# Patient Record
Sex: FEMALE | Race: WHITE | NOT HISPANIC OR LATINO | Employment: UNEMPLOYED | URBAN - METROPOLITAN AREA
[De-identification: names, ages, dates, MRNs, and addresses within clinical notes are randomized per-mention and may not be internally consistent; named-entity substitution may affect disease eponyms.]

---

## 2017-01-06 ENCOUNTER — RECORDS - HEALTHEAST (OUTPATIENT)
Dept: MAMMOGRAPHY | Facility: CLINIC | Age: 67
End: 2017-01-06

## 2017-01-06 DIAGNOSIS — Z12.31 ENCOUNTER FOR SCREENING MAMMOGRAM FOR MALIGNANT NEOPLASM OF BREAST: ICD-10-CM

## 2017-02-27 ENCOUNTER — RECORDS - HEALTHEAST (OUTPATIENT)
Dept: ADMINISTRATIVE | Facility: OTHER | Age: 67
End: 2017-02-27

## 2017-04-18 ENCOUNTER — COMMUNICATION - HEALTHEAST (OUTPATIENT)
Dept: INTERNAL MEDICINE | Facility: CLINIC | Age: 67
End: 2017-04-18

## 2017-04-20 ENCOUNTER — OFFICE VISIT - HEALTHEAST (OUTPATIENT)
Dept: INTERNAL MEDICINE | Facility: CLINIC | Age: 67
End: 2017-04-20

## 2017-04-20 DIAGNOSIS — K26.4 BLEEDING DUODENAL ULCER: ICD-10-CM

## 2017-04-20 DIAGNOSIS — L43.8 ORAL LICHEN PLANUS: ICD-10-CM

## 2017-04-20 DIAGNOSIS — K52.9 CHRONIC DIARRHEA: ICD-10-CM

## 2017-04-20 DIAGNOSIS — M06.9 RHEUMATOID ARTHRITIS (H): ICD-10-CM

## 2017-04-20 DIAGNOSIS — E78.00 HYPERCHOLESTEROLEMIA: ICD-10-CM

## 2017-04-20 ASSESSMENT — MIFFLIN-ST. JEOR: SCORE: 1331.67

## 2017-04-21 ENCOUNTER — AMBULATORY - HEALTHEAST (OUTPATIENT)
Dept: LAB | Facility: CLINIC | Age: 67
End: 2017-04-21

## 2017-04-21 DIAGNOSIS — K52.9 CHRONIC DIARRHEA: ICD-10-CM

## 2017-04-21 LAB
CHOLEST SERPL-MCNC: 256 MG/DL
FASTING STATUS PATIENT QL REPORTED: YES
HDLC SERPL-MCNC: 87 MG/DL
LDLC SERPL CALC-MCNC: 152 MG/DL
TRIGL SERPL-MCNC: 85 MG/DL

## 2017-04-25 ENCOUNTER — COMMUNICATION - HEALTHEAST (OUTPATIENT)
Dept: INTERNAL MEDICINE | Facility: CLINIC | Age: 67
End: 2017-04-25

## 2017-04-26 ENCOUNTER — COMMUNICATION - HEALTHEAST (OUTPATIENT)
Dept: INTERNAL MEDICINE | Facility: CLINIC | Age: 67
End: 2017-04-26

## 2017-04-26 ENCOUNTER — RECORDS - HEALTHEAST (OUTPATIENT)
Dept: ADMINISTRATIVE | Facility: OTHER | Age: 67
End: 2017-04-26

## 2017-04-27 ENCOUNTER — AMBULATORY - HEALTHEAST (OUTPATIENT)
Dept: INTERNAL MEDICINE | Facility: CLINIC | Age: 67
End: 2017-04-27

## 2017-04-27 ENCOUNTER — RECORDS - HEALTHEAST (OUTPATIENT)
Dept: ADMINISTRATIVE | Facility: OTHER | Age: 67
End: 2017-04-27

## 2017-04-27 ENCOUNTER — COMMUNICATION - HEALTHEAST (OUTPATIENT)
Dept: SCHEDULING | Facility: CLINIC | Age: 67
End: 2017-04-27

## 2017-06-06 ENCOUNTER — RECORDS - HEALTHEAST (OUTPATIENT)
Dept: ADMINISTRATIVE | Facility: OTHER | Age: 67
End: 2017-06-06

## 2017-07-13 ENCOUNTER — AMBULATORY - HEALTHEAST (OUTPATIENT)
Dept: INTERNAL MEDICINE | Facility: CLINIC | Age: 67
End: 2017-07-13

## 2017-07-13 ENCOUNTER — COMMUNICATION - HEALTHEAST (OUTPATIENT)
Dept: INTERNAL MEDICINE | Facility: CLINIC | Age: 67
End: 2017-07-13

## 2017-07-21 ENCOUNTER — COMMUNICATION - HEALTHEAST (OUTPATIENT)
Dept: INTERNAL MEDICINE | Facility: CLINIC | Age: 67
End: 2017-07-21

## 2017-07-24 ENCOUNTER — OFFICE VISIT - HEALTHEAST (OUTPATIENT)
Dept: INTERNAL MEDICINE | Facility: CLINIC | Age: 67
End: 2017-07-24

## 2017-07-24 DIAGNOSIS — L43.8 ORAL LICHEN PLANUS: ICD-10-CM

## 2017-07-24 DIAGNOSIS — M06.9 RHEUMATOID ARTHRITIS (H): ICD-10-CM

## 2017-07-24 RX ORDER — NYSTATIN 100000/ML
SUSPENSION, ORAL (FINAL DOSE FORM) ORAL
Status: ON HOLD | COMMUNITY
Start: 2017-07-20 | End: 2023-03-24

## 2017-07-24 RX ORDER — SULFASALAZINE 500 MG/1
1000 TABLET, DELAYED RELEASE ORAL 2 TIMES DAILY
Status: SHIPPED | COMMUNITY
Start: 2017-06-27 | End: 2023-03-30

## 2017-07-24 ASSESSMENT — MIFFLIN-ST. JEOR: SCORE: 1295.39

## 2017-07-25 ENCOUNTER — RECORDS - HEALTHEAST (OUTPATIENT)
Dept: ADMINISTRATIVE | Facility: OTHER | Age: 67
End: 2017-07-25

## 2017-08-06 ENCOUNTER — COMMUNICATION - HEALTHEAST (OUTPATIENT)
Dept: INTERNAL MEDICINE | Facility: CLINIC | Age: 67
End: 2017-08-06

## 2017-08-08 ENCOUNTER — COMMUNICATION - HEALTHEAST (OUTPATIENT)
Dept: INTERNAL MEDICINE | Facility: CLINIC | Age: 67
End: 2017-08-08

## 2017-08-10 ENCOUNTER — COMMUNICATION - HEALTHEAST (OUTPATIENT)
Dept: INTERNAL MEDICINE | Facility: CLINIC | Age: 67
End: 2017-08-10

## 2017-08-10 DIAGNOSIS — N39.0 UTI (URINARY TRACT INFECTION): ICD-10-CM

## 2017-08-11 ENCOUNTER — AMBULATORY - HEALTHEAST (OUTPATIENT)
Dept: INTERNAL MEDICINE | Facility: CLINIC | Age: 67
End: 2017-08-11

## 2017-08-24 ENCOUNTER — RECORDS - HEALTHEAST (OUTPATIENT)
Dept: ADMINISTRATIVE | Facility: OTHER | Age: 67
End: 2017-08-24

## 2017-09-05 ENCOUNTER — RECORDS - HEALTHEAST (OUTPATIENT)
Dept: ADMINISTRATIVE | Facility: OTHER | Age: 67
End: 2017-09-05

## 2017-09-19 ENCOUNTER — OFFICE VISIT - HEALTHEAST (OUTPATIENT)
Dept: INTERNAL MEDICINE | Facility: CLINIC | Age: 67
End: 2017-09-19

## 2017-09-19 DIAGNOSIS — N39.0 UTI (URINARY TRACT INFECTION): ICD-10-CM

## 2017-09-19 DIAGNOSIS — M75.82 ROTATOR CUFF TENDINITIS, LEFT: ICD-10-CM

## 2017-09-19 DIAGNOSIS — N95.2 ATROPHIC VAGINITIS: ICD-10-CM

## 2017-09-19 ASSESSMENT — MIFFLIN-ST. JEOR: SCORE: 1318.07

## 2017-09-26 ENCOUNTER — AMBULATORY - HEALTHEAST (OUTPATIENT)
Dept: INTERNAL MEDICINE | Facility: CLINIC | Age: 67
End: 2017-09-26

## 2017-09-26 ENCOUNTER — COMMUNICATION - HEALTHEAST (OUTPATIENT)
Dept: INTERNAL MEDICINE | Facility: CLINIC | Age: 67
End: 2017-09-26

## 2017-09-26 DIAGNOSIS — G89.29 CHRONIC LEFT SHOULDER PAIN: ICD-10-CM

## 2017-09-26 DIAGNOSIS — M25.512 CHRONIC LEFT SHOULDER PAIN: ICD-10-CM

## 2017-10-02 ENCOUNTER — RECORDS - HEALTHEAST (OUTPATIENT)
Dept: ADMINISTRATIVE | Facility: OTHER | Age: 67
End: 2017-10-02

## 2017-10-17 ENCOUNTER — RECORDS - HEALTHEAST (OUTPATIENT)
Dept: ADMINISTRATIVE | Facility: OTHER | Age: 67
End: 2017-10-17

## 2017-11-02 ENCOUNTER — COMMUNICATION - HEALTHEAST (OUTPATIENT)
Dept: INTERNAL MEDICINE | Facility: CLINIC | Age: 67
End: 2017-11-02

## 2017-11-03 ENCOUNTER — RECORDS - HEALTHEAST (OUTPATIENT)
Dept: ADMINISTRATIVE | Facility: OTHER | Age: 67
End: 2017-11-03

## 2017-11-03 ENCOUNTER — OFFICE VISIT - HEALTHEAST (OUTPATIENT)
Dept: INTERNAL MEDICINE | Facility: CLINIC | Age: 67
End: 2017-11-03

## 2017-11-03 ENCOUNTER — RECORDS - HEALTHEAST (OUTPATIENT)
Dept: GENERAL RADIOLOGY | Facility: CLINIC | Age: 67
End: 2017-11-03

## 2017-11-03 DIAGNOSIS — G89.29 CHRONIC LOW BACK PAIN WITHOUT SCIATICA: ICD-10-CM

## 2017-11-03 DIAGNOSIS — M54.50 CHRONIC LOW BACK PAIN WITHOUT SCIATICA: ICD-10-CM

## 2017-11-03 DIAGNOSIS — Z00.00 MEDICARE ANNUAL WELLNESS VISIT, INITIAL: ICD-10-CM

## 2017-11-03 DIAGNOSIS — M54.50 LOW BACK PAIN: ICD-10-CM

## 2017-11-03 DIAGNOSIS — L43.8 ORAL LICHEN PLANUS: ICD-10-CM

## 2017-11-03 DIAGNOSIS — Z23 NEED FOR IMMUNIZATION AGAINST INFLUENZA: ICD-10-CM

## 2017-11-03 DIAGNOSIS — R82.90 URINE ABNORMALITY: ICD-10-CM

## 2017-11-03 DIAGNOSIS — Z00.00 HEALTHCARE MAINTENANCE: ICD-10-CM

## 2017-11-03 DIAGNOSIS — M06.9 RHEUMATOID ARTHRITIS (H): ICD-10-CM

## 2017-11-03 DIAGNOSIS — E78.00 HYPERCHOLESTEROLEMIA: ICD-10-CM

## 2017-11-03 DIAGNOSIS — G89.29 OTHER CHRONIC PAIN: ICD-10-CM

## 2017-11-03 LAB
CHOLEST SERPL-MCNC: 240 MG/DL
FASTING STATUS PATIENT QL REPORTED: YES
HCV AB SERPL QL IA: NEGATIVE
HDLC SERPL-MCNC: 107 MG/DL
LDLC SERPL CALC-MCNC: 121 MG/DL
TRIGL SERPL-MCNC: 58 MG/DL

## 2017-11-03 ASSESSMENT — MIFFLIN-ST. JEOR: SCORE: 1318.07

## 2017-11-06 LAB
ANA SER QL: 0.5 U
HLA-B27 RESULT - HISTORICAL: NEGATIVE
INTERPRETATION: NORMAL

## 2017-11-20 ENCOUNTER — RECORDS - HEALTHEAST (OUTPATIENT)
Dept: ADMINISTRATIVE | Facility: OTHER | Age: 67
End: 2017-11-20

## 2017-12-04 ENCOUNTER — RECORDS - HEALTHEAST (OUTPATIENT)
Dept: ADMINISTRATIVE | Facility: OTHER | Age: 67
End: 2017-12-04

## 2018-01-29 ENCOUNTER — RECORDS - HEALTHEAST (OUTPATIENT)
Dept: ADMINISTRATIVE | Facility: OTHER | Age: 68
End: 2018-01-29

## 2018-01-30 ENCOUNTER — RECORDS - HEALTHEAST (OUTPATIENT)
Dept: MAMMOGRAPHY | Facility: CLINIC | Age: 68
End: 2018-01-30

## 2018-01-30 DIAGNOSIS — Z12.31 ENCOUNTER FOR SCREENING MAMMOGRAM FOR MALIGNANT NEOPLASM OF BREAST: ICD-10-CM

## 2018-01-31 ENCOUNTER — RECORDS - HEALTHEAST (OUTPATIENT)
Dept: ADMINISTRATIVE | Facility: OTHER | Age: 68
End: 2018-01-31

## 2018-02-02 ENCOUNTER — RECORDS - HEALTHEAST (OUTPATIENT)
Dept: ADMINISTRATIVE | Facility: OTHER | Age: 68
End: 2018-02-02

## 2018-02-22 ENCOUNTER — RECORDS - HEALTHEAST (OUTPATIENT)
Dept: ADMINISTRATIVE | Facility: OTHER | Age: 68
End: 2018-02-22

## 2018-03-13 ENCOUNTER — COMMUNICATION - HEALTHEAST (OUTPATIENT)
Dept: INTERNAL MEDICINE | Facility: CLINIC | Age: 68
End: 2018-03-13

## 2018-03-13 DIAGNOSIS — Z00.00 HEALTHCARE MAINTENANCE: ICD-10-CM

## 2018-03-13 DIAGNOSIS — M54.50 CHRONIC LOW BACK PAIN WITHOUT SCIATICA: ICD-10-CM

## 2018-03-13 DIAGNOSIS — G89.29 CHRONIC LOW BACK PAIN WITHOUT SCIATICA: ICD-10-CM

## 2018-03-13 DIAGNOSIS — E78.00 HYPERCHOLESTEROLEMIA: ICD-10-CM

## 2018-03-13 DIAGNOSIS — Z00.00 MEDICARE ANNUAL WELLNESS VISIT, INITIAL: ICD-10-CM

## 2018-03-13 DIAGNOSIS — M06.9 RHEUMATOID ARTHRITIS (H): ICD-10-CM

## 2018-03-13 DIAGNOSIS — L43.8 ORAL LICHEN PLANUS: ICD-10-CM

## 2018-03-13 DIAGNOSIS — R82.90 URINE ABNORMALITY: ICD-10-CM

## 2018-03-13 DIAGNOSIS — Z23 NEED FOR IMMUNIZATION AGAINST INFLUENZA: ICD-10-CM

## 2018-03-14 ENCOUNTER — COMMUNICATION - HEALTHEAST (OUTPATIENT)
Dept: INTERNAL MEDICINE | Facility: CLINIC | Age: 68
End: 2018-03-14

## 2018-03-14 DIAGNOSIS — Z00.00 MEDICARE ANNUAL WELLNESS VISIT, INITIAL: ICD-10-CM

## 2018-03-14 DIAGNOSIS — M06.9 RHEUMATOID ARTHRITIS (H): ICD-10-CM

## 2018-03-14 DIAGNOSIS — E78.00 HYPERCHOLESTEROLEMIA: ICD-10-CM

## 2018-03-14 DIAGNOSIS — Z23 NEED FOR IMMUNIZATION AGAINST INFLUENZA: ICD-10-CM

## 2018-03-14 DIAGNOSIS — M54.50 CHRONIC LOW BACK PAIN WITHOUT SCIATICA: ICD-10-CM

## 2018-03-14 DIAGNOSIS — G89.29 CHRONIC LOW BACK PAIN WITHOUT SCIATICA: ICD-10-CM

## 2018-03-14 DIAGNOSIS — R82.90 URINE ABNORMALITY: ICD-10-CM

## 2018-03-14 DIAGNOSIS — L43.8 ORAL LICHEN PLANUS: ICD-10-CM

## 2018-03-14 DIAGNOSIS — Z00.00 HEALTHCARE MAINTENANCE: ICD-10-CM

## 2018-04-02 ENCOUNTER — RECORDS - HEALTHEAST (OUTPATIENT)
Dept: ADMINISTRATIVE | Facility: OTHER | Age: 68
End: 2018-04-02

## 2018-04-03 ENCOUNTER — OFFICE VISIT - HEALTHEAST (OUTPATIENT)
Dept: INTERNAL MEDICINE | Facility: CLINIC | Age: 68
End: 2018-04-03

## 2018-04-03 DIAGNOSIS — Z01.818 ENCOUNTER FOR PREOPERATIVE EXAMINATION FOR GENERAL SURGICAL PROCEDURE: ICD-10-CM

## 2018-04-03 DIAGNOSIS — M06.9 RHEUMATOID ARTHRITIS (H): ICD-10-CM

## 2018-04-03 DIAGNOSIS — M75.122 COMPLETE TEAR OF LEFT ROTATOR CUFF: ICD-10-CM

## 2018-04-03 LAB
ERYTHROCYTE [DISTWIDTH] IN BLOOD BY AUTOMATED COUNT: 12.1 % (ref 11–14.5)
HCT VFR BLD AUTO: 41.8 % (ref 35–47)
HGB BLD-MCNC: 14 G/DL (ref 12–16)
MCH RBC QN AUTO: 31.5 PG (ref 27–34)
MCHC RBC AUTO-ENTMCNC: 33.5 G/DL (ref 32–36)
MCV RBC AUTO: 94 FL (ref 80–100)
PLATELET # BLD AUTO: 209 THOU/UL (ref 140–440)
PMV BLD AUTO: 7.7 FL (ref 7–10)
RBC # BLD AUTO: 4.45 MILL/UL (ref 3.8–5.4)
WBC: 4.6 THOU/UL (ref 4–11)

## 2018-04-03 ASSESSMENT — MIFFLIN-ST. JEOR: SCORE: 1345.28

## 2018-04-09 ENCOUNTER — RECORDS - HEALTHEAST (OUTPATIENT)
Dept: ADMINISTRATIVE | Facility: OTHER | Age: 68
End: 2018-04-09

## 2018-05-10 ENCOUNTER — RECORDS - HEALTHEAST (OUTPATIENT)
Dept: ADMINISTRATIVE | Facility: OTHER | Age: 68
End: 2018-05-10

## 2018-05-22 ENCOUNTER — RECORDS - HEALTHEAST (OUTPATIENT)
Dept: ADMINISTRATIVE | Facility: OTHER | Age: 68
End: 2018-05-22

## 2018-05-24 ENCOUNTER — RECORDS - HEALTHEAST (OUTPATIENT)
Dept: ADMINISTRATIVE | Facility: OTHER | Age: 68
End: 2018-05-24

## 2018-05-30 ENCOUNTER — RECORDS - HEALTHEAST (OUTPATIENT)
Dept: ADMINISTRATIVE | Facility: OTHER | Age: 68
End: 2018-05-30

## 2018-06-12 ENCOUNTER — COMMUNICATION - HEALTHEAST (OUTPATIENT)
Dept: INTERNAL MEDICINE | Facility: CLINIC | Age: 68
End: 2018-06-12

## 2018-08-01 ENCOUNTER — OFFICE VISIT - HEALTHEAST (OUTPATIENT)
Dept: INTERNAL MEDICINE | Facility: CLINIC | Age: 68
End: 2018-08-01

## 2018-08-01 DIAGNOSIS — N39.0 URINARY TRACT INFECTION: ICD-10-CM

## 2018-08-01 LAB
ALBUMIN UR-MCNC: NEGATIVE MG/DL
APPEARANCE UR: CLEAR
BACTERIA #/AREA URNS HPF: ABNORMAL HPF
BILIRUB UR QL STRIP: NEGATIVE
COLOR UR AUTO: YELLOW
GLUCOSE UR STRIP-MCNC: NEGATIVE MG/DL
HGB UR QL STRIP: NEGATIVE
KETONES UR STRIP-MCNC: NEGATIVE MG/DL
LEUKOCYTE ESTERASE UR QL STRIP: ABNORMAL
NITRATE UR QL: NEGATIVE
PH UR STRIP: 5.5 [PH] (ref 5–8)
RBC #/AREA URNS AUTO: ABNORMAL HPF
SP GR UR STRIP: 1.02 (ref 1–1.03)
SQUAMOUS #/AREA URNS AUTO: ABNORMAL LPF
UROBILINOGEN UR STRIP-ACNC: ABNORMAL
WBC #/AREA URNS AUTO: ABNORMAL HPF

## 2018-08-01 ASSESSMENT — MIFFLIN-ST. JEOR: SCORE: 1335.99

## 2018-08-03 LAB — BACTERIA SPEC CULT: ABNORMAL

## 2018-08-06 ENCOUNTER — RECORDS - HEALTHEAST (OUTPATIENT)
Dept: ADMINISTRATIVE | Facility: OTHER | Age: 68
End: 2018-08-06

## 2018-09-06 ENCOUNTER — RECORDS - HEALTHEAST (OUTPATIENT)
Dept: ADMINISTRATIVE | Facility: OTHER | Age: 68
End: 2018-09-06

## 2018-09-12 ENCOUNTER — COMMUNICATION - HEALTHEAST (OUTPATIENT)
Dept: INTERNAL MEDICINE | Facility: CLINIC | Age: 68
End: 2018-09-12

## 2018-09-12 DIAGNOSIS — E78.00 HYPERCHOLESTEROLEMIA: ICD-10-CM

## 2018-09-13 ENCOUNTER — RECORDS - HEALTHEAST (OUTPATIENT)
Dept: ADMINISTRATIVE | Facility: OTHER | Age: 68
End: 2018-09-13

## 2018-10-15 ENCOUNTER — OFFICE VISIT - HEALTHEAST (OUTPATIENT)
Dept: INTERNAL MEDICINE | Facility: CLINIC | Age: 68
End: 2018-10-15

## 2018-10-15 DIAGNOSIS — J06.9 UPPER RESPIRATORY INFECTION: ICD-10-CM

## 2018-10-17 ENCOUNTER — COMMUNICATION - HEALTHEAST (OUTPATIENT)
Dept: INTERNAL MEDICINE | Facility: CLINIC | Age: 68
End: 2018-10-17

## 2018-10-30 ENCOUNTER — RECORDS - HEALTHEAST (OUTPATIENT)
Dept: ADMINISTRATIVE | Facility: OTHER | Age: 68
End: 2018-10-30

## 2018-11-20 ENCOUNTER — OFFICE VISIT - HEALTHEAST (OUTPATIENT)
Dept: INTERNAL MEDICINE | Facility: CLINIC | Age: 68
End: 2018-11-20

## 2018-11-20 DIAGNOSIS — Z13.1 ENCOUNTER FOR SCREENING FOR DIABETES MELLITUS: ICD-10-CM

## 2018-11-20 DIAGNOSIS — M06.9 RHEUMATOID ARTHRITIS INVOLVING MULTIPLE SITES, UNSPECIFIED RHEUMATOID FACTOR PRESENCE: ICD-10-CM

## 2018-11-20 DIAGNOSIS — M54.50 CHRONIC BILATERAL LOW BACK PAIN WITHOUT SCIATICA: ICD-10-CM

## 2018-11-20 DIAGNOSIS — I73.00 RAYNAUD'S DISEASE WITHOUT GANGRENE: ICD-10-CM

## 2018-11-20 DIAGNOSIS — Z00.00 MEDICARE ANNUAL WELLNESS VISIT, SUBSEQUENT: ICD-10-CM

## 2018-11-20 DIAGNOSIS — L43.8 ORAL LICHEN PLANUS: ICD-10-CM

## 2018-11-20 DIAGNOSIS — M75.122 COMPLETE TEAR OF LEFT ROTATOR CUFF: ICD-10-CM

## 2018-11-20 DIAGNOSIS — E78.00 HYPERCHOLESTEROLEMIA: ICD-10-CM

## 2018-11-20 DIAGNOSIS — G89.29 CHRONIC BILATERAL LOW BACK PAIN WITHOUT SCIATICA: ICD-10-CM

## 2018-11-20 LAB
CHOLEST SERPL-MCNC: 255 MG/DL
FASTING STATUS PATIENT QL REPORTED: ABNORMAL
FASTING STATUS PATIENT QL REPORTED: NORMAL
GLUCOSE BLD-MCNC: 90 MG/DL (ref 70–125)
HDLC SERPL-MCNC: 101 MG/DL
LDLC SERPL CALC-MCNC: 138 MG/DL
TRIGL SERPL-MCNC: 78 MG/DL

## 2018-11-20 RX ORDER — CELECOXIB 200 MG/1
200 CAPSULE ORAL 2 TIMES DAILY
Qty: 180 CAPSULE | Refills: 3 | Status: SHIPPED | OUTPATIENT
Start: 2018-11-20 | End: 2022-04-27

## 2018-11-20 ASSESSMENT — MIFFLIN-ST. JEOR: SCORE: 1328.28

## 2018-12-10 ENCOUNTER — RECORDS - HEALTHEAST (OUTPATIENT)
Dept: ADMINISTRATIVE | Facility: OTHER | Age: 68
End: 2018-12-10

## 2018-12-16 ENCOUNTER — SURGERY - HEALTHEAST (OUTPATIENT)
Dept: GASTROENTEROLOGY | Facility: CLINIC | Age: 68
End: 2018-12-16

## 2019-01-07 ENCOUNTER — COMMUNICATION - HEALTHEAST (OUTPATIENT)
Dept: INTERNAL MEDICINE | Facility: CLINIC | Age: 69
End: 2019-01-07

## 2019-01-07 DIAGNOSIS — M06.9 RHEUMATOID ARTHRITIS (H): ICD-10-CM

## 2019-01-07 DIAGNOSIS — Z00.00 HEALTHCARE MAINTENANCE: ICD-10-CM

## 2019-01-07 DIAGNOSIS — Z00.00 MEDICARE ANNUAL WELLNESS VISIT, INITIAL: ICD-10-CM

## 2019-01-07 DIAGNOSIS — L43.8 ORAL LICHEN PLANUS: ICD-10-CM

## 2019-01-07 DIAGNOSIS — R82.90 URINE ABNORMALITY: ICD-10-CM

## 2019-01-07 DIAGNOSIS — M54.50 CHRONIC LOW BACK PAIN WITHOUT SCIATICA: ICD-10-CM

## 2019-01-07 DIAGNOSIS — E78.00 HYPERCHOLESTEROLEMIA: ICD-10-CM

## 2019-01-07 DIAGNOSIS — Z23 NEED FOR IMMUNIZATION AGAINST INFLUENZA: ICD-10-CM

## 2019-01-07 DIAGNOSIS — G89.29 CHRONIC LOW BACK PAIN WITHOUT SCIATICA: ICD-10-CM

## 2019-03-07 ENCOUNTER — RECORDS - HEALTHEAST (OUTPATIENT)
Dept: MAMMOGRAPHY | Facility: CLINIC | Age: 69
End: 2019-03-07

## 2019-03-07 DIAGNOSIS — Z12.31 ENCOUNTER FOR SCREENING MAMMOGRAM FOR MALIGNANT NEOPLASM OF BREAST: ICD-10-CM

## 2019-03-12 ENCOUNTER — RECORDS - HEALTHEAST (OUTPATIENT)
Dept: ADMINISTRATIVE | Facility: OTHER | Age: 69
End: 2019-03-12

## 2019-03-13 ENCOUNTER — RECORDS - HEALTHEAST (OUTPATIENT)
Dept: ADMINISTRATIVE | Facility: OTHER | Age: 69
End: 2019-03-13

## 2019-05-06 ENCOUNTER — RECORDS - HEALTHEAST (OUTPATIENT)
Dept: ADMINISTRATIVE | Facility: OTHER | Age: 69
End: 2019-05-06

## 2019-05-20 ENCOUNTER — RECORDS - HEALTHEAST (OUTPATIENT)
Dept: ADMINISTRATIVE | Facility: OTHER | Age: 69
End: 2019-05-20

## 2019-07-09 ENCOUNTER — RECORDS - HEALTHEAST (OUTPATIENT)
Dept: ADMINISTRATIVE | Facility: OTHER | Age: 69
End: 2019-07-09

## 2019-10-10 ENCOUNTER — AMBULATORY - HEALTHEAST (OUTPATIENT)
Dept: NURSING | Facility: CLINIC | Age: 69
End: 2019-10-10

## 2019-10-10 ENCOUNTER — COMMUNICATION - HEALTHEAST (OUTPATIENT)
Dept: INTERNAL MEDICINE | Facility: CLINIC | Age: 69
End: 2019-10-10

## 2019-10-10 DIAGNOSIS — Z23 FLU VACCINE NEED: ICD-10-CM

## 2019-10-10 DIAGNOSIS — E78.00 HYPERCHOLESTEROLEMIA: ICD-10-CM

## 2019-10-23 ENCOUNTER — RECORDS - HEALTHEAST (OUTPATIENT)
Dept: ADMINISTRATIVE | Facility: OTHER | Age: 69
End: 2019-10-23

## 2019-11-28 ENCOUNTER — COMMUNICATION - HEALTHEAST (OUTPATIENT)
Dept: SCHEDULING | Facility: CLINIC | Age: 69
End: 2019-11-28

## 2019-12-16 ENCOUNTER — RECORDS - HEALTHEAST (OUTPATIENT)
Dept: ADMINISTRATIVE | Facility: OTHER | Age: 69
End: 2019-12-16

## 2020-01-29 ENCOUNTER — COMMUNICATION - HEALTHEAST (OUTPATIENT)
Dept: INTERNAL MEDICINE | Facility: CLINIC | Age: 70
End: 2020-01-29

## 2020-01-30 ENCOUNTER — COMMUNICATION - HEALTHEAST (OUTPATIENT)
Dept: INTERNAL MEDICINE | Facility: CLINIC | Age: 70
End: 2020-01-30

## 2020-01-30 DIAGNOSIS — E78.00 HYPERCHOLESTEROLEMIA: ICD-10-CM

## 2020-01-30 DIAGNOSIS — N95.2 ATROPHIC VAGINITIS: ICD-10-CM

## 2020-02-18 ENCOUNTER — RECORDS - HEALTHEAST (OUTPATIENT)
Dept: ADMINISTRATIVE | Facility: OTHER | Age: 70
End: 2020-02-18

## 2020-02-19 ENCOUNTER — RECORDS - HEALTHEAST (OUTPATIENT)
Dept: ADMINISTRATIVE | Facility: OTHER | Age: 70
End: 2020-02-19

## 2020-06-16 ENCOUNTER — OFFICE VISIT - HEALTHEAST (OUTPATIENT)
Dept: INTERNAL MEDICINE | Facility: CLINIC | Age: 70
End: 2020-06-16

## 2020-06-16 DIAGNOSIS — K21.9 GASTROESOPHAGEAL REFLUX DISEASE WITHOUT ESOPHAGITIS: ICD-10-CM

## 2020-06-16 DIAGNOSIS — N90.4 LICHEN SCLEROSUS OF FEMALE GENITALIA: ICD-10-CM

## 2020-06-16 DIAGNOSIS — E78.00 HYPERCHOLESTEROLEMIA: ICD-10-CM

## 2020-06-16 DIAGNOSIS — M06.9 RHEUMATOID ARTHRITIS INVOLVING MULTIPLE SITES, UNSPECIFIED RHEUMATOID FACTOR PRESENCE: ICD-10-CM

## 2020-06-16 DIAGNOSIS — G89.29 CHRONIC BILATERAL LOW BACK PAIN WITHOUT SCIATICA: ICD-10-CM

## 2020-06-16 DIAGNOSIS — M54.50 CHRONIC BILATERAL LOW BACK PAIN WITHOUT SCIATICA: ICD-10-CM

## 2020-06-16 DIAGNOSIS — Z00.00 MEDICARE ANNUAL WELLNESS VISIT, SUBSEQUENT: ICD-10-CM

## 2020-06-20 ENCOUNTER — COMMUNICATION - HEALTHEAST (OUTPATIENT)
Dept: INTERNAL MEDICINE | Facility: CLINIC | Age: 70
End: 2020-06-20

## 2020-06-20 DIAGNOSIS — E78.00 HYPERCHOLESTEROLEMIA: ICD-10-CM

## 2020-06-20 RX ORDER — ATORVASTATIN CALCIUM 20 MG/1
20 TABLET, FILM COATED ORAL AT BEDTIME
Qty: 90 TABLET | Refills: 3 | Status: SHIPPED | OUTPATIENT
Start: 2020-06-20 | End: 2021-08-08

## 2020-06-22 ENCOUNTER — COMMUNICATION - HEALTHEAST (OUTPATIENT)
Dept: INTERNAL MEDICINE | Facility: CLINIC | Age: 70
End: 2020-06-22

## 2020-06-24 ENCOUNTER — RECORDS - HEALTHEAST (OUTPATIENT)
Dept: ADMINISTRATIVE | Facility: OTHER | Age: 70
End: 2020-06-24

## 2020-06-24 LAB
ALT SERPL W/O P-5'-P-CCNC: 22 IU/L (ref 5–35)
AST SERPL-CCNC: 37 U/L (ref 5–34)

## 2020-07-09 ENCOUNTER — RECORDS - HEALTHEAST (OUTPATIENT)
Dept: HEALTH INFORMATION MANAGEMENT | Facility: CLINIC | Age: 70
End: 2020-07-09

## 2020-08-10 ENCOUNTER — RECORDS - HEALTHEAST (OUTPATIENT)
Dept: ADMINISTRATIVE | Facility: OTHER | Age: 70
End: 2020-08-10

## 2020-08-18 ENCOUNTER — HOSPITAL ENCOUNTER (OUTPATIENT)
Dept: MAMMOGRAPHY | Facility: CLINIC | Age: 70
Discharge: HOME | End: 2020-08-18
Attending: INTERNAL MEDICINE

## 2020-08-18 DIAGNOSIS — Z12.31 VISIT FOR SCREENING MAMMOGRAM: ICD-10-CM

## 2020-10-26 ENCOUNTER — RECORDS - HEALTHEAST (OUTPATIENT)
Dept: ADMINISTRATIVE | Facility: OTHER | Age: 70
End: 2020-10-26

## 2020-11-05 ENCOUNTER — RECORDS - HEALTHEAST (OUTPATIENT)
Dept: ADMINISTRATIVE | Facility: OTHER | Age: 70
End: 2020-11-05

## 2021-02-08 ENCOUNTER — RECORDS - HEALTHEAST (OUTPATIENT)
Dept: ADMINISTRATIVE | Facility: OTHER | Age: 71
End: 2021-02-08

## 2021-05-28 ENCOUNTER — RECORDS - HEALTHEAST (OUTPATIENT)
Dept: ADMINISTRATIVE | Facility: CLINIC | Age: 71
End: 2021-05-28

## 2021-05-29 ENCOUNTER — RECORDS - HEALTHEAST (OUTPATIENT)
Dept: ADMINISTRATIVE | Facility: CLINIC | Age: 71
End: 2021-05-29

## 2021-05-30 ENCOUNTER — RECORDS - HEALTHEAST (OUTPATIENT)
Dept: ADMINISTRATIVE | Facility: CLINIC | Age: 71
End: 2021-05-30

## 2021-05-30 VITALS — WEIGHT: 170 LBS | BODY MASS INDEX: 25.76 KG/M2 | HEIGHT: 68 IN

## 2021-05-31 ENCOUNTER — RECORDS - HEALTHEAST (OUTPATIENT)
Dept: ADMINISTRATIVE | Facility: CLINIC | Age: 71
End: 2021-05-31

## 2021-05-31 VITALS — WEIGHT: 162 LBS | BODY MASS INDEX: 24.55 KG/M2 | HEIGHT: 68 IN

## 2021-05-31 VITALS — WEIGHT: 167 LBS | BODY MASS INDEX: 25.31 KG/M2 | HEIGHT: 68 IN

## 2021-05-31 VITALS — HEIGHT: 68 IN | WEIGHT: 167 LBS | BODY MASS INDEX: 25.31 KG/M2

## 2021-06-01 ENCOUNTER — RECORDS - HEALTHEAST (OUTPATIENT)
Dept: ADMINISTRATIVE | Facility: CLINIC | Age: 71
End: 2021-06-01

## 2021-06-01 VITALS — HEIGHT: 67 IN | WEIGHT: 172 LBS | BODY MASS INDEX: 27 KG/M2

## 2021-06-01 VITALS — HEIGHT: 68 IN | BODY MASS INDEX: 26.22 KG/M2 | WEIGHT: 173 LBS

## 2021-06-02 ENCOUNTER — RECORDS - HEALTHEAST (OUTPATIENT)
Dept: ADMINISTRATIVE | Facility: CLINIC | Age: 71
End: 2021-06-02

## 2021-06-02 VITALS — WEIGHT: 170.5 LBS | BODY MASS INDEX: 26.55 KG/M2

## 2021-06-02 VITALS — HEIGHT: 67 IN | BODY MASS INDEX: 26.84 KG/M2 | WEIGHT: 171 LBS

## 2021-06-02 NOTE — TELEPHONE ENCOUNTER
Refill Approved    Rx renewed per Medication Renewal Policy. Medication was last renewed on 9/12/18.    Yesi Oh, [facility] Triage/Med Refill 10/11/2019     Requested Prescriptions   Pending Prescriptions Disp Refills     atorvastatin (LIPITOR) 20 MG tablet 90 tablet 3     Sig: Take 1 tablet (20 mg total) by mouth daily.       Statins Refill Protocol (Hmg CoA Reductase Inhibitors) Passed - 10/10/2019  3:32 PM        Passed - PCP or prescribing provider visit in past 12 months      Last office visit with prescriber/PCP: 7/24/2017 Rishi Chew MD OR same dept: 10/15/2018 Jason Peraza MD OR same specialty: 10/15/2018 Jason Peraza MD  Last physical: 11/20/2018 Last MTM visit: Visit date not found   Next visit within 3 mo: Visit date not found  Next physical within 3 mo: Visit date not found  Prescriber OR PCP: Rishi Chew MD  Last diagnosis associated with med order: 1. Hypercholesterolemia  - atorvastatin (LIPITOR) 20 MG tablet; Take 1 tablet (20 mg total) by mouth daily.  Dispense: 90 tablet; Refill: 3    If protocol passes may refill for 12 months if within 3 months of last provider visit (or a total of 15 months).

## 2021-06-02 NOTE — TELEPHONE ENCOUNTER
Refill Request  Did you contact pharmacy: Yes  Medication name:   Requested Prescriptions     Pending Prescriptions Disp Refills     atorvastatin (LIPITOR) 20 MG tablet 90 tablet 3     Sig: Take 1 tablet (20 mg total) by mouth daily.     Who prescribed the medication:   EDINSON RIOS  Pharmacy Name and Location:   [facility]  [address] [place] [address]  Phone:  [phone]  Fax:  [phone]  Is patient out of medication: No.  7 days left  Patient notified refills processed in 72 hours:  yes  Okay to leave a detailed message: yes

## 2021-06-03 NOTE — TELEPHONE ENCOUNTER
Triage call:   Patient calling from [place]- advised that since patient is outside of [place] and [place] that RN could not triage symptoms.     Patient states that she has a bladder infection and is requesting an antibiotic over the phone. Advised that patient needs to present to a WIC or UC near her in [place] to be seen and provide a sample as UTIs are not treated over the phone with out a urine sample.     Patient verbalizes understanding and will go to be seen.     Maritza Tripp RN BSBA [facility] Triage/Med Refill 11/28/2019 11:13 AM     Reason for Disposition    General information question, no triage required and triager able to answer question     Patient is out of state- needs to be seen in [place]    Protocols used: INFORMATION ONLY CALL-A-

## 2021-06-04 VITALS — SYSTOLIC BLOOD PRESSURE: 128 MMHG | DIASTOLIC BLOOD PRESSURE: 78 MMHG | WEIGHT: 168 LBS | BODY MASS INDEX: 26.31 KG/M2

## 2021-06-05 NOTE — TELEPHONE ENCOUNTER
Medication Question or Clarification  Who is calling: fax  What medication are you calling about (include dose and sig)?: premarin Vag CRM W/AP 30 gm.   Who prescribed the medication?: not an active medicaotn  What is your question/concern?: Patient has new pharmacy and they are asking for this order above.  Please advise.   Requested Pharmacy: [facility]  Okay to leave a detailed message?: Yes

## 2021-06-05 NOTE — TELEPHONE ENCOUNTER
RN cannot approve Refill Request    RN can NOT refill this medication PCP messaged that patient is overdue for Office Visit. Last office visit: 7/24/2017 Rishi Chew MD Last Physical: 11/20/2018 Last MTM visit: Visit date not found Last visit same specialty: 10/15/2018 Jason Peraza MD.  Next visit within 3 mo: Visit date not found  Next physical within 3 mo: Visit date not found      Dahlia French, [facility] Connection Triage/Med Refill 1/31/2020    Requested Prescriptions   Pending Prescriptions Disp Refills     atorvastatin (LIPITOR) 20 MG tablet 90 tablet 0     Sig: Take 1 tablet (20 mg total) by mouth daily.       Statins Refill Protocol (Hmg CoA Reductase Inhibitors) Failed - 1/30/2020  2:28 PM        Failed - PCP or prescribing provider visit in past 12 months      Last office visit with prescriber/PCP: 7/24/2017 Rishi Chew MD OR same dept: Visit date not found OR same specialty: 10/15/2018 Jason Peraza MD  Last physical: 11/20/2018 Last MTM visit: Visit date not found   Next visit within 3 mo: Visit date not found  Next physical within 3 mo: Visit date not found  Prescriber OR PCP: Rishi Chew MD  Last diagnosis associated with med order: 1. Hypercholesterolemia  - atorvastatin (LIPITOR) 20 MG tablet; Take 1 tablet (20 mg total) by mouth daily.  Dispense: 90 tablet; Refill: 0    If protocol passes may refill for 12 months if within 3 months of last provider visit (or a total of 15 months).

## 2021-06-05 NOTE — TELEPHONE ENCOUNTER
Please call Erika and help her to schedule an annual physical for this spring.  I will need to see her for further refills beyond the 1 just sent.

## 2021-06-05 NOTE — TELEPHONE ENCOUNTER
Pharmacy has been updated per message below from the patient.  Sandee SEVILLA CMA/ELIZABETH....................12:45 PM

## 2021-06-05 NOTE — TELEPHONE ENCOUNTER
FYI - Status Update  Who is Calling: Patient  Update: Patient stated that she changed mail order pharmacy and she will now be using [facility] mail order.   Okay to leave a detailed message?:  No return call needed

## 2021-06-08 NOTE — PROGRESS NOTES
Assessment and Plan:       1. Medicare annual wellness visit, subsequent  Immunizations are reviewed and she is due to get her tetanus updated.  She can obtain this at her pharmacy.  She has a living will.  Non-smoker.  Uses alcohol in moderation.  Exercising on a regular basis.  Up to date with colonoscopies and this should be repeated in 2027.  Continue annual mammograms.  This should be scheduled for the summer.  Continue to follow-up with OB/GYN annually with lichen sclerosis.  Last DEXA was 2016 and was normal and this should be repeated in 2021. Dementia and depression screening completed.  She sees an ophthalmologist every 6 months while on Plaquenil and gets glaucoma screening.  She will follow-up with dermatology every year and recommending regular use of sunblock.  Hepatitis C antibody for screening was normal.  Will screen for diabetes with fasting glucose.       2. Rheumatoid arthritis involving multiple sites, unspecified rheumatoid factor presence (H)  Followed by Dr. Felipe, rheumatology and stable with combination of Simponi which is infused every 2 months and hydroxychloroquine and sulfasalazine.  Reviewed recent labs with normal renal and liver function and normal CBC.  She sees an eye doctor every 6 months while on hydroxychloroquine.    3. Hypercholesterolemia  Continues on atorvastatin.  Will get fasting lipid profile checked.  No longer on aspirin with history of duodenal ulcer.    4. Chronic bilateral low back pain without sciatica  Continues to use Celebrex twice daily to manage chronic pain involving her back as well as her arthritis symptoms    5. Gastroesophageal reflux disease without esophagitis  No longer taking pantoprazole and reflux remains controlled    6.  Lichen [id number] with OB/GYN    The patient's current medical problems were reviewed.    I have had an Advance Directives discussion with the patient.  The following health maintenance schedule was reviewed with the  patient and provided in printed form in the after visit summary:   Health Maintenance   Topic Date Due     DXA SCAN  07/27/2018     TD 18+ HE  03/22/2020     MAMMOGRAM  03/07/2021     MEDICARE ANNUAL WELLNESS VISIT  06/16/2021     FALL RISK ASSESSMENT  06/16/2021     LIPID  11/20/2023     ADVANCE CARE PLANNING  06/16/2025     COLORECTAL CANCER SCREENING  04/27/2027     HEPATITIS C SCREENING  Completed     PNEUMOCOCCAL IMMUNIZATION 65+ LOW/MEDIUM RISK  Completed     INFLUENZA VACCINE RULE BASED  Completed     ZOSTER VACCINES  Completed        Subjective:   Chief Complaint: Antonia Everett is an 70 y.o. female here for an Annual Wellness visit.   HPI: In addition to annual wellness visit, chronic medical problems discussed during today's video visit.    She has rheumatoid arthritis and is followed by rheumatology.  She gets infusions every 2 months with Simponi and continues on hydroxychloroquine and sulfasalazine.  Arthritis symptoms are generally well controlled although she is having more pain in her hands.  Her labs are checked every 3 months and liver and renal function along with CBC have been stable.    Ongoing chronic low back pain secondary to degenerative disc disease.  No radicular pain.  She uses Celebrex 200 mg twice daily to control symptoms.  Continues with home exercises as well.    Using atorvastatin to manage cholesterol.  Tolerating without side effects.  No longer taking aspirin with history of duodenal ulcer.    Lichen sclerosus is now involving genitalia.  She is seeing her OB/GYN every year.    No longer taking proton pump inhibitor.  Reflux is controlled.    Review of Systems:    Please see above.  The rest of the review of systems are negative for all systems.    Patient Care Team:  Rishi Chew MD as PCP - General (Internal Medicine)  Mireya Howard MD (Dermatology)  Gera Lopez MD as Physician (Ophthalmology)  Mireya Felipe MD as Physician (Rheumatology)  Jeevan  Rishi BARNES MD as Assigned PCP     Patient Active Problem List   Diagnosis     Lupus Anticoagulant     Esophageal Reflux     Lumbar Disc Degeneration     Rheumatoid arthritis (H)     Hypercholesterolemia     Family history of breast cancer     Lumbar radiculopathy     Chronic pain     Bleeding duodenal ulcer     Oral lichen planus     Chronic low back pain without sciatica     Complete tear of left rotator cuff     Raynaud's disease without gangrene     Past Medical History:   Diagnosis Date     Bleeding duodenal ulcer 2010     Chronic diarrhea 04/20/2017    Colonoscopy normal except inflammatory polyp or     Chronic left shoulder pain 9/26/2017     Chronic low back pain without sciatica 11/3/2017     Chronic pain      Chronic radicular low back pain     Pain clinic.  Nerve stimulator placement     Complete tear of left rotator cuff 4/3/2018     Duodenal ulcer     bleeding     Family history of breast cancer      GERD (gastroesophageal reflux disease)      H/O ulcer disease     secondary to medications     Herpes zoster 2010     History of transfusion      Hypercholesterolemia      LA (lupus anticoagulant) disorder (H)      Left hip pain 2015    bursitis, evaluated by orthopedics     MRSA (methicillin resistant staph aureus) culture positive     betweeen 1996 and 2000     Oral lichen planus 4/20/2017     Plantar fasciitis      Raynaud's disease without gangrene 11/20/2018    Involving both feet, describing burning sensation, redness and coolness to touch     Rheumatoid arthritis (H)     Followed by rheumatology, Dr. Felipe,      Screening     DEXA normal T score -0.9 2016, minimal change compared to previous DEXA     VRE (vancomycin resistant enterococcus) culture positive     between 1996 and 2000      Past Surgical History:   Procedure Laterality Date     ABDOMINAL HERNIA REPAIR       APPENDECTOMY       BACK SURGERY       BREAST BIOPSY Left      COLONOSCOPY      Normal colonoscopy May 2017 except inflammatory  polyp     ELBOW FRACTURE SURGERY  2014     ESOPHAGOGASTRODUODENOSCOPY N/A 2018    Procedure: ESOPHAGOGASTRODUODENOSCOPY (EGD) with biopsy and foreign body removal;  Surgeon: Raymond Triana MD;  Location: [facility];  Service: Gastroenterology     HYSTERECTOMY      LUIS ANGEL with BSO     KNEE SURGERY Right 2007    Arthroscopic knee surgery     IL ARTHRODESIS ANT INTERBODY MIN DISCECTOMY,LUMBAR      Description: Lumbar Vertebral Fusion;  Recorded: 2011;  Comments: L5-S1 fusion     IL REMOVAL OF TONSILS,<11 Y/O      Description: Tonsillectomy;  Recorded: 2011;     IL STEREOTACT STIM SPINAL CORD      Description: Spinal Stereotaxis Stimulation Of Cord;  Recorded: 2011;  Comments: implanted 2007 with lead revision      ROTATOR CUFF REPAIR Right      ROTATOR CUFF REPAIR Left 2018     SKIN GRAFT       TOTAL ABDOMINAL HYSTERECTOMY W/ BILATERAL SALPINGOOPHORECTOMY        Family History   Problem Relation Age of Onset     Breast cancer Mother 74     Stroke Mother      Lung cancer Father 70     Breast cancer Maternal Aunt 70     Liver cancer Brother 48      Social History     Socioeconomic History     Marital status:      Spouse name: Not on file     Number of children: Not on file     Years of education: Not on file     Highest education level: Not on file   Occupational History     Not on file   Social Needs     Financial resource strain: Not on file     Food insecurity     Worry: Not on file     Inability: Not on file     Transportation needs     Medical: Not on file     Non-medical: Not on file   Tobacco Use     Smoking status: Former Smoker     Last attempt to quit: 1970     Years since quittin.1     Smokeless tobacco: Never Used   Substance and Sexual Activity     Alcohol use: No     Drug use: No     Sexual activity: Not on file   Lifestyle     Physical activity     Days per week: Not on file     Minutes per session: Not on file     Stress: Not on file    Relationships     Social connections     Talks on phone: Not on file     Gets together: Not on file     Attends [employer] service: Not on file     Active member of club or organization: Not on file     Attends meetings of clubs or organizations: Not on file     Relationship status: Not on file     Intimate partner violence     Fear of current or ex partner: Not on file     Emotionally abused: Not on file     Physically abused: Not on file     Forced sexual activity: Not on file   Other Topics Concern     Not on file   Social History Narrative     Not on file      Current Outpatient Medications   Medication Sig Dispense Refill     atorvastatin (LIPITOR) 20 MG tablet Take 1 tablet (20 mg total) by mouth daily. Appointment needed for further refills 90 tablet 0     celecoxib (CELEBREX) 200 MG capsule Take 1 capsule (200 mg total) by mouth 2 (two) times a day. 180 capsule 3     conjugated estrogens (PREMARIN) vaginal cream Insert 0.5 g into the vagina daily. 42.5 g 1     diclofenac sodium (VOLTAREN) 1 % Gel Apply 4 g topically. To affected joint 3 - 4 ties daily as needed.       GOLIMUMAB (SIMPONI ARIA IV) Infuse into a venous catheter every 2 (two) months.        hydroxychloroquine (PLAQUENIL) 200 mg tablet Take 400 mg by mouth daily.        Lactobacillus rhamnosus GG (CULTURELLE) 10-15 Billion cell capsule Take 1 capsule by mouth daily.       multivitamin therapeutic (THERAGRAN) tablet Take 1 tablet by mouth daily.       nystatin (MYCOSTATIN) 100,000 unit/mL suspension        sulfaSALAzine (AZULFIDINE ENTAB) 500 MG EC tablet 1,500 mg 2 (two) times a day.        No current facility-administered medications for this visit.       Objective:   Vital Signs:   Visit Vitals  /78   Wt 168 lb (76.2 kg)   BMI 26.31 kg/m         Weight: Provided by patient  Height: Provided by patient  BMI: Provided by patient  Blood Pressure: Provided by patient      VisionScreening:  No exam data present     PHYSICAL  EXAM      Assessment Results 6/16/2020   Activities of Daily Living No help needed   Instrumental Activities of Daily Living No help needed   Get Up and Go Score (No Data)   Mini Cog Total Score 5   Some recent data might be hidden     A Mini-Cog score of 0-2 suggests the possibility of dementia, score of 3-5 suggests no dementia    Identified Health Risks:     Patient's advanced directive was discussed and I am comfortable with the patient's wishes.

## 2021-06-08 NOTE — PATIENT INSTRUCTIONS - HE
Advance Directive  Patient s advance directive was discussed and I am comfortable with the patient s wishes.  Patient Education   Personalized Prevention Plan  You are due for the preventive services outlined below.  Your care team is available to assist you in scheduling these services.  If you have already completed any of these items, please share that information with your care team to update in your medical record.  Health Maintenance   Topic Date Due     DXA SCAN  07/27/2018     TD 18+ HE  03/22/2020     MAMMOGRAM  03/07/2021     MEDICARE ANNUAL WELLNESS VISIT  06/16/2021     FALL RISK ASSESSMENT  06/16/2021     LIPID  11/20/2023     ADVANCE CARE PLANNING  06/16/2025     COLORECTAL CANCER SCREENING  04/27/2027     HEPATITIS C SCREENING  Completed     PNEUMOCOCCAL IMMUNIZATION 65+ LOW/MEDIUM RISK  Completed     INFLUENZA VACCINE RULE BASED  Completed     ZOSTER VACCINES  Completed         Continue annual flu shots every fall    You are due to get your tetanus updated.  You can obtain a Td at your pharmacy (Tdap which includes whooping cough is not needed as you had this in 2010)    Continue annual mammograms and schedule for the summer    Repeat DEXA in summer 2021 for osteoporosis screening    Continue to see her eye doctor every 6 months    Continue to see her dermatologist every year    Continue to see her OB/GYN every year    Next colonoscopy will be due 2027    Have your screening labs including fasting lipid profile and fasting glucose for diabetes screening at your next appointment with your rheumatologist.  I will mail you the order.

## 2021-06-08 NOTE — PROGRESS NOTES
"Antonia Everett is a 70 y.o. female who is being evaluated via a billable video visit.      The patient has been notified of following:     \"This video visit will be conducted via a call between you and your physician/provider. We have found that certain health care needs can be provided without the need for an in-person physical exam.  This service lets us provide the care you need with a video conversation.  If a prescription is necessary we can send it directly to your pharmacy.  If lab work is needed we can place an order for that and you can then stop by our lab to have the test done at a later time.    Video visits are billed at different rates depending on your insurance coverage. Please reach out to your insurance provider with any questions.    If during the course of the call the physician/provider feels a video visit is not appropriate, you will not be charged for this service.\"    Patient has given verbal consent to a Video visit? Yes [phone]    Will anyone else be joining your video visit? No        Video Start Time: 8:25 AM    Additional provider notes: See separate dictation with annual wellness visit and follow-up of medical problems      Video-Visit Details    Type of service:  Video Visit    Video End Time (time video stopped): 8:50 AM  Originating Location (pt. Location): Home    Distant Location (provider location):  [facility] INTERNAL MEDICINE     Platform used for Video Visit: Dominique Chew MD   "

## 2021-06-09 NOTE — TELEPHONE ENCOUNTER
RN cannot approve Refill Request    RN can NOT refill this medication PCP messaged that patient is overdue for Office Visit. Last office visit: 7/24/2017 Rishi Chew MD Last Physical: 11/20/2018 Last MTM visit: Visit date not found Last visit same specialty: 10/15/2018 Jason Peraza MD.  Next visit within 3 mo: Visit date not found  Next physical within 3 mo: Visit date not found      Carol Boss, [facility] Triage/Med Refill 6/20/2020    Requested Prescriptions   Pending Prescriptions Disp Refills     atorvastatin (LIPITOR) 20 MG tablet 90 tablet 0     Sig: Take 1 tablet (20 mg total) by mouth daily. Appointment needed for further refills       Statins Refill Protocol (Hmg CoA Reductase Inhibitors) Failed - 6/20/2020 11:19 AM        Failed - PCP or prescribing provider visit in past 12 months      Last office visit with prescriber/PCP: 7/24/2017 Rishi Chew MD OR same dept: Visit date not found OR same specialty: 10/15/2018 Jason Peraza MD  Last physical: 11/20/2018 Last MTM visit: Visit date not found   Next visit within 3 mo: Visit date not found  Next physical within 3 mo: Visit date not found  Prescriber OR PCP: Rishi Chew MD  Last diagnosis associated with med order: 1. Hypercholesterolemia  - atorvastatin (LIPITOR) 20 MG tablet; Take 1 tablet (20 mg total) by mouth daily. Appointment needed for further refills  Dispense: 90 tablet; Refill: 0    If protocol passes may refill for 12 months if within 3 months of last provider visit (or a total of 15 months).

## 2021-06-09 NOTE — TELEPHONE ENCOUNTER
RN cannot approve Refill Request    RN can NOT refill this medication PCP messaged that patient is overdue for Office Visit. Last office visit: 7/24/2017 Rishi Chew MD Last Physical: 11/20/2018 Last MTM visit: Visit date not found Last visit same specialty: 10/15/2018 Jason Peraza MD.  Next visit within 3 mo: Visit date not found  Next physical within 3 mo: Visit date not found      Caorl Boss, [facility] Triage/Med Refill 6/20/2020    Requested Prescriptions   Pending Prescriptions Disp Refills     atorvastatin (LIPITOR) 20 MG tablet [Pharmacy Med Name: ATORVASTATIN TABS 20MG] 90 tablet 3     Sig: TAKE 1 TABLET DAILY (APPOINTMENT NEEDED FOR FURTHER REFILLS)       Statins Refill Protocol (Hmg CoA Reductase Inhibitors) Failed - 6/20/2020  1:26 PM        Failed - PCP or prescribing provider visit in past 12 months      Last office visit with prescriber/PCP: 7/24/2017 Rishi Chew MD OR same dept: Visit date not found OR same specialty: 10/15/2018 Jason Peraza MD  Last physical: 11/20/2018 Last MTM visit: Visit date not found   Next visit within 3 mo: Visit date not found  Next physical within 3 mo: Visit date not found  Prescriber OR PCP: Rishi Chew MD  Last diagnosis associated with med order: 1. Hypercholesterolemia  - atorvastatin (LIPITOR) 20 MG tablet [Pharmacy Med Name: ATORVASTATIN TABS 20MG]; TAKE 1 TABLET DAILY (APPOINTMENT NEEDED FOR FURTHER REFILLS)  Dispense: 90 tablet; Refill: 3    If protocol passes may refill for 12 months if within 3 months of last provider visit (or a total of 15 months).

## 2021-06-09 NOTE — TELEPHONE ENCOUNTER
Dr. Chew,     Please confirm directions on Premarin cream, dosage and sig were different than last script.     Thanks.    Ariana Tiwari LPN

## 2021-06-09 NOTE — TELEPHONE ENCOUNTER
Medication Question or Clarification  Who is calling: Patient   What medication are you calling about (include dose and sig)?: conjugated estrogens (PREMARIN) vaginal cream  42.5 g  1  1/30/2020     Sig - Route: Insert 0.5 g into the vagina daily. - Vaginal    Sent to pharmacy as: conjugated estrogens 0.625 mg/gram vaginal cream (PREMARIN)    Who prescribed the medication?: Rishi Chew MD  What is your question/concern?: Patient is calling in for conjugated estrogens (PREMARIN) prescription directions per patient last prescription directions are Insert 2 g into the vagina  Four times weekly . - Vagina. Current directions on prescription are Route: Insert 0.5 g into the vagina daily. - Vaginal    Sent to pharmacy as: conjugated estrogens 0.625 mg/gram vaginal cream (PREMARIN)  . Please advise .  Requested Pharmacy: [facility]  Okay to leave a detailed message?: No

## 2021-06-09 NOTE — TELEPHONE ENCOUNTER
I am not sure why I authorized this prescription in the first place.  She should be getting this from her OB/GYN

## 2021-06-10 NOTE — PROGRESS NOTES
Office Visit - Follow Up   Antonia Everett   67 y.o. female    Date of Visit: 4/20/2017    Chief Complaint   Patient presents with     Abdominal Pain     Diarrhea        Assessment and Plan   1. Chronic diarrhea  I suspect microscopic colitis related to nabumetone.  Will discontinue and resume brand name Celebrex.  Unable to tolerate NSAIDs because of history of bleeding ulcer.  I will also make arrangements for diagnostic colonoscopy.  Checking C. difficile as symptoms began after a course of Cipro.  She does take probiotics on a daily basis.  - C. difficile Toxigenic by PCR; Future  - Ambulatory referral for Colonoscopy    2. Hypercholesterolemia  Rechecking lipid profile on atorvastatin  - Lipid Cascade    3. Oral lichen planus  Diagnosed 2016    4. Rheumatoid arthritis  Now on Remicade.  Needing anti-inflammatories for pain.  We will retry brand-name Celebrex.  Generic Celebrex was ineffective and I suspect she is having side effects from nabumetone    5. Bleeding duodenal ulcer  As above, continue to avoid NSAIDs    Return in about 6 months (around 10/20/2017) for Annual physical.     History of Present Illness   This 67 y.o. old woman with rheumatoid arthritis now on Remicade who is here to discuss chronic diarrhea since December 2016.  Symptoms started before beginning Remicade.  Describes some associated abdominal pain and cramping.  Last colonoscopy was nearly 10 years ago.  Denies any blood in her stools.  No unexpected weight loss.  Normal appetite and no nausea or vomiting.  She was on a 5 day course of Cipro last October for UTI about 2 months before symptoms started.  She takes probiotics on a daily basis.  She also was switched from Celebrex to nabumetone last fall.  Insurance will no longer cover Celebrex.  She has a history of bleeding duodenal ulcer and cannot tolerate NSAIDs.  Also diagnosed with oral lichen planus last year.  She is developed a rash in the last several weeks and had skin biopsy  yesterday.  Arthritis is partially better since starting Remicade.  Followed closely by rheumatology.    Review of Systems:  Otherwise, a comprehensive review of systems was negative except as noted.     Medications, Allergies and Problem List   Patient Active Problem List   Diagnosis     Lupus Anticoagulant     Esophageal Reflux     Opioid Dependence With Continuous Use     Lumbar Disc Degeneration     Lumbar radicular syndrome     Lumbar radiculitis     Rheumatoid arthritis     Hypercholesterolemia     Family history of breast cancer     Lumbar radiculopathy     Chronic pain     Bleeding duodenal ulcer     Chronic diarrhea     Oral lichen planus       She has a past surgical history that includes arthrodesis ant interbody min discectomy,lumbar; stereotact stim spinal cord; removal of tonsils,<11 y/o; Total abdominal hysterectomy w/ bilateral salpingoophorectomy; Knee surgery (Right, 2007); Rotator cuff repair (Right); Appendectomy; Skin graft (1996); Colonoscopy (2007); Elbow fracture surgery (2014); Hysterectomy; Abdominal hernia repair; Back surgery; and Breast biopsy (Left).    Allergies   Allergen Reactions     Codeine      nausea     Gabapentin      Fatigue       Pregabalin      Fatigue         Current Outpatient Prescriptions   Medication Sig Dispense Refill     aspirin 81 mg chewable tablet Chew 162 mg daily.       atorvastatin (LIPITOR) 20 MG tablet Take 1 tablet (20 mg total) by mouth daily. 90 tablet 3     chlorhexidine (HIBICLENS) 4 % external liquid Apply topically daily as needed (please start this 5/6/16 one week before your surgery).       diclofenac sodium (VOLTAREN) 1 % Gel Apply 4 g topically. To affected joint 3 - 4 ties daily as needed.       hydroxychloroquine (PLAQUENIL) 200 mg tablet Take 400 mg by mouth daily.        INFLIXIMAB (REMICADE IV) Infuse into a venous catheter.       leflunomide (ARAVA) 10 MG tablet Take 5 mg by mouth daily.        multivitamin therapeutic (THERAGRAN) tablet Take  "1 tablet by mouth daily.       mupirocin (BACTROBAN) 2 % ointment Apply 1 application topically daily.        sulfaSALAzine (AZULFIDINE) 500 mg tablet Take 500 mg by mouth 4 (four) times a day.       triamcinolone (KENALOG) 0.1 % paste Apply 1 application to teeth daily as needed.        CELEBREX 200 mg capsule Take 1 capsule (200 mg total) by mouth 2 (two) times a day. 60 capsule 11     No current facility-administered medications for this visit.         Physical Exam   General Appearance:   Well-appearing middle-aged woman    /72 (Patient Site: Right Arm, Patient Position: Sitting, Cuff Size: Adult Large)  Pulse 80  Ht 5' 7.5\" (1.715 m)  Wt 170 lb (77.1 kg)  SpO2 97%  BMI 26.23 kg/m2    ENT: Lichen planus lesions on tongue  Abdomen soft without hepatosplenomegaly masses or tenderness  Nonspecific erythematous rash present on torso         Additional Information   Social History   Substance Use Topics     Smoking status: Former Smoker     Quit date: 5/11/1970     Smokeless tobacco: None     Alcohol use No            Rishi Chew MD  "

## 2021-06-12 NOTE — PROGRESS NOTES
Office Visit - Follow Up   Antonia Everett   67 y.o. female    Date of Visit: 7/24/2017    Chief Complaint   Patient presents with     hard time sleeping     fungal infecion in mouth     hard time eating     rash        Assessment and Plan   1. Oral lichen planus  We will try oral corticosteroids beginning with 40 mg of prednisone daily for 3 days followed by a taper for a total of 12 days.  I will also get her an appointment with dermatology clinic at [facility]  - Ambulatory referral to Dermatology    2. Rheumatoid arthritis  Followed by Dr. Felipe and now on Cimzia after Remicade caused side effects including rash    No Follow-up on file.     History of Present Illness   This 67 y.o. old woman with rheumatoid arthritis who is experiencing worsening mouth pain secondary to lichen planus.  Diagnosed after mouth biopsy September 2016.  She has tried topical corticosteroids and now using nystatin swish and swallow.  Nothing seems to be helping.  Pain can be severe and is keeping her awake.  She has seen dermatology, oral surgeon, dentist, and periodontist.  No significant improvement.  She is interested in a referral to [facility].  Oral corticosteroids have not been tried.  No longer on Remicade as it caused a rash.  Now on Cimzia.  Reviewed other medications.    Review of Systems: No fevers or chills.  No nausea.  No dysphasia      Medications, Allergies and Problem List   Patient Active Problem List   Diagnosis     Lupus Anticoagulant     Esophageal Reflux     Opioid Dependence With Continuous Use     Lumbar Disc Degeneration     Lumbar radicular syndrome     Lumbar radiculitis     Rheumatoid arthritis     Hypercholesterolemia     Family history of breast cancer     Lumbar radiculopathy     Chronic pain     Bleeding duodenal ulcer     Oral lichen planus     Chronic diarrhea       She has a past surgical history that includes arthrodesis ant interbody min discectomy,lumbar; stereotact stim spinal cord; removal  of tonsils,<13 y/o; Total abdominal hysterectomy w/ bilateral salpingoophorectomy; Knee surgery (Right, 2007); Rotator cuff repair (Right); Appendectomy; Skin graft (1996); Colonoscopy; Elbow fracture surgery (2014); Hysterectomy; Abdominal hernia repair; Back surgery; and Breast biopsy (Left).    Allergies   Allergen Reactions     Codeine      nausea     Gabapentin      Fatigue       Pregabalin      Fatigue         Current Outpatient Prescriptions   Medication Sig Dispense Refill     aspirin 81 mg chewable tablet Chew 162 mg daily.       celecoxib (CELEBREX) 200 MG capsule Take 1 capsule (200 mg total) by mouth 2 (two) times a day. 60 capsule 11     CERTOLIZUMAB PEGOL (CIMZIA SUBQ) Inject under the skin.       chlorhexidine (HIBICLENS) 4 % external liquid Apply topically daily as needed (please start this 5/6/16 one week before your surgery).       dexamethasone 0.5 mg/5 mL elixir        diclofenac sodium (VOLTAREN) 1 % Gel Apply 4 g topically. To affected joint 3 - 4 ties daily as needed.       hydroxychloroquine (PLAQUENIL) 200 mg tablet Take 400 mg by mouth daily.        leflunomide (ARAVA) 10 MG tablet Take 5 mg by mouth daily.        multivitamin therapeutic (THERAGRAN) tablet Take 1 tablet by mouth daily.       mupirocin (BACTROBAN) 2 % ointment Apply 1 application topically daily.        nystatin (MYCOSTATIN) 100,000 unit/mL suspension        sulfaSALAzine (AZULFIDINE ENTAB) 500 MG EC tablet        sulfaSALAzine (AZULFIDINE) 500 mg tablet Take 500 mg by mouth 4 (four) times a day.       triamcinolone (KENALOG) 0.1 % paste Apply 1 application to teeth daily as needed.        predniSONE (DELTASONE) 10 mg tablet 4 tabs daily for 3 days then 3 tabs daily for 3 days then 2 tabs daily for 3 days then 1 tab daily for 3 days 30 tablet 0     No current facility-administered medications for this visit.         Physical Exam   General Appearance:   Well-appearing middle-aged woman    /70 (Patient Site: Right Arm,  "Patient Position: Sitting, Cuff Size: Adult Large)  Pulse 76  Ht 5' 7.5\" (1.715 m)  Wt 162 lb (73.5 kg)  SpO2 100%  BMI 25 kg/m2        Multiple shallow ulcerations involving tongue and buccal mucosa      Additional Information   Social History   Substance Use Topics     Smoking status: Former Smoker     Quit date: 5/11/1970     Smokeless tobacco: Never Used     Alcohol use No              Rishi Chew MD  "

## 2021-06-13 NOTE — PROGRESS NOTES
Assessment and Plan:       1. Medicare annual wellness visit, initial  Immunizations are reviewed and will provide flu shot and Pneumovax.  She has a living will.  Non-smoker.  Uses alcohol in moderation.  Regular exercise discussed.  Up to date with colonoscopies and this should be repeated in 10 years.  Breast exam completed and she will continue to get a mammogram annually.  She has had complete hysterectomy.  Last DEXA was 2016 and this should be repeated 3-5 years as it was completely normal. Dementia and depression screening completed.  She sees an ophthalmologist regularly and gets glaucoma screening.  Skin exam performed and recommending regular use of sunblock.  Hepatitis C antibody for screening.  Will screen for diabetes with fasting glucose.    - Hepatitis C Antibody (Anti-HCV)    2. Need for immunization against influenza    - Influenza High Dose, Seasonal 65+ yrs    3. Rheumatoid arthritis  Stable with current medications.  Followed by rheumatology.  - Comprehensive Metabolic Panel  - 2(CBC w/o Differential)    4. Chronic low back pain without sciatica  Evaluated at [facility] who is recommending workup for ankylosing spondylitis with x-ray of SI joints and HLA-B27 along with lupus screening which will be completed today  - XR Sacroliliac Joints 1 Or 2 VWS; Future  - Antinuclear Antibodies Screen (MARISEL)  - HLA-B27 Antigen    5. Hypercholesterolemia  Recheck lipid profile on atorvastatin and monitor LFTs.  Continue aspirin 81 mg daily  - Lipid Cascade    6. Oral lichen planus  She is using clobetasol and Protopic orally to control symptoms    7. Healthcare maintenance  We will check urinalysis for screening  - Urinalysis      The patient's current medical problems were reviewed.    Over 25 minutes was spent addressing these chronic and new medical problems beyond time spent performing annual wellness visit with over 50% of the time spent counseling and coordination of care    I have had an Advance  Directives discussion with the patient.  The following health maintenance schedule was reviewed with the patient and provided in printed form in the after visit summary:   Health Maintenance   Topic Date Due     DXA SCAN  07/27/2018     FALL RISK ASSESSMENT  11/03/2018     MAMMOGRAM  01/06/2019     TD 18+ HE  03/22/2020     ADVANCE DIRECTIVES DISCUSSED WITH PATIENT  11/03/2022     COLONOSCOPY  04/27/2027     PNEUMOCOCCAL POLYSACCHARIDE VACCINE AGE 65 AND OVER  Completed     INFLUENZA VACCINE RULE BASED  Completed     PNEUMOCOCCAL CONJUGATE VACCINE FOR ADULTS (PCV13 OR PREVNAR)  Completed     ZOSTER VACCINE  Completed        Subjective:   Chief Complaint: Antonia Everett is an 67 y.o. female here for an Annual Wellness visit.   HPI: In addition to wellness visit, multiple chronic medical problems addressed.  She has rheumatoid arthritis and this is relatively stable with current medications.  She continues on Simponi along with usual medications.  She is having persistent oral lichen planus not responding to treatment and she was evaluated at [facility].  I reviewed these records.  Symptoms are currently manageable using clobetasol or Protopic or Aleve.  Recently completed Diflucan.  Using nystatin swish and swallow.  She is having ongoing pain across her low back and ankylosing spondylitis is being question.  Rheumatoid factor was normal as was her anti-CCP antibody.  [facility] is also recommending screening for lupus with AN A.  Recommending SI joint x-rays and HLA-B27.  Recently saw dermatology and had lesion biopsied.  She needs stitches removed.  She is seeing her eye doctor every 6 months.  Continues on atorvastatin to manage lipids.    Review of Systems:    Please see above.  The rest of the review of systems are negative for all systems.    Patient Care Team:  Rishi Chew MD as PCP - General (Internal Medicine)  Mireya Howard MD (Dermatology)  Gera Lopez MD as Physician  (Ophthalmology)  Mireya Felipe MD as Physician (Rheumatology)     Patient Active Problem List   Diagnosis     Lupus Anticoagulant     Esophageal Reflux     Opioid Dependence With Continuous Use     Lumbar Disc Degeneration     Lumbar radicular syndrome     Lumbar radiculitis     Rheumatoid arthritis     Hypercholesterolemia     Family history of breast cancer     Lumbar radiculopathy     Chronic pain     Bleeding duodenal ulcer     Oral lichen planus     Chronic diarrhea     Chronic left shoulder pain     Chronic low back pain without sciatica     Past Medical History:   Diagnosis Date     Bleeding duodenal ulcer 2010     Chronic diarrhea 04/20/2017    Colonoscopy normal except inflammatory polyp or     Chronic left shoulder pain 9/26/2017     Chronic low back pain without sciatica 11/3/2017     Chronic pain      Chronic radicular low back pain     Pain clinic.  Nerve stimulator placement     Chronic radicular low back pain      Duodenal ulcer     bleeding     Family history of breast cancer      GERD (gastroesophageal reflux disease)      H/O ulcer disease     secondary to medications     Herpes zoster 2010     History of transfusion      Hypercholesterolemia      Hypercholesterolemia      LA (lupus anticoagulant) disorder      Left hip pain 2015    bursitis, evaluated by orthopedics     MRSA (methicillin resistant staph aureus) culture positive     betweeen 1996 and 2000     Opioid dependence      Oral lichen planus 4/20/2017     Plantar fasciitis      Rheumatoid arthritis     Followed by rheumatology, Dr. Felipe,      Rheumatoid arthritis      Screening     DEXA normal T score -0.9 2016, minimal change compared to previous DEXA     VRE (vancomycin resistant enterococcus) culture positive     between 1996 and 2000      Past Surgical History:   Procedure Laterality Date     ABDOMINAL HERNIA REPAIR       APPENDECTOMY       BACK SURGERY       BREAST BIOPSY Left      COLONOSCOPY      Normal colonoscopy May 2017  except inflammatory polyp     ELBOW FRACTURE SURGERY  2014     HYSTERECTOMY      LUIS ANGEL with BSO     KNEE SURGERY Right 2007    Arthroscopic knee surgery     MA ARTHRODESIS ANT INTERBODY MIN DISCECTOMY,LUMBAR      Description: Lumbar Vertebral Fusion;  Recorded: 09/30/2011;  Comments: L5-S1 fusion     MA REMOVAL OF TONSILS,<13 Y/O      Description: Tonsillectomy;  Recorded: 09/30/2011;     MA STEREOTACT STIM SPINAL CORD      Description: Spinal Stereotaxis Stimulation Of Cord;  Recorded: 09/30/2011;  Comments: implanted 8/6/2007 with lead revision 2011     ROTATOR CUFF REPAIR Right      SKIN GRAFT  1996     TOTAL ABDOMINAL HYSTERECTOMY W/ BILATERAL SALPINGOOPHORECTOMY        Family History   Problem Relation Age of Onset     Breast cancer Mother 74     Stroke Mother      Lung cancer Father 70     Breast cancer Maternal Aunt 70     Liver cancer Brother 48      Social History     Social History     Marital status:      Spouse name: N/A     Number of children: N/A     Years of education: N/A     Occupational History     Not on file.     Social History Main Topics     Smoking status: Former Smoker     Quit date: 5/11/1970     Smokeless tobacco: Never Used     Alcohol use No     Drug use: No     Sexual activity: Not on file     Other Topics Concern     Not on file     Social History Narrative      Current Outpatient Prescriptions   Medication Sig Dispense Refill     aspirin 81 mg chewable tablet Chew 162 mg daily.       atorvastatin (LIPITOR) 20 MG tablet TAKE 1 TABLET (20 MG TOTAL) BY MOUTH DAILY. 90 tablet 3     celecoxib (CELEBREX) 200 MG capsule Take 1 capsule (200 mg total) by mouth 2 (two) times a day. 60 capsule 11     clobetasol (TEMOVATE) 0.05 % Gel APPLY TOPICALLY TO THE MOUTH TWICE DAILY  6     conjugated estrogens (PREMARIN) vaginal cream Inject half gram into the vagina 3 times per week 42.5 g 11     diclofenac sodium (VOLTAREN) 1 % Gel Apply 4 g topically. To affected joint 3 - 4 ties daily as needed.    "    fluocinolone (SYNALAR) 0.01 % external solution APPLY TO AFFECTED AREA(S) ON SCALP TWICE DAILY  6     fluocinonide (LIDEX) 0.05 % ointment APPLY TO AFFECTED RASH AREA(S) ON BODY TWICE DAILY ON SATURDAY AND SUNDAY  6     GOLIMUMAB (SIMPONI ARIA IV) Infuse into a venous catheter.       hydrocortisone 2.5 % cream APPLY TO AFFECTED RASH AREA(S) ON FACE TWICE DAILY  5     hydroxychloroquine (PLAQUENIL) 200 mg tablet Take 400 mg by mouth daily.        leflunomide (ARAVA) 10 MG tablet Take 5 mg by mouth daily.        multivitamin therapeutic (THERAGRAN) tablet Take 1 tablet by mouth daily.       nystatin (MYCOSTATIN) 100,000 unit/mL suspension        sulfaSALAzine (AZULFIDINE ENTAB) 500 MG EC tablet 1,500 mg 2 (two) times a day.        tacrolimus (PROTOPIC) 0.1 % ointment apply in mouth twice daily  6     triamcinolone (KENALOG) 0.1 % paste Apply 1 application to teeth daily as needed.        No current facility-administered medications for this visit.       Objective:   Vital Signs:   Visit Vitals     /64 (Patient Site: Left Arm, Patient Position: Sitting, Cuff Size: Adult Regular)     Pulse 87     Ht 5' 7.5\" (1.715 m)     Wt 167 lb (75.8 kg)     SpO2 98%     BMI 25.77 kg/m2            PHYSICAL EXAM  EYES: Eyelids, conjunctiva, and sclera were normal. Pupils were normal. Cornea, iris, and lens were normal bilaterally.  HEAD, EARS, NOSE, MOUTH, AND THROAT: Head and face were normal. Nose appearance was normal and there was no discharge. Oropharynx was normal.  NECK: Neck appearance was normal. There were no neck masses and the thyroid was not enlarged and no nodules are felt.  No lymphadenopathy.  RESPIRATORY: Breathing pattern was normal and the chest moved symmetrically.  Percussion/auscultatory percussion was normal.  Lung sounds were normal and there were no rales or wheezes.  CARDIOVASCULAR: Heart rate and rhythm were normal.  S1 and S2 were normal and there were no extra sounds or murmurs. Peripheral pulses " in arms and legs were normal.  Jugular venous pressure was normal.  There was no peripheral edema.  No carotid bruits.  BREAST: No palpable masses or tenderness.  No axillary nodes.  GASTROINTESTINAL: The abdomen was normal in contour.  Bowel sounds were present.   Palpation detected no tenderness, mass, or enlarged organs.   MUSCULOSKELETAL: Skeletal configuration was normal and muscle mass was normal for age. Joint appearance was overall normal.  LYMPHATIC: There were no enlarged nodes.  SKIN/HAIR/NAILS: Skin color was normal.  Hair and nails were normal.There were no skin lesions.  Sutures removed from right arm.  Lesion healing well.  NEUROLOGIC: The patient was alert and oriented to person, place, time, and circumstance. Speech was normal. Cranial nerves were normal. Motor strength was normal for age. The patient was normally coordinated.  Sensation intact.  PSYCHIATRIC:  Mood and affect were normal and the patient had normal recent and remote memory. The patient's judgment and insight were normal.    Assessment Results 11/3/2017   Activities of Daily Living No help needed   Instrumental Activities of Daily Living No help needed   Get Up and Go Score Less than 12 seconds   Mini Cog Total Score 5   Some recent data might be hidden     A Mini-Cog score of 0-2 suggests the possibility of dementia, score of 3-5 suggests no dementia    Identified Health Risks:     Patient's advanced directive was discussed and I am comfortable with the patient's wishes.

## 2021-06-13 NOTE — PROGRESS NOTES
OFFICE VISIT NOTE    Subjective:   Chief Complaint:  Urinary Tract Infection (having frquency/urgency, dysuria, no burning since July. has been on Cipro in July and Bactrim in August. gets infusions for RA and thinks this causes her Sx's) and Shoulder Pain (left shoulder, rotator cuff has had for years but has become constant and now having trouble sleeping)    67-year-old woman with rheumatoid arthritis and is on several medicines for her arthritis.  She has a day with complaint of urinary frequency.  She will get some suprapubic discomfort spasms and then some dysuria.  No hematuria.  No flank pain or fevers or chills.  She has been treated in the past twice with antibiotics but really never improved while taking the antibiotic.  Second problem is pain and she points to the left shoulder.  She says is similar to the rotator cuff problem she had in the past on the right side.  It hurts her to lift the arm at the shoulder.  No recent injury.    Current Outpatient Prescriptions   Medication Sig     aspirin 81 mg chewable tablet Chew 162 mg daily.     atorvastatin (LIPITOR) 20 MG tablet TAKE 1 TABLET (20 MG TOTAL) BY MOUTH DAILY.     celecoxib (CELEBREX) 200 MG capsule Take 1 capsule (200 mg total) by mouth 2 (two) times a day.     clobetasol (TEMOVATE) 0.05 % Gel APPLY TOPICALLY TO THE MOUTH TWICE DAILY     diclofenac sodium (VOLTAREN) 1 % Gel Apply 4 g topically. To affected joint 3 - 4 ties daily as needed.     fluocinolone (SYNALAR) 0.01 % external solution APPLY TO AFFECTED AREA(S) ON SCALP TWICE DAILY     fluocinonide (LIDEX) 0.05 % ointment APPLY TO AFFECTED RASH AREA(S) ON BODY TWICE DAILY ON SATURDAY AND SUNDAY     hydrocortisone 2.5 % cream APPLY TO AFFECTED RASH AREA(S) ON FACE TWICE DAILY     hydroxychloroquine (PLAQUENIL) 200 mg tablet Take 400 mg by mouth daily.      leflunomide (ARAVA) 10 MG tablet Take 5 mg by mouth daily.      multivitamin therapeutic (THERAGRAN) tablet Take 1 tablet by mouth daily.  "    nystatin (MYCOSTATIN) 100,000 unit/mL suspension      sulfaSALAzine (AZULFIDINE ENTAB) 500 MG EC tablet      tacrolimus (PROTOPIC) 0.1 % ointment APPLY TO AFFECTED RASH AREA(S) ON BODY TWICE DAILY ON MONDAY THROUGH FRIDAY     triamcinolone (KENALOG) 0.1 % paste Apply 1 application to teeth daily as needed.      conjugated estrogens (PREMARIN) vaginal cream Inject half gram into the vagina 3 times per week       Review of Systems:  A comprehensive review of systems is negative except for the comments above    Objective:    /86  Pulse 74  Temp 97.6  F (36.4  C)  Ht 5' 7.5\" (1.715 m)  Wt 167 lb (75.8 kg)  SpO2 99%  BMI 25.77 kg/m2  GENERAL: No acute distress.  Afebrile.  Today's urinalysis is completely normal.  On examination of the pelvic region she seems to have significant urinary atrophy and mucosal atrophy.  No ulcerations.  Pain with abducting the left arm at the shoulder.  Triceps and biceps range of motion are excellent.  Circulation sensation intact.    Assessment & Plan   Antonia Everett is a 67 y.o. female.    Frequency and suprapubic discomfort.  This could be on the basis of genitourinary atrophy as no evidence today of bladder infection.  I would like her to try Premarin cream 0.5 g 3 times a week and see if she starts to improve  She asked for cortisone shot.  I did cleanse the skin with Betadine and alcohol.  I then injected 3 cc of Xylocaine and 40 mg of Kenalog 40 into the left shoulder using a posterior approach.  A 27-gauge needle was used without any complication.  She has been instructed to ice the injection site for 20 minutes when she gets home.  She can resume range of motion activities and 48 hours.    Diagnoses and all orders for this visit:    UTI (urinary tract infection)  -     Urinalysis-UC if Indicated    Atrophic vaginitis    Rotator cuff tendinitis, left    Other orders  -     triamcinolone acetonide 40 mg/mL injection 40 mg (KENALOG-40); Inject 1 mL (40 mg total) into " the joint once.  -     conjugated estrogens (PREMARIN) vaginal cream; Inject half gram into the vagina 3 times per week  Dispense: 42.5 g; Refill: 1        Cruz Mckeon MD  Transcription using voice recognition software, may contain typographical errors.

## 2021-06-15 PROBLEM — L43.8 ORAL LICHEN PLANUS: Status: ACTIVE | Noted: 2017-04-20

## 2021-06-16 PROBLEM — N90.4 LICHEN SCLEROSUS OF FEMALE GENITALIA: Status: ACTIVE | Noted: 2020-06-16

## 2021-06-16 PROBLEM — I73.00 RAYNAUD'S DISEASE WITHOUT GANGRENE: Status: ACTIVE | Noted: 2018-11-20

## 2021-06-16 PROBLEM — G89.29 CHRONIC LOW BACK PAIN WITHOUT SCIATICA: Status: ACTIVE | Noted: 2017-11-03

## 2021-06-16 PROBLEM — M54.50 CHRONIC LOW BACK PAIN WITHOUT SCIATICA: Status: ACTIVE | Noted: 2017-11-03

## 2021-06-16 PROBLEM — M75.122 COMPLETE TEAR OF LEFT ROTATOR CUFF: Status: ACTIVE | Noted: 2018-04-03

## 2021-06-17 NOTE — PROGRESS NOTES
Preoperative Exam    Scheduled Procedure: Left Rotator Cuff Repair  Surgery Date:  04/09/2018  Surgery Location: [facility], [place]    Surgeon:  Dr Akhtar    Assessment/Plan:     1. Encounter for preoperative examination for general surgical procedure  No contraindications to proceeding with planned surgery and anesthesia.  Overall risk is low.  She will avoid aspirin and NSAIDs for 1 week prior to surgery.  DVT prophylaxis per usual protocol including early ambulation.  - HM2(CBC w/o Differential)    2. Complete tear of left rotator cuff  Surgery planned as above    3. Rheumatoid arthritis  Chronic immunosuppression with hydroxychloroquine, sulfasalazine and Simponi.  We discussed that she is at increased risk for postoperative infection.        Surgical Procedure Risk: Intermediate (reported cardiac risk generally 1-5%)  Have you had prior anesthesia?: Yes  Have you or any family members had a previous anesthesia reaction:  No  Do you or any family members have a history of a clotting or bleeding disorder?: No  Cardiac Risk Assessment: no increased risk for major cardiac complications    Patient approved for surgery with general or local anesthesia.        Functional Status: Independent  Patient plans to recover at home with family.     Subjective:      Antonia Everett is a 67 y.o. female who presents for a preoperative consultation.  Chronic pain involving left shoulder with the past year.  Diagnosed with left rotator cuff tear with plans for arthroscopic surgery next week.  Had right rotator cuff repaired over 10 years ago.  Has had no problems with previous surgery or anesthesia.  No history of coronary artery disease, congestive heart failure, or chronic respiratory problems.  Denies exertional chest pain.  She does have rheumatoid arthritis and is on chronic immunosuppression with Simponi administered every 2 months.  Last dose was 1 week ago.  Also takes sulfasalazine and hydroxychloroquine.    All other systems  reviewed and are negative, other than those listed in the HPI.    Pertinent History  Do you have difficulty breathing or chest pain after walking up a flight of stairs: No  History of obstructive sleep apnea: No  Steroid use in the last 6 months: No  Frequent Aspirin/NSAID use: No  Prior Blood Transfusion: No  Prior Blood Transfusion Reaction: No      Current Outpatient Prescriptions   Medication Sig Dispense Refill     aspirin 81 mg chewable tablet Chew 162 mg daily.       atorvastatin (LIPITOR) 20 MG tablet TAKE 1 TABLET (20 MG TOTAL) BY MOUTH DAILY. 90 tablet 3     celecoxib (CELEBREX) 200 MG capsule Take 1 capsule (200 mg total) by mouth 2 (two) times a day. 60 capsule 11     conjugated estrogens (PREMARIN) vaginal cream Inject two grams into the vagina 4 times per week 30 g 11     diclofenac sodium (VOLTAREN) 1 % Gel Apply 4 g topically. To affected joint 3 - 4 ties daily as needed.       GOLIMUMAB (SIMPONI ARIA IV) Infuse into a venous catheter.       hydroxychloroquine (PLAQUENIL) 200 mg tablet Take 400 mg by mouth daily.        multivitamin therapeutic (THERAGRAN) tablet Take 1 tablet by mouth daily.       nystatin (MYCOSTATIN) 100,000 unit/mL suspension        sulfaSALAzine (AZULFIDINE ENTAB) 500 MG EC tablet 1,500 mg 2 (two) times a day.        No current facility-administered medications for this visit.         Allergies   Allergen Reactions     Codeine      nausea     Gabapentin      Fatigue       Pregabalin      Fatigue         Patient Active Problem List   Diagnosis     Lupus Anticoagulant     Esophageal Reflux     Opioid Dependence With Continuous Use     Lumbar Disc Degeneration     Lumbar radicular syndrome     Lumbar radiculitis     Rheumatoid arthritis     Hypercholesterolemia     Family history of breast cancer     Lumbar radiculopathy     Chronic pain     Bleeding duodenal ulcer     Oral lichen planus     Chronic diarrhea     Chronic low back pain without sciatica     Complete tear of left  rotator cuff       Past Medical History:   Diagnosis Date     Bleeding duodenal ulcer 2010     Chronic diarrhea 04/20/2017    Colonoscopy normal except inflammatory polyp or     Chronic left shoulder pain 9/26/2017     Chronic low back pain without sciatica 11/3/2017     Chronic pain      Chronic radicular low back pain     Pain clinic.  Nerve stimulator placement     Chronic radicular low back pain      Complete tear of left rotator cuff 4/3/2018     Duodenal ulcer     bleeding     Family history of breast cancer      GERD (gastroesophageal reflux disease)      H/O ulcer disease     secondary to medications     Herpes zoster 2010     History of transfusion      Hypercholesterolemia      Hypercholesterolemia      LA (lupus anticoagulant) disorder      Left hip pain 2015    bursitis, evaluated by orthopedics     MRSA (methicillin resistant staph aureus) culture positive     betweeen 1996 and 2000     Opioid dependence      Oral lichen planus 4/20/2017     Plantar fasciitis      Rheumatoid arthritis     Followed by rheumatology, Dr. Felipe,      Rheumatoid arthritis      Screening     DEXA normal T score -0.9 2016, minimal change compared to previous DEXA     VRE (vancomycin resistant enterococcus) culture positive     between 1996 and 2000       Past Surgical History:   Procedure Laterality Date     ABDOMINAL HERNIA REPAIR       APPENDECTOMY       BACK SURGERY       BREAST BIOPSY Left      COLONOSCOPY      Normal colonoscopy May 2017 except inflammatory polyp     ELBOW FRACTURE SURGERY  2014     HYSTERECTOMY      LUIS ANGEL with BSO     KNEE SURGERY Right 2007    Arthroscopic knee surgery     LA ARTHRODESIS ANT INTERBODY MIN DISCECTOMY,LUMBAR      Description: Lumbar Vertebral Fusion;  Recorded: 09/30/2011;  Comments: L5-S1 fusion     LA REMOVAL OF TONSILS,<11 Y/O      Description: Tonsillectomy;  Recorded: 09/30/2011;     LA STEREOTACT STIM SPINAL CORD      Description: Spinal Stereotaxis Stimulation Of Cord;  Recorded:  "09/30/2011;  Comments: implanted 8/6/2007 with lead revision 2011     ROTATOR CUFF REPAIR Right      SKIN GRAFT  1996     TOTAL ABDOMINAL HYSTERECTOMY W/ BILATERAL SALPINGOOPHORECTOMY         Social History     Social History     Marital status:      Spouse name: N/A     Number of children: N/A     Years of education: N/A     Occupational History     Not on file.     Social History Main Topics     Smoking status: Former Smoker     Quit date: 5/11/1970     Smokeless tobacco: Never Used     Alcohol use No     Drug use: No     Sexual activity: Not on file     Other Topics Concern     Not on file     Social History Narrative           Objective:     Vitals:    04/03/18 1352   BP: 112/70   Pulse: 91   SpO2: 97%   Weight: 173 lb (78.5 kg)   Height: 5' 7.5\" (1.715 m)         Physical Exam:  HEENT normal  RESPIRATORY: Breathing pattern was normal and the chest moved symmetrically.   Lung sounds were normal and there were no rales or wheezes.  CARDIOVASCULAR: Heart rate and rhythm were normal.  S1 and S2 were normal and there were no extra sounds or murmurs. Peripheral pulses in arms and legs were normal.  Jugular venous pressure was normal.  There was no peripheral edema.  No carotid bruits.  GASTROINTESTINAL: The abdomen was normal in contour.  Bowel sounds were present.   Palpation detected no tenderness, mass, or enlarged organs.   SKIN/HAIR/NAILS: No cyanosis or pallor  NEUROLOGIC: Grossly nonfocal  PSYCHIATRIC:  Mood and affect were normal     Patient Instructions     Hold all supplements, aspirin and NSAIDs for 7 days prior to surgery.  Follow your surgeon's direction on when to stop eating and drinking prior to surgery.  Your surgeon will be managing your pain after your surgery.    Hold all usual medications on morning of surgery          Labs:  Hemoglobin 14.0 platelet 209    Immunization History   Administered Date(s) Administered     Influenza high dose, seasonal 11/03/2017     Pneumo Conj 13-V " (2010&after) 07/19/2016     Pneumo Polysac 23-V 11/03/2017     Tdap 03/22/2010           Electronically signed by Rishi Chew MD 04/03/18 1:54 PM

## 2021-06-19 NOTE — PROGRESS NOTES
"  Office Visit - Follow Up   Antonia Everett   68 y.o. female    Date of Visit: 8/1/2018    Chief Complaint   Patient presents with     Urinary Tract Infection     lower abdominal pain, urgency, frequency, and burning with urination x1 week         Assessment and Plan   1. Urinary tract infection  We will treat with Keflex 500 mg 3 times daily for 7 days.  Urine culture  - Urinalysis-UC if Indicated  - Culture, Urine            No Follow-up on file.     History of Present Illness   This 68 y.o. old was in complaining of a one-week history of frequency and burning with urination.  She occasionally gets some bladder pains that sound like a bit of spasm.  Reports no fevers.  No flank pain.  She has had bladder infections in the past.  No chronic urinary tract infections.    Review of Systems: A comprehensive review of systems was negative except as noted.     Medications, Allergies and Problem List   Reviewed and updated     Physical Exam   General Appearance:       /80  Pulse 75  Temp 98.1  F (36.7  C) (Oral)   Ht 5' 7.2\" (1.707 m)  Wt 172 lb (78 kg)  SpO2 96%  BMI 26.78 kg/m2    No CVA tenderness bilaterally.  Bladder does not feel distended.    Recent Results (from the past 168 hour(s))   Urinalysis-UC if Indicated   Result Value Ref Range    Color, UA Yellow Colorless, Yellow, Straw, Light Yellow    Clarity, UA Clear Clear    Glucose, UA Negative Negative    Bilirubin, UA Negative Negative    Ketones, UA Negative Negative    Specific Gravity, UA 1.020 1.005 - 1.030    Blood, UA Negative Negative    pH, UA 5.5 5.0 - 8.0    Protein, UA Negative Negative mg/dL    Urobilinogen, UA 0.2 E.U./dL 0.2 E.U./dL, 1.0 E.U./dL    Nitrite, UA Negative Negative    Leukocytes, UA Small (!) Negative    Bacteria, UA Moderate (!) None Seen hpf    RBC, UA None Seen None Seen, 0-2 hpf    WBC, UA 5-10 (!) None Seen, 0-5 hpf    Squam Epithel, UA 0-5 None Seen, 0-5 lpf          Additional Information   Current Outpatient " Prescriptions   Medication Sig Dispense Refill     aspirin 81 mg chewable tablet Chew 162 mg daily.       atorvastatin (LIPITOR) 20 MG tablet TAKE 1 TABLET (20 MG TOTAL) BY MOUTH DAILY. 90 tablet 3     celecoxib (CELEBREX) 200 MG capsule TAKE 1 CAPSULE BY MOUTH TWICE DAILY 60 capsule 3     conjugated estrogens (PREMARIN) vaginal cream Inject two grams into the vagina 4 times per week 30 g 11     diclofenac sodium (VOLTAREN) 1 % Gel Apply 4 g topically. To affected joint 3 - 4 ties daily as needed.       GOLIMUMAB (SIMPONI ARIA IV) Infuse into a venous catheter every 2 (two) months.        hydroxychloroquine (PLAQUENIL) 200 mg tablet Take 400 mg by mouth daily.        multivitamin therapeutic (THERAGRAN) tablet Take 1 tablet by mouth daily.       nystatin (MYCOSTATIN) 100,000 unit/mL suspension        sulfaSALAzine (AZULFIDINE ENTAB) 500 MG EC tablet 1,500 mg 2 (two) times a day.        cephalexin (KEFLEX) 500 MG capsule Take 1 capsule (500 mg total) by mouth 3 (three) times a day for 7 days. 40 capsule 0     No current facility-administered medications for this visit.      Allergies   Allergen Reactions     Codeine      nausea     Gabapentin      Fatigue       Pregabalin      Fatigue       Social History   Substance Use Topics     Smoking status: Former Smoker     Quit date: 5/11/1970     Smokeless tobacco: Never Used     Alcohol use No       Review and/or order of clinical lab tests:  Review and/or order of radiology tests:  Review and/or order of medicine tests:  Discussion of test results with performing physician:  Decision to obtain old records and/or obtain history from someone other than the patient:  Review and summarization of old records and/or obtaining history from someone other than the patient and.or discussion of case with another health care provider:  Independent visualization of image, tracing or specimen itself:    Time: total time spent with the patient was 15 minutes of which >50% was spent in  counseling and coordination of care     Juancho Ann MD

## 2021-06-21 NOTE — PROGRESS NOTES
[facility] Follow Up Note    Antonia Everett   68 y.o. female    Date of Visit: 10/15/2018    Chief Complaint   Patient presents with     URI     Cough/cold syptoms X 3 weeks- worse over the past weekend     Subjective  This is a 68-year-old lady who is a patient of Dr. Rishi Colón.  She comes in with symptoms of a respiratory infection.  The symptoms started about 3 weeks ago and felt like it was a simple viral infection.  Over the past 3-4 days however her symptoms have worsened.  She has noticed a low-grade fever, sore throat, dry cough, night sweats and headaches.  The symptoms seem to get worse as the weekend progressed so she thought she should be seen today.  She is otherwise been in her usual state of health.    ROS A comprehensive review of systems was performed and was otherwise negative    Medications, allergies, and problem list were reviewed and updated    Exam  General Appearance:   Examination her blood pressure is 128/70.  Weight is 170 pounds and BMI is 26.55.  Temperature is 98.3 and O2 saturation is 96%.    Heart is in a sinus rhythm with a rate of 115 and no ectopy.    Lungs are clear.    No enlarged cervical lymph nodes.    Throat appears normal.    There is some facial tenderness over the frontal sinuses.      Assessment/Plan  1. Upper respiratory infection       Persistent respiratory infection.  Given the worsening symptoms and the length of time I will put her on some Zithromax.  We will also give her some Tessalon for the cough.  I advised her to follow-up at the end of the week if she is not making progress.  Body Mass Index was not assessed due to Patient was in with an acute medical issue..    Jason Peraza MD      Current Outpatient Prescriptions on File Prior to Visit   Medication Sig     aspirin 81 mg chewable tablet Chew 162 mg daily.     atorvastatin (LIPITOR) 20 MG tablet Take 1 tablet (20 mg total) by mouth daily.     celecoxib (CELEBREX) 200 MG  capsule TAKE 1 CAPSULE BY MOUTH TWICE DAILY     conjugated estrogens (PREMARIN) vaginal cream Inject two grams into the vagina 4 times per week     diclofenac sodium (VOLTAREN) 1 % Gel Apply 4 g topically. To affected joint 3 - 4 ties daily as needed.     GOLIMUMAB (SIMPONI ARIA IV) Infuse into a venous catheter every 2 (two) months.      hydroxychloroquine (PLAQUENIL) 200 mg tablet Take 400 mg by mouth daily.      multivitamin therapeutic (THERAGRAN) tablet Take 1 tablet by mouth daily.     nystatin (MYCOSTATIN) 100,000 unit/mL suspension      sulfaSALAzine (AZULFIDINE ENTAB) 500 MG EC tablet 1,500 mg 2 (two) times a day.      No current facility-administered medications on file prior to visit.      Allergies   Allergen Reactions     Codeine      nausea     Gabapentin      Fatigue       Pregabalin      Fatigue       Social History   Substance Use Topics     Smoking status: Former Smoker     Quit date: 5/11/1970     Smokeless tobacco: Never Used     Alcohol use No

## 2021-06-21 NOTE — PROGRESS NOTES
Assessment and Plan:       1. Medicare annual wellness visit, subsequent  Immunizations are reviewed and will provide flu shot.  Recommending Shingrix.  She has a living will.  Non-smoker.  Uses alcohol in moderation.  Exercising on a regular basis.  Up to date with colonoscopies and this should be repeated in 2027.  Breast exam completed and she will continue to get a mammogram annually.  She has had complete hysterectomy.  Last DEXA was 2016 and was normal and this should be repeated in 1-2 years. Dementia and depression screening completed.  She sees an ophthalmologist regularly and gets glaucoma screening.  Skin exam performed and recommending regular use of sunblock.  Hepatitis C antibody was normal.  Will screen for diabetes with fasting glucose.      2. Rheumatoid arthritis involving multiple sites, unspecified rheumatoid factor presence (H)  Followed by rheumatology and remains on Simponi, sulfasalazine, and hydroxychloroquine.  Symptoms well controlled.  Reviewed recent labs including normal renal function and LFTs along with CBC.    3. Hypercholesterolemia  Recheck lipid profile on atorvastatin.  We discussed the pros and cons of taking aspirin.  Given increased risk of GI bleeding especially when combined with Celebrex and with history of bleeding ulcer, it would be reasonable for her to discontinue.  She will consider.  - Lipid [place]    4. Chronic bilateral low back pain without sciatica  Chronic low back pain.  Using Celebrex 200 mg twice daily.  Has participated in physical therapy several times.  Tries to keep up with regular home exercises.    5. Complete tear of left rotator cuff  She had surgery earlier this year but is having ongoing symptoms.  Physical therapy since April.  Injection planned.    6. Oral lichen planus  Using nystatin swish and swallow.  Ongoing symptoms but currently reasonably well controlled    7. Encounter for screening for diabetes mellitus    - Glucose    8.  Raynaud's  phenomenon both feet describing burning and cold sensation.  Suspect related to inflammatory arthritis.  We discussed nifedipine as an option if symptoms worsen.  She can also discussed with the rheumatologist.        The patient's current medical problems were reviewed.    Over 25 minutes was spent addressing these chronic and new medical problems beyond time spent performing annual wellness visit with over 50% of the time spent counseling and coordination of care    I have had an Advance Directives discussion with the patient.  The following health maintenance schedule was reviewed with the patient and provided in printed form in the after visit summary:   Health Maintenance   Topic Date Due     ZOSTER VACCINES (2 of 2) 09/13/2016     DXA SCAN  07/27/2018     FALL RISK ASSESSMENT  11/20/2019     MAMMOGRAM  01/30/2020     TD 18+ HE  03/22/2020     ADVANCE DIRECTIVES DISCUSSED WITH PATIENT  11/03/2022     COLONOSCOPY  04/27/2027     PNEUMOCOCCAL POLYSACCHARIDE VACCINE AGE 65 AND OVER  Completed     INFLUENZA VACCINE RULE BASED  Completed     PNEUMOCOCCAL CONJUGATE VACCINE FOR ADULTS (PCV13 OR PREVNAR)  Completed        Subjective:   Chief Complaint: Antonia Everett is an 68 y.o. female here for an Annual Wellness visit.   HPI: In addition to wellness visit, multiple chronic medical problems discussed at today's visit.    Rheumatoid arthritis, followed by rheumatology and remains on Simponi, sulfasalazine, and Plaquenil.  Using Celebrex 200 mg twice daily.  Symptoms generally under control.  Pain is tolerable.  Labs regularly monitored.  Seeing her eye doctor annually.    Chronic low back pain.  No longer using opioids.  Getting by with Celebrex 200 mg twice daily.  Tries to keep up with regular back exercises learned from physical therapy.    Hypercholesterolemia on atorvastatin.  Tolerating medication without side effects.  Taking aspirin daily.  Denies exertional chest pain.  No TIA or CVA symptoms.  No  claudication.    She does notice some cold feet and burning sensation.  They turn red.    Review of Systems:    Please see above.  The rest of the review of systems are negative for all systems.    Patient Care Team:  Rishi Chew MD as PCP - General (Internal Medicine)  Mireya Howard MD (Dermatology)  Gera Lopez MD as Physician (Ophthalmology)  Mireya Felipe MD as Physician (Rheumatology)     Patient Active Problem List   Diagnosis     Lupus Anticoagulant     Esophageal Reflux     Lumbar Disc Degeneration     Rheumatoid arthritis (H)     Hypercholesterolemia     Family history of breast cancer     Lumbar radiculopathy     Chronic pain     Bleeding duodenal ulcer     Oral lichen planus     Chronic low back pain without sciatica     Complete tear of left rotator cuff     Past Medical History:   Diagnosis Date     Bleeding duodenal ulcer 2010     Chronic diarrhea 04/20/2017    Colonoscopy normal except inflammatory polyp or     Chronic left shoulder pain 9/26/2017     Chronic low back pain without sciatica 11/3/2017     Chronic pain      Chronic radicular low back pain     Pain clinic.  Nerve stimulator placement     Complete tear of left rotator cuff 4/3/2018     Duodenal ulcer     bleeding     Family history of breast cancer      GERD (gastroesophageal reflux disease)      H/O ulcer disease     secondary to medications     Herpes zoster 2010     History of transfusion      Hypercholesterolemia      LA (lupus anticoagulant) disorder (H)      Left hip pain 2015    bursitis, evaluated by orthopedics     MRSA (methicillin resistant staph aureus) culture positive     betweeen 1996 and 2000     Oral lichen planus 4/20/2017     Plantar fasciitis      Rheumatoid arthritis (H)     Followed by rheumatology, Dr. Felipe,      Screening     DEXA normal T score -0.9 2016, minimal change compared to previous DEXA     VRE (vancomycin resistant enterococcus) culture positive     between 1996 and 2000      Past  Surgical History:   Procedure Laterality Date     ABDOMINAL HERNIA REPAIR       APPENDECTOMY       BACK SURGERY       BREAST BIOPSY Left      COLONOSCOPY      Normal colonoscopy May 2017 except inflammatory polyp     ELBOW FRACTURE SURGERY  2014     HYSTERECTOMY      LUIS ANGEL with BSO     KNEE SURGERY Right 2007    Arthroscopic knee surgery     AK ARTHRODESIS ANT INTERBODY MIN DISCECTOMY,LUMBAR      Description: Lumbar Vertebral Fusion;  Recorded: 2011;  Comments: L5-S1 fusion     AK REMOVAL OF TONSILS,<11 Y/O      Description: Tonsillectomy;  Recorded: 2011;     AK STEREOTACT STIM SPINAL CORD      Description: Spinal Stereotaxis Stimulation Of Cord;  Recorded: 2011;  Comments: implanted 2007 with lead revision      ROTATOR CUFF REPAIR Right      ROTATOR CUFF REPAIR Left 2018     SKIN GRAFT  1996     TOTAL ABDOMINAL HYSTERECTOMY W/ BILATERAL SALPINGOOPHORECTOMY        Family History   Problem Relation Age of Onset     Breast cancer Mother 74     Stroke Mother      Lung cancer Father 70     Breast cancer Maternal Aunt 70     Liver cancer Brother 48      Social History     Socioeconomic History     Marital status:      Spouse name: Not on file     Number of children: Not on file     Years of education: Not on file     Highest education level: Not on file   Social Needs     Financial resource strain: Not on file     Food insecurity - worry: Not on file     Food insecurity - inability: Not on file     Transportation needs - medical: Not on file     Transportation needs - non-medical: Not on file   Occupational History     Not on file   Tobacco Use     Smoking status: Former Smoker     Last attempt to quit: 1970     Years since quittin.5     Smokeless tobacco: Never Used   Substance and Sexual Activity     Alcohol use: No     Drug use: No     Sexual activity: Not on file   Other Topics Concern     Not on file   Social History Narrative     Not on file      Current Outpatient  "Medications   Medication Sig Dispense Refill     aspirin 81 mg chewable tablet Chew 162 mg daily.       atorvastatin (LIPITOR) 20 MG tablet Take 1 tablet (20 mg total) by mouth daily. 90 tablet 3     celecoxib (CELEBREX) 200 MG capsule Take 1 capsule (200 mg total) by mouth 2 (two) times a day. 180 capsule 3     conjugated estrogens (PREMARIN) vaginal cream Inject two grams into the vagina 4 times per week 30 g 11     diclofenac sodium (VOLTAREN) 1 % Gel Apply 4 g topically. To affected joint 3 - 4 ties daily as needed.       GOLIMUMAB (SIMPONI ARIA IV) Infuse into a venous catheter every 2 (two) months.        hydroxychloroquine (PLAQUENIL) 200 mg tablet Take 400 mg by mouth daily.        multivitamin therapeutic (THERAGRAN) tablet Take 1 tablet by mouth daily.       nystatin (MYCOSTATIN) 100,000 unit/mL suspension        sulfaSALAzine (AZULFIDINE ENTAB) 500 MG EC tablet 1,500 mg 2 (two) times a day.        No current facility-administered medications for this visit.       Objective:   Vital Signs:   Visit Vitals  /70 (Patient Site: Left Arm, Patient Position: Sitting, Cuff Size: Adult Regular)   Pulse 87   Ht 5' 7\" (1.702 m)   Wt 171 lb (77.6 kg)   SpO2 95%   BMI 26.78 kg/m             PHYSICAL EXAM  EYES: Eyelids, conjunctiva, and sclera were normal. Pupils were normal. Cornea, iris, and lens were normal bilaterally.  HEAD, EARS, NOSE, MOUTH, AND THROAT: Head and face were normal. Nose appearance was normal and there was no discharge. Oropharynx was normal.  NECK: Neck appearance was normal. There were no neck masses and the thyroid was not enlarged and no nodules are felt.  No lymphadenopathy.  RESPIRATORY: Breathing pattern was normal and the chest moved symmetrically.  Percussion/auscultatory percussion was normal.  Lung sounds were normal and there were no rales or wheezes.  CARDIOVASCULAR: Heart rate and rhythm were normal.  S1 and S2 were normal and there were no extra sounds or murmurs. Peripheral " pulses in arms and legs were normal.  Jugular venous pressure was normal.  There was no peripheral edema.  No carotid bruits.  BREAST: No palpable masses or tenderness.  No axillary nodes.  GASTROINTESTINAL: The abdomen was normal in contour.  Bowel sounds were present.   Palpation detected no tenderness, mass, or enlarged organs.   PELVIC: Complete hysterectomy, deferred  MUSCULOSKELETAL: Skeletal configuration was normal and muscle mass was normal for age. Joint appearance was overall normal.  LYMPHATIC: There were no enlarged nodes.  SKIN/HAIR/NAILS: Skin color was normal.  Hair and nails were normal.There were no skin lesions.  NEUROLOGIC: The patient was alert and oriented to person, place, time, and circumstance. Speech was normal. Cranial nerves were normal. Motor strength was normal for age. The patient was normally coordinated.  Sensation intact.  PSYCHIATRIC:  Mood and affect were normal and the patient had normal recent and remote memory. The patient's judgment and insight were normal.    Assessment Results 11/20/2018   Activities of Daily Living No help needed   Instrumental Activities of Daily Living No help needed   Get Up and Go Score Less than 12 seconds   Mini Cog Total Score 5   Some recent data might be hidden     A Mini-Cog score of 0-2 suggests the possibility of dementia, score of 3-5 suggests no dementia    Identified Health Risks:     Patient's advanced directive was discussed and I am comfortable with the patient's wishes.

## 2021-06-22 NOTE — TELEPHONE ENCOUNTER
Refill Request  Did you contact pharmacy: No  Medication name:   Requested Prescriptions     Pending Prescriptions Disp Refills     conjugated estrogens (PREMARIN) vaginal cream 30 g 11     Sig: Inject two grams into the vagina 4 times per week     Who prescribed the medication: Dr Chew  Pharmacy Name and Location[facility] patient. Fax number [phone].  Is patient out of medication: soon  Patient notified refills processed in 72 hours:  yes  Okay to leave a detailed message: yes, please call patient when medication is sent.

## 2021-06-24 ENCOUNTER — OFFICE VISIT - HEALTHEAST (OUTPATIENT)
Dept: INTERNAL MEDICINE | Facility: CLINIC | Age: 71
End: 2021-06-24

## 2021-06-24 DIAGNOSIS — Z00.00 HEALTHCARE MAINTENANCE: ICD-10-CM

## 2021-06-24 DIAGNOSIS — R73.03 PREDIABETES: ICD-10-CM

## 2021-06-24 DIAGNOSIS — Z12.31 ENCOUNTER FOR SCREENING MAMMOGRAM FOR BREAST CANCER: ICD-10-CM

## 2021-06-24 DIAGNOSIS — Z20.822 ENCOUNTER FOR LABORATORY TESTING FOR COVID-19 VIRUS: ICD-10-CM

## 2021-06-24 DIAGNOSIS — M81.0 AGE-RELATED OSTEOPOROSIS WITHOUT CURRENT PATHOLOGICAL FRACTURE: ICD-10-CM

## 2021-06-24 DIAGNOSIS — N95.2 VAGINAL ATROPHY: ICD-10-CM

## 2021-06-24 DIAGNOSIS — M79.645 PAIN OF FINGER OF LEFT HAND: ICD-10-CM

## 2021-06-24 DIAGNOSIS — E78.2 MIXED HYPERLIPIDEMIA: ICD-10-CM

## 2021-06-24 DIAGNOSIS — M06.9 RHEUMATOID ARTHRITIS INVOLVING MULTIPLE SITES, UNSPECIFIED WHETHER RHEUMATOID FACTOR PRESENT (H): ICD-10-CM

## 2021-06-24 LAB
ALBUMIN SERPL-MCNC: 4.4 G/DL (ref 3.5–5)
ALP SERPL-CCNC: 49 U/L (ref 45–120)
ALT SERPL W P-5'-P-CCNC: 24 U/L (ref 0–45)
ANION GAP SERPL CALCULATED.3IONS-SCNC: 10 MMOL/L (ref 5–18)
AST SERPL W P-5'-P-CCNC: 33 U/L (ref 0–40)
BILIRUB SERPL-MCNC: 0.4 MG/DL (ref 0–1)
BUN SERPL-MCNC: 30 MG/DL (ref 8–28)
CALCIUM SERPL-MCNC: 9.9 MG/DL (ref 8.5–10.5)
CHLORIDE BLD-SCNC: 105 MMOL/L (ref 98–107)
CHOLEST SERPL-MCNC: 252 MG/DL
CO2 SERPL-SCNC: 25 MMOL/L (ref 22–31)
CREAT SERPL-MCNC: 0.92 MG/DL (ref 0.6–1.1)
ERYTHROCYTE [DISTWIDTH] IN BLOOD BY AUTOMATED COUNT: 12.6 % (ref 11–14.5)
FASTING STATUS PATIENT QL REPORTED: YES
GFR SERPL CREATININE-BSD FRML MDRD: 60 ML/MIN/1.73M2
GLUCOSE BLD-MCNC: 102 MG/DL (ref 70–125)
HBA1C MFR BLD: 5.7 %
HCT VFR BLD AUTO: 42.9 % (ref 35–47)
HDLC SERPL-MCNC: 89 MG/DL
HGB BLD-MCNC: 14 G/DL (ref 12–16)
LDLC SERPL CALC-MCNC: 147 MG/DL
MCH RBC QN AUTO: 32.5 PG (ref 27–34)
MCHC RBC AUTO-ENTMCNC: 32.6 G/DL (ref 32–36)
MCV RBC AUTO: 100 FL (ref 80–100)
PLATELET # BLD AUTO: 196 THOU/UL (ref 140–440)
PMV BLD AUTO: 10 FL (ref 7–10)
POTASSIUM BLD-SCNC: 4.6 MMOL/L (ref 3.5–5)
PROT SERPL-MCNC: 7.1 G/DL (ref 6–8)
RBC # BLD AUTO: 4.31 MILL/UL (ref 3.8–5.4)
SODIUM SERPL-SCNC: 140 MMOL/L (ref 136–145)
TRIGL SERPL-MCNC: 81 MG/DL
TSH SERPL DL<=0.005 MIU/L-ACNC: 1.69 UIU/ML (ref 0.3–5)
WBC: 3.6 THOU/UL (ref 4–11)

## 2021-06-24 RX ORDER — ESTRADIOL 0.1 MG/G
0.5 CREAM VAGINAL DAILY
Qty: 42.5 G | Refills: 1 | Status: SHIPPED | OUTPATIENT
Start: 2021-06-24 | End: 2022-04-26

## 2021-06-24 ASSESSMENT — MIFFLIN-ST. JEOR: SCORE: 1358.22

## 2021-06-26 NOTE — PROGRESS NOTES
Office Visit - [facility] annual wellness visit    Antonia Everett   71 y.o.  female    Date of visit: 6/24/2021  Physician: Sarah Beth Carvalho MD     Assessment and Plan   1. Encounter for laboratory testing for COVID-19 virus  She would like to know her Covid antibody level her test result given that she is on Biologics  - COVID-19 Roberto Carlos RBD Chantel and Reflex Titer; Future  - COVID-19 Roberto Carlos RBD Chantel and Reflex Titer    2. Mixed hyperlipidemia  She is tolerating statin very well  - Lipid [place] FASTING  - Comprehensive Metabolic Panel  - HM2(CBC w/o Differential)  - Thyroid Stimulating Hormone (TSH)    3. Encounter for screening mammogram for breast cancer  Recommend annual mammogram  - Mammo Screening Bilateral; Future    4. Age-related osteoporosis without current pathological fracture  Last bone density in 2016 was normal but it has been 5 years so should she should repeat this  - DXA Bone Density Scan; Future    5. Vaginal atrophy  She uses the estrogen cream primarily for the prevention of urinary tract infections.  She notices that most of the cream comes out in the morning when she wakes up.  And she also finds it very expensive.  I did tell her that it very at the cost very sometimes [id number] will try estradiol to see if it is cheaper and she could try just using it externally over her urethra and that means she can spread out the use of one tube over several months  - estradioL (ESTRACE) 0.01 % (0.1 mg/gram) vaginal cream; Insert 0.5 g into the vagina daily.  Dispense: 42.5 g; Refill: 1    6. Prediabetes    - Glycosylated Hemoglobin A1c    7. Pain of finger of left hand  Her knees do bother her with the arthritis and lower back.  She does use diclofenac over-the-counter, she finds CBD oil is actually more useful.  She can try diclofenac gel prescription strength and see if that helps.  Or if it is covered by her insurance.  She is using Celebrex I do recommend using it once a day.  She has a  history of a bleeding duodenal ulcer and I did tell her that it is a nonsteroidal so even though that ulcer was attributed to ibuprofen all nonsteroidals can cause duodenal bleeding.  - diclofenac sodium 3 % Gel; 4 g knees three times aday  and 2 g on hands or lebows three times a day  Dispense: 3 Tube; Refill: 3    8. Rheumatoid arthritis involving multiple sites, unspecified whether rheumatoid factor present (H)  In remission she has no extra-articular manifestations.  She is on a biologic.  She has no deformities she is on Plaquenil as well and going for her and by mouth annual eye exam  9. Healthcare maintenance  See problem list her cancer screening is up-to-date immunization is up-to-date she will get her bone density this year.                   Patient Profile   Social History     Social History Narrative    Lives at home with her  , gardening , bikes , swims 4-5 miles walk a day         Past Medical History   Past Medical History:   Diagnosis Date     Bleeding duodenal ulcer 2010     Chronic diarrhea 04/20/2017    Colonoscopy normal except inflammatory polyp or     Chronic left shoulder pain 9/26/2017     Chronic low back pain without sciatica 11/3/2017     Chronic pain      Chronic radicular low back pain     Pain clinic.  Nerve stimulator placement     Complete tear of left rotator cuff 4/3/2018     Duodenal ulcer     bleeding     Family history of breast cancer      GERD (gastroesophageal reflux disease)      H/O ulcer disease     secondary to medications     Herpes zoster 2010     History of transfusion      Hypercholesterolemia      LA (lupus anticoagulant) disorder (H)      Left hip pain 2015    bursitis, evaluated by orthopedics     Lichen sclerosus of female genitalia 6/16/2020     MRSA (methicillin resistant staph aureus) culture positive     betweeen 1996 and 2000     Oral lichen planus 4/20/2017     Plantar fasciitis      Raynaud's disease without gangrene 11/20/2018    Involving both  feet, describing burning sensation, redness and coolness to touch     Rheumatoid arthritis (H)     Followed by rheumatology, Dr. Felipe,      Screening     DEXA normal T score -0.9 2016, minimal change compared to previous DEXA     VRE (vancomycin resistant enterococcus) culture positive     between 1996 and 2000       Patient Active Problem List    Diagnosis Date Noted     Healthcare maintenance 06/24/2021     Overview Note:     Clon 2017 next ten years  Annual mammo   dexa ordered ( last normal 2016)        Lichen sclerosus of female genitalia 06/16/2020     Raynaud's disease without gangrene 11/20/2018     Overview Note:     Involving both feet, describing burning sensation, redness and coolness to touch       Complete tear of left rotator cuff 04/03/2018     Chronic low back pain without sciatica 11/03/2017     Oral lichen planus 04/20/2017     Rheumatoid arthritis (H)      Overview Note:     Followed by rheumatology, Dr. Felipe, discontinued Enbrel and increased dose of Arava February 2016       Hypercholesterolemia      Family history of breast cancer      Lumbar radiculopathy      Overview Note:     Pain clinic.  Nerve stimulator placement       Chronic pain      Lupus Anticoagulant      Overview Note:     Created by Conversion         Esophageal Reflux      Overview Note:     Created by Conversion         Lumbar Disc Degeneration      Overview Note:     Created by Conversion         Bleeding duodenal ulcer 01/01/2010           Past Surgical History  She has a past surgical history that includes pr arthrodesis ant interbody min discectomy,lumbar; pr stereotact stim spinal cord; pr removal of tonsils,<14 y/o; Total abdominal hysterectomy w/ bilateral salpingoophorectomy; Knee surgery (Right, 2007); Rotator cuff repair (Right); Appendectomy; Skin graft (1996); Colonoscopy; Elbow fracture surgery (2014); Hysterectomy; Abdominal hernia repair; Back surgery; Breast biopsy (Left); Rotator cuff repair (Left,  04/2018); and Esophagogastroduodenoscopy (N/A, 12/16/2018).     History of Present Illness   This 71 y.o. old very pleasant patient with history of rheumatoid arthritis, hyperlipidemia here for establish visit and annual wellness visit  She is in her usual state of health and is doing very well  Review of Systems: A comprehensive review of systems was negative except as noted.  no shortness of breath ,no  chest pain ,no  Falls, no urinary incontinence , no weight changes , sleep and mood have been good . Independent in ADLS and IADLS        Medications and Allergies   Current Outpatient Medications   Medication Sig Dispense Refill     atorvastatin (LIPITOR) 20 MG tablet Take 1 tablet (20 mg total) by mouth at bedtime. 90 tablet 3     celecoxib (CELEBREX) 200 MG capsule Take 1 capsule (200 mg total) by mouth 2 (two) times a day. 180 capsule 3     conjugated estrogens (PREMARIN) vaginal cream Insert 0.5 g into the vagina daily. 42.5 g 1     diclofenac sodium (VOLTAREN) 1 % Gel Apply 4 g topically. To affected joint 3 - 4 ties daily as needed.       GOLIMUMAB (SIMPONI ARIA IV) Infuse into a venous catheter every 2 (two) months.        hydroxychloroquine (PLAQUENIL) 200 mg tablet Take 400 mg by mouth daily.        Lactobacillus rhamnosus GG (CULTURELLE) 10-15 Billion cell capsule Take 1 capsule by mouth daily.       multivitamin therapeutic (THERAGRAN) tablet Take 1 tablet by mouth daily.       nystatin (MYCOSTATIN) 100,000 unit/mL suspension        sulfaSALAzine (AZULFIDINE ENTAB) 500 MG EC tablet 1,500 mg 2 (two) times a day.        diclofenac sodium 3 % Gel 4 g knees three times aday  and 2 g on hands or lebows three times a day 3 Tube 3     estradioL (ESTRACE) 0.01 % (0.1 mg/gram) vaginal cream Insert 0.5 g into the vagina daily. 42.5 g 1     No current facility-administered medications for this visit.      Allergies   Allergen Reactions     Codeine      nausea     Gabapentin      Fatigue       Pregabalin       "Fatigue          Family and Social History   Family History   Problem Relation Age of Onset     Breast cancer Mother 74     Stroke Mother      Lung cancer Father 70     Breast cancer Maternal Aunt 70     Liver cancer Brother 48        Social History     Tobacco Use     Smoking status: Former Smoker     Quit date: 1970     Years since quittin.1     Smokeless tobacco: Never Used   Substance Use Topics     Alcohol use: No     Drug use: No          Physical Exam   General Appearance:       BP 96/68 (Patient Site: Left Arm, Patient Position: Sitting, Cuff Size: Adult Regular)   Pulse 80   Ht 5' 7\" (1.702 m)   Wt 177 lb 9.6 oz (80.6 kg)   SpO2 98%   BMI 27.82 kg/m    .  NECK: Neck appearance was normal. There were no neck masses and the thyroid was not enlarged.  RESPIRATORY: Breathing pattern was normal and the chest moved symmetrically.  Percussion/auscultatory percussion was normal.  Lung sounds were normal and there were no abnormal sounds.  CARDIOVASCULAR: Heart rate and rhythm were normal.  S1 and S2 were normal and there were no extra sounds or murmurs. Peripheral pulses in arms and legs were normal.  Jugular venous pressure was normal.  There was no peripheral edema.  GASTROINTESTINAL: The abdomen was normal in contour.  Bowel sounds were present.  Percussion detected no organ enlargement or tenderness.  Palpation detected no tenderness, mass, or enlarged organs.   MUSCULOSKELETAL: Skeletal configuration was normal and muscle mass was normal for age. Joint appearance was overall normal.  LYMPHATIC: There were no enlarged nodes.  SKIN/HAIR/NAILS: Skin color was normal.  There were no skin lesions.  Hair and nails were normal.  NEUROLOGIC: The patient was alert and oriented to person, place, time, and circumstance. Speech was normal. Cranial nerves were normal. Motor strength was normal for age. The patient was normally coordinated.  PSYCHIATRIC:  Mood and affect were normal and the patient had normal " recent and remote memory. The patient's judgment and insight were normal.    BREASTS: NAD          Additional Information        Sarah Beth Carvalho MD  Internal Medicine  Contact me at [phone]    The following health maintenance schedule was reviewed with the patient and provided in printed form in the after visit summary:   Health Maintenance   Topic Date Due     MEDICARE ANNUAL WELLNESS VISIT  06/24/2022     FALL RISK ASSESSMENT  06/24/2022     MAMMOGRAM  08/18/2022     ADVANCE CARE PLANNING  06/16/2025     LIPID  06/24/2026     COLORECTAL CANCER SCREENING  04/27/2027     TD 18+ HE  09/30/2030     DEXA SCAN  07/27/2031     HEPATITIS C SCREENING  Completed     Pneumococcal Vaccine: 65+ Years  Completed     INFLUENZA VACCINE RULE BASED  Completed     ZOSTER VACCINES  Completed     COVID-19 Vaccine  Completed     Pneumococcal Vaccine: Pediatrics (0 to 5 Years) and At-Risk Patients (6 to 64 Years)  Aged Out     HEPATITIS B VACCINES  Aged Out         Patient Care Team:  Sarah Beth Carvalho MD as PCP - General (Internal Medicine)  Mireya Howard MD (Dermatology)  Gera Lopez MD as Physician (Ophthalmology)  Mireya Felipe MD as Physician (Rheumatology)  Rishi Chew MD as Assigned PCP             Family History   Problem Relation Age of Onset     Breast cancer Mother 74     Stroke Mother      Lung cancer Father 70     Breast cancer Maternal Aunt 70     Liver cancer Brother 48            Objective:         Assessment Results 6/24/2021   Activities of Daily Living No help needed   Instrumental Activities of Daily Living No help needed   Get Up and Go Score -   Mini Cog Total Score 5   Some recent data might be hidden     A Mini-Cog score of 0-2 suggests the possibility of dementia, score of 3-5 suggests no dementia    Identified Health Risks:     The patient reports that she drinks more than one alcoholic drink per day but denies binge or excessive drinking. She was counseled and given  information about possible harmful effects of excessive alcohol intake.  Patient's advanced directive was discussed and I am comfortable with the patient's wishes.

## 2021-06-26 NOTE — PATIENT INSTRUCTIONS - HE
Patient Education   Alcohol Use   Many people can enjoy a glass of wine or beer without any negative consequences to their health. According to the Centers for Disease Control and Prevention (CDC), having one or fewer drinks per day for women and two or fewer per day for men is considered moderate drinking.     When people drink more than moderately, it can become concerning. Excessive drinking is defined as consuming 15 drinks or more per week for men and 8 drinks or more per week for women. There are various health problems associated with excessive drinking, which include:    Damage to vital organs like the heart, brain, liver and pancreas    Harm to the digestive tract    Weaken the immune system    Higher risk for heart disease and cancer         Advance Directive  Patient s advance directive was discussed and I am comfortable with the patient s wishes.  Patient Education   Personalized Prevention Plan  You are due for the preventive services outlined below.  Your care team is available to assist you in scheduling these services.  If you have already completed any of these items, please share that information with your care team to update in your medical record.  There are no preventive care reminders to display for this patient.

## 2021-06-27 ENCOUNTER — COMMUNICATION - HEALTHEAST (OUTPATIENT)
Dept: SCHEDULING | Facility: CLINIC | Age: 71
End: 2021-06-27

## 2021-07-01 ENCOUNTER — COMMUNICATION - HEALTHEAST (OUTPATIENT)
Dept: INTERNAL MEDICINE | Facility: CLINIC | Age: 71
End: 2021-07-01

## 2021-07-03 NOTE — ADDENDUM NOTE
Addendum Note by Kat Gilbert PBT at 11/3/2017  2:23 PM     Author: Kat Gilbert PBT Service: -- Author Type:     Filed: 11/3/2017  2:23 PM Encounter Date: 11/3/2017 Status: Signed    : Kat Gilbert PBT ()    Addended by: KAT GILBERT on: 11/3/2017 02:23 PM        Modules accepted: Orders

## 2021-07-04 ENCOUNTER — HEALTH MAINTENANCE LETTER (OUTPATIENT)
Age: 71
End: 2021-07-04

## 2021-07-04 NOTE — TELEPHONE ENCOUNTER
Telephone Encounter by Megan Saldana at 7/1/2021  8:06 AM     Author: Megan Saldana Service: -- Author Type: --    Filed: 7/1/2021  8:08 AM Encounter Date: 6/29/2021 Status: Signed    : Megan Saldana       PRIOR AUTHORIZATION DENIED    Denial Rational: Medication is not covered when being prescribed for an off label use. Patient would also need to try and fail imiquimod.               Appeal Information: If provider would like to appeal please provide a letter of medical necessity and route back to the PA team.

## 2021-07-04 NOTE — TELEPHONE ENCOUNTER
Telephone Encounter by Sarah Beth Carvalho MD at 7/1/2021  8:46 AM     Author: Sarah Beth Carvalho MD Service: -- Author Type: Physician    Filed: 7/1/2021  8:47 AM Encounter Date: 6/29/2021 Status: Signed    : Sarah Beth Carvalho MD (Physician)       She is not using estrogen for atinc keratosis but atriphic vaginitis . I ave written that in the letter. It was ordered with the diagnosis code of atrophic vaginitis

## 2021-07-04 NOTE — TELEPHONE ENCOUNTER
Telephone Encounter by Megan Saldana at 7/1/2021  8:48 AM     Author: Megan Saldana Service: -- Author Type: --    Filed: 7/1/2021  8:52 AM Encounter Date: 6/29/2021 Status: Addendum    : Megan Saldana    Related Notes: Original Note by Megan Saldana filed at 7/1/2021  8:50 AM       This request was for diclofenac 3% gel which is only FDA approved for atinc keratosis.

## 2021-07-04 NOTE — TELEPHONE ENCOUNTER
Telephone Encounter by Sarah Beth Carvalho MD at 7/1/2021 11:17 AM     Author: Sarah Beth Carvalho MD Service: -- Author Type: Physician    Filed: 7/1/2021 11:18 AM Encounter Date: 6/29/2021 Status: Signed    : Sarah Beth Carvalho MD (Physician)       She should run this by dermatology otherwise she can try imiquimod asmentioned and I can prescribe that

## 2021-07-04 NOTE — TELEPHONE ENCOUNTER
Telephone Encounter by Megan Saldana at 7/1/2021  8:05 AM     Author: Megan Saldana Service: -- Author Type: --    Filed: 7/1/2021  8:06 AM Encounter Date: 6/29/2021 Status: Signed    : Megan Saldana       PRIOR AUTHORIZATION DENIED    Denial Rational: Patient needs to try and fail estradiol vaginal tablets AND premarin cream          Appeal Information: If provider would like to appeal please provide a letter of medical necessity stating why formulary alternatives would not be clinically appropriate for the patient and route back to the PA team.

## 2021-07-05 PROBLEM — Z00.00 HEALTHCARE MAINTENANCE: Status: ACTIVE | Noted: 2021-06-24

## 2021-07-06 VITALS
SYSTOLIC BLOOD PRESSURE: 96 MMHG | DIASTOLIC BLOOD PRESSURE: 68 MMHG | HEIGHT: 67 IN | HEART RATE: 80 BPM | OXYGEN SATURATION: 98 % | WEIGHT: 177.6 LBS | BODY MASS INDEX: 27.88 KG/M2

## 2021-07-07 NOTE — TELEPHONE ENCOUNTER
Central PA team  [phone]  Pool: [facility] PA MED ([id number])          PA has been initiated.       PA form completed and faxed insurance via Cover My Meds     Key:  [id number] - [id number]     Medication:  Diclofenac Sodium 3% gel        Insurance:  [employer]        Response will be received via fax and may take up to 5-10 business days depending on plan

## 2021-07-07 NOTE — TELEPHONE ENCOUNTER
Central PA team  [phone]  Pool: HE PA MED ([id number])          PA has been initiated.       PA form completed and faxed insurance via Cover My Meds     Key:  [id number]     Medication:  Estradiol 0.1MG/GM cream    Insurance:  [employer]        Response will be received via fax and may take up to 5-10 business days depending on plan

## 2021-07-07 NOTE — TELEPHONE ENCOUNTER
Prior authorization request received for both Estradiol 0.1 mg/gm cream and diclofenac sodium 3% gel.  Routing to PA team.

## 2021-07-12 NOTE — TELEPHONE ENCOUNTER
Prescription has been set up for Dr. Chew to review per message below.  Sandee SEVILLA, CARLA/CMT....................4:37 PM     Will dilate 10 mm to 12 mm next time and hopefully be able to traverse stricture and complete colonoscopy.

## 2021-07-22 NOTE — LETTER
Letter by Sarah Beth Carvalho MD at      Author: Sarah Beth Carvalho MD Service: -- Author Type: --    Filed:  Encounter Date: 6/29/2021 Status: (Other)         July 1, 2021     Patient: Antonia Everett   YOB: 1950   Date of Visit: 6/29/2021       To Whom It May Concern:    It is my medical opinion that it is medically necessary for  Antonia Everett to be treated with estradiol vaginal cream . She has atrophic vaginitis which is an approved use for estrogen . Imiquimod is not used for that indication . She is not using estrogen for actinic keratosis .  If you have any questions or concerns, please don't hesitate to call.    Sincerely,        Electronically signed by Sarah Beth Carvalho MD

## 2021-08-05 DIAGNOSIS — E78.00 HYPERCHOLESTEROLEMIA: ICD-10-CM

## 2021-08-08 RX ORDER — ATORVASTATIN CALCIUM 20 MG/1
TABLET, FILM COATED ORAL
Qty: 90 TABLET | Refills: 3 | Status: SHIPPED | OUTPATIENT
Start: 2021-08-08 | End: 2023-02-16

## 2021-08-09 NOTE — TELEPHONE ENCOUNTER
" Disp Refills Start End CLARA   atorvastatin (LIPITOR) 20 MG tablet 90 tablet 3 6/20/2020  No   Sig - Route: Take 1 tablet (20 mg total) by mouth at bedtime. - Oral   Sent to pharmacy as: atorvastatin 20 mg tablet (LIPITOR)   E-Prescribing Status: Receipt confirmed by pharmacy (6/20/2020  6:41 PM CDT)     Last Written Prescription Date:  06/20/2020  Last Fill Quantity: 90,  # refills: 3   Last office visit provider:  06/24/2021 with Dr Carvalho.     Requested Prescriptions   Pending Prescriptions Disp Refills     atorvastatin (LIPITOR) 20 MG tablet [Pharmacy Med Name: ATORVASTATIN TABS 20MG] 90 tablet 3     Sig: TAKE 1 TABLET AT BEDTIME       Statins Protocol Failed - 8/5/2021  7:55 AM        Failed - LDL on file in past 12 months     Recent Labs   Lab Test 06/24/21  0943   *             Failed - Recent (12 mo) or future (30 days) visit within the authorizing provider's specialty     Patient has had an office visit with the authorizing provider or a provider within the authorizing providers department within the previous 12 mos or has a future within next 30 days. See \"Patient Info\" tab in inbasket, or \"Choose Columns\" in Meds & Orders section of the refill encounter.              Passed - No abnormal creatine kinase in past 12 months     No lab results found.             Passed - Medication is active on med list        Passed - Patient is age 18 or older        Passed - No active pregnancy on record        Passed - No positive pregnancy test in past 12 months             Analisa Hart 08/08/21 10:14 PM  " Yes

## 2021-08-16 ENCOUNTER — TRANSFERRED RECORDS (OUTPATIENT)
Dept: HEALTH INFORMATION MANAGEMENT | Facility: CLINIC | Age: 71
End: 2021-08-16

## 2021-08-23 ENCOUNTER — ANCILLARY PROCEDURE (OUTPATIENT)
Dept: MAMMOGRAPHY | Facility: CLINIC | Age: 71
End: 2021-08-23
Attending: INTERNAL MEDICINE
Payer: MEDICARE

## 2021-08-23 ENCOUNTER — ANCILLARY PROCEDURE (OUTPATIENT)
Dept: BONE DENSITY | Facility: CLINIC | Age: 71
End: 2021-08-23
Attending: INTERNAL MEDICINE
Payer: MEDICARE

## 2021-08-23 DIAGNOSIS — M81.0 AGE-RELATED OSTEOPOROSIS WITHOUT CURRENT PATHOLOGICAL FRACTURE: ICD-10-CM

## 2021-08-23 DIAGNOSIS — Z12.31 ENCOUNTER FOR SCREENING MAMMOGRAM FOR BREAST CANCER: ICD-10-CM

## 2021-08-23 PROCEDURE — 77080 DXA BONE DENSITY AXIAL: CPT | Performed by: INTERNAL MEDICINE

## 2021-08-23 PROCEDURE — 77067 SCR MAMMO BI INCL CAD: CPT | Mod: TC | Performed by: INTERNAL MEDICINE

## 2021-08-23 PROCEDURE — 77063 BREAST TOMOSYNTHESIS BI: CPT | Mod: TC | Performed by: INTERNAL MEDICINE

## 2021-08-23 PROCEDURE — 77081 DXA BONE DENSITY APPENDICULR: CPT | Mod: 59 | Performed by: INTERNAL MEDICINE

## 2021-09-22 ENCOUNTER — TRANSFERRED RECORDS (OUTPATIENT)
Dept: HEALTH INFORMATION MANAGEMENT | Facility: CLINIC | Age: 71
End: 2021-09-22

## 2021-10-24 ENCOUNTER — HEALTH MAINTENANCE LETTER (OUTPATIENT)
Age: 71
End: 2021-10-24

## 2021-12-20 ENCOUNTER — TRANSFERRED RECORDS (OUTPATIENT)
Dept: HEALTH INFORMATION MANAGEMENT | Facility: CLINIC | Age: 71
End: 2021-12-20
Payer: MEDICARE

## 2022-04-25 ENCOUNTER — TELEPHONE (OUTPATIENT)
Dept: INTERNAL MEDICINE | Facility: CLINIC | Age: 72
End: 2022-04-25
Payer: MEDICARE

## 2022-04-25 DIAGNOSIS — N95.2 VAGINAL ATROPHY: ICD-10-CM

## 2022-04-25 NOTE — TELEPHONE ENCOUNTER
Reason for Call:  Other call back    Detailed comments: Pt asking for a Rx for Estradiol 0.01% -     [facility]:  [facility]    Phone Number Patient can be reached at: Cell number on file:    Telephone Information:   Mobile [phone]       Best Time: anytime    Can we leave a detailed message on this number? YES    Call taken on 4/25/2022 at 10:16 AM by Tila Tsang

## 2022-04-26 RX ORDER — ESTRADIOL 0.1 MG/G
0.5 CREAM VAGINAL DAILY
Qty: 42.5 G | Refills: 11 | Status: SHIPPED | OUTPATIENT
Start: 2022-04-26 | End: 2023-02-16

## 2022-04-27 DIAGNOSIS — G89.29 CHRONIC MIDLINE LOW BACK PAIN WITHOUT SCIATICA: Primary | ICD-10-CM

## 2022-04-27 DIAGNOSIS — M54.50 CHRONIC MIDLINE LOW BACK PAIN WITHOUT SCIATICA: Primary | ICD-10-CM

## 2022-04-27 RX ORDER — CELECOXIB 200 MG/1
200 CAPSULE ORAL 2 TIMES DAILY
Qty: 180 CAPSULE | Refills: 3 | Status: SHIPPED | OUTPATIENT
Start: 2022-04-27 | End: 2023-03-30

## 2022-04-27 NOTE — TELEPHONE ENCOUNTER
Refill request received from [facility] for Celebrex 200 mg    Pending Prescriptions:                       Disp   Refills    celecoxib (CELEBREX) 200 MG capsule       180 ca*3            Sig: Take 1 capsule (200 mg) by mouth 2 times daily       [id number] - [place], [place] - [address]

## 2022-07-31 ENCOUNTER — HEALTH MAINTENANCE LETTER (OUTPATIENT)
Age: 72
End: 2022-07-31

## 2022-08-22 ENCOUNTER — TRANSFERRED RECORDS (OUTPATIENT)
Dept: HEALTH INFORMATION MANAGEMENT | Facility: CLINIC | Age: 72
End: 2022-08-22

## 2022-08-25 ENCOUNTER — ANCILLARY PROCEDURE (OUTPATIENT)
Dept: MAMMOGRAPHY | Facility: CLINIC | Age: 72
End: 2022-08-25
Attending: INTERNAL MEDICINE
Payer: MEDICARE

## 2022-08-25 DIAGNOSIS — Z12.31 VISIT FOR SCREENING MAMMOGRAM: ICD-10-CM

## 2022-08-25 PROCEDURE — 77067 SCR MAMMO BI INCL CAD: CPT | Mod: TC | Performed by: RADIOLOGY

## 2022-08-25 PROCEDURE — 77063 BREAST TOMOSYNTHESIS BI: CPT | Mod: TC | Performed by: RADIOLOGY

## 2022-09-13 ENCOUNTER — OFFICE VISIT (OUTPATIENT)
Dept: INTERNAL MEDICINE | Facility: CLINIC | Age: 72
End: 2022-09-13
Payer: MEDICARE

## 2022-09-13 VITALS
HEIGHT: 68 IN | HEART RATE: 80 BPM | WEIGHT: 173 LBS | OXYGEN SATURATION: 98 % | SYSTOLIC BLOOD PRESSURE: 98 MMHG | DIASTOLIC BLOOD PRESSURE: 70 MMHG | BODY MASS INDEX: 26.22 KG/M2

## 2022-09-13 DIAGNOSIS — R73.03 PREDIABETES: ICD-10-CM

## 2022-09-13 DIAGNOSIS — I83.93 VARICOSE VEINS OF BOTH LOWER EXTREMITIES WITHOUT ULCER OR INFLAMMATION: ICD-10-CM

## 2022-09-13 DIAGNOSIS — E78.2 MIXED HYPERLIPIDEMIA: ICD-10-CM

## 2022-09-13 DIAGNOSIS — M54.50 CHRONIC MIDLINE LOW BACK PAIN WITHOUT SCIATICA: ICD-10-CM

## 2022-09-13 DIAGNOSIS — M06.00 RHEUMATOID ARTHRITIS WITH NEGATIVE RHEUMATOID FACTOR, INVOLVING UNSPECIFIED SITE (H): ICD-10-CM

## 2022-09-13 DIAGNOSIS — G89.29 CHRONIC MIDLINE LOW BACK PAIN WITHOUT SCIATICA: ICD-10-CM

## 2022-09-13 DIAGNOSIS — E55.9 VITAMIN D DEFICIENCY: ICD-10-CM

## 2022-09-13 DIAGNOSIS — Z00.00 HEALTHCARE MAINTENANCE: Primary | ICD-10-CM

## 2022-09-13 LAB
CHOLEST SERPL-MCNC: 276 MG/DL
DEPRECATED CALCIDIOL+CALCIFEROL SERPL-MC: 34 UG/L (ref 20–75)
HBA1C MFR BLD: 5.8 % (ref 0–5.6)
HDLC SERPL-MCNC: 104 MG/DL
LDLC SERPL CALC-MCNC: 158 MG/DL
NONHDLC SERPL-MCNC: 172 MG/DL
TRIGL SERPL-MCNC: 70 MG/DL
TSH SERPL DL<=0.005 MIU/L-ACNC: 2.23 UIU/ML (ref 0.3–4.2)

## 2022-09-13 PROCEDURE — 84443 ASSAY THYROID STIM HORMONE: CPT | Performed by: INTERNAL MEDICINE

## 2022-09-13 PROCEDURE — 80061 LIPID PANEL: CPT | Performed by: INTERNAL MEDICINE

## 2022-09-13 PROCEDURE — 90662 IIV NO PRSV INCREASED AG IM: CPT | Performed by: INTERNAL MEDICINE

## 2022-09-13 PROCEDURE — 82306 VITAMIN D 25 HYDROXY: CPT | Performed by: INTERNAL MEDICINE

## 2022-09-13 PROCEDURE — 99214 OFFICE O/P EST MOD 30 MIN: CPT | Mod: 25 | Performed by: INTERNAL MEDICINE

## 2022-09-13 PROCEDURE — 83036 HEMOGLOBIN GLYCOSYLATED A1C: CPT | Performed by: INTERNAL MEDICINE

## 2022-09-13 PROCEDURE — G0008 ADMIN INFLUENZA VIRUS VAC: HCPCS | Performed by: INTERNAL MEDICINE

## 2022-09-13 PROCEDURE — G0439 PPPS, SUBSEQ VISIT: HCPCS | Performed by: INTERNAL MEDICINE

## 2022-09-13 PROCEDURE — 36415 COLL VENOUS BLD VENIPUNCTURE: CPT | Performed by: INTERNAL MEDICINE

## 2022-09-13 ASSESSMENT — ACTIVITIES OF DAILY LIVING (ADL): CURRENT_FUNCTION: NO ASSISTANCE NEEDED

## 2022-09-13 ASSESSMENT — ENCOUNTER SYMPTOMS
FEVER: 0
SHORTNESS OF BREATH: 0
NAUSEA: 0
ARTHRALGIAS: 1
PARESTHESIAS: 0
DYSURIA: 0
CONSTIPATION: 0
MYALGIAS: 0
JOINT SWELLING: 1
DIARRHEA: 0
DIZZINESS: 0
BREAST MASS: 0
SORE THROAT: 0
PALPITATIONS: 0
CHILLS: 0
NERVOUS/ANXIOUS: 0
HEMATOCHEZIA: 0
HEMATURIA: 0
ABDOMINAL PAIN: 0
FREQUENCY: 0
HEADACHES: 0
WEAKNESS: 0
HEARTBURN: 0
COUGH: 0
EYE PAIN: 0

## 2022-09-13 NOTE — PROGRESS NOTES
"SUBJECTIVE:   Antonia Everett is a 72 year old very pleasant female who presents for Preventive Visit.    She has no new complaints.  Except for some pain and phlebitic changes she had in her legs a few months ago which have resolved resolved.  Patient has been advised of split billing requirements and indicates understanding: Yes  Are you in the first 12 months of your Medicare coverage?  No    Healthy Habits:     In general, how would you rate your overall health?  Good    Frequency of exercise:  6-7 days/week    Duration of exercise:  45-60 minutes    Do you usually eat at least 4 servings of fruit and vegetables a day, include whole grains    & fiber and avoid regularly eating high fat or \"junk\" foods?  Yes    Taking medications regularly:  Yes    Ability to successfully perform activities of daily living:  No assistance needed    Home Safety:  No safety concerns identified    Hearing Impairment:  No hearing concerns    In the past 6 months, have you been bothered by leaking of urine?  No    In general, how would you rate your overall mental or emotional health?  Good      PHQ-2 Total Score: 0    Additional concerns today:  Yes    Do you feel safe in your environment? Yes    Have you ever done Advance Care Planning? (For example, a Health Directive, POLST, or a discussion with a medical provider or your loved ones about your wishes): Yes, advance care planning is on file.       Fall risk  Fallen 2 or more times in the past year?: No  Any fall with injury in the past year?: No    Cognitive Screening   1) Repeat 3 items (Leader, Season, Table)    2) Clock draw: NORMAL  3) 3 item recall: Recalls 3 objects  Results: 3 items recalled: COGNITIVE IMPAIRMENT LESS LIKELY    Mini-CogTM Copyright MINERVA Sheriff. Licensed by the author for use in [facility]; reprinted with permission ([email or web address]). All rights reserved.          Reviewed and updated as needed this visit by clinical staff    Allergies  Meds       "          Reviewed and updated as needed this visit by Provider                   Social History     Tobacco Use     Smoking status: Former Smoker     Quit date: 1970     Years since quittin.3     Smokeless tobacco: Never Used   Substance Use Topics     Alcohol use: No         Alcohol Use 2022   Prescreen: >3 drinks/day or >7 drinks/week? No               Current providers sharing in care for this patient include:   Patient Care Team:  Sarah Beth Carvalho MD as PCP - General (Internal Medicine)  Sarah Beth Carvalho MD as Assigned PCP    The following health maintenance items are reviewed in Epic and correct as of today:  Health Maintenance Due   Topic Date Due     ANNUAL REVIEW OF  ORDERS  Never done     LUNG CANCER SCREENING  Never done     MEDICARE ANNUAL WELLNESS VISIT  2022     INFLUENZA VACCINE (1) 2022     COVID-19 Vaccine (5 - Booster for Moderna series) 09/10/2022     .pob  Current Outpatient Medications   Medication     atorvastatin (LIPITOR) 20 MG tablet     celecoxib (CELEBREX) 200 MG capsule     diclofenac sodium 3 % Gel     estradiol (ESTRACE) 0.1 MG/GM vaginal cream     GOLIMUMAB (SIMPONI ARIA IV)     hydroxychloroquine (PLAQUENIL) 200 mg tablet     Lactobacillus rhamnosus GG (CULTURELLE) 10-15 Billion cell capsule     multivitamin therapeutic (THERAGRAN) tablet     nystatin (MYCOSTATIN) 100,000 unit/mL suspension     sulfaSALAzine (AZULFIDINE ENTAB) 500 MG EC tablet     No current facility-administered medications for this visit.               Review of Systems   Constitutional: Negative for chills and fever.   HENT: Negative for congestion, ear pain, hearing loss and sore throat.    Eyes: Negative for pain and visual disturbance.   Respiratory: Negative for cough and shortness of breath.    Cardiovascular: Negative for chest pain, palpitations and peripheral edema.   Gastrointestinal: Negative for abdominal pain, constipation, diarrhea, heartburn, hematochezia and nausea.  "  Breasts:  Negative for tenderness, breast mass and discharge.   Genitourinary: Negative for dysuria, frequency, genital sores, hematuria, pelvic pain, urgency, vaginal bleeding and vaginal discharge.   Musculoskeletal: Positive for arthralgias and joint swelling. Negative for myalgias.   Skin: Negative for rash.   Neurological: Negative for dizziness, weakness, headaches and paresthesias.   Psychiatric/Behavioral: Negative for mood changes. The patient is not nervous/anxious.          OBJECTIVE:   BP 98/70 (BP Location: Left arm, Patient Position: Sitting, Cuff Size: Adult Regular)   Pulse 80   Ht 1.715 m (5' 7.5\")   Wt 78.5 kg (173 lb)   SpO2 98%   BMI 26.70 kg/m   Estimated body mass index is 26.7 kg/m  as calculated from the following:    Height as of this encounter: 1.715 m (5' 7.5\").    Weight as of this encounter: 78.5 kg (173 lb).  No orthostatic changes were noted  Physical Exam         GENERAL: healthy, alert and no distress  EYES: Eyes grossly normal to inspection, PERRL and conjunctivae and sclerae normal  HENT: ear canals and TM's normal, nose and mouth without ulcers or lesions  NECK: no adenopathy, no asymmetry, masses, or scars and thyroid normal to palpation  RESP: lungs clear to auscultation - no rales, rhonchi or wheezes  BREAST: normal without masses, tenderness or nipple discharge and no palpable axillary masses or adenopathy  CV: regular rate and rhythm, normal S1 S2, no S3 or S4, no murmur, click or rub, no peripheral edema and peripheral pulses strong  ABDOMEN: soft, nontender, no hepatosplenomegaly, no masses and bowel sounds normal  MS: no gross musculoskeletal defects noted, no edema  SKIN: no suspicious lesions or rashes  NEURO: Normal strength and tone, mentation intact and speech normal  PSYCH: mentation appears normal, affect normal/bright  Romberg's negative.  Heel lift normal      ASSESSMENT / PLAN:   (Z00.00) Healthcare maintenance  (primary encounter diagnosis)  Comment: " "  Plan: Colon   2017 next ten years  Annual mammo   dexa 2021  Immunization complete she will get the flu shot today and the COVID variant booster when available.    (M54.50,  G89.29) Chronic midline low back pain without sciatica  Comment:   Plan:     (M06.00) Rheumatoid arthritis with negative rheumatoid factor, involving unspecified site (H)  Comment: He is on triple therapy Simponi, Plaquenil, sulfasalazine.  [facility]  We are doing her blood counts and liver tests.  Kidney tests.  Plan:     (I83.93) Varicose veins of both lower extremities without ulcer or inflammation  Comment: Very minimal changes with no edema her calfs are supple.  At this stage no intervention needs to be done leg elevation and compression stockings can be continued  Plan:     (R73.03) Prediabetes  Comment:   Plan: TSH, Hemoglobin A1c            (E78.2) Mixed hyperlipidemia  Comment:   Plan: Lipid panel reflex to direct LDL Fasting            (E55.9) Vitamin D deficiency  Comment:   Plan: Vitamin D Deficiency              Pressure is a bit low but she has no symptoms she can check it at home she does have a blood pressure kit.  On recheck actually systolic was 120.    COUNSELING:  Reviewed preventive health counseling, as reflected in patient instructions    Estimated body mass index is 27.82 kg/m  as calculated from the following:    Height as of 6/24/21: 1.702 m (5' 7\").    Weight as of 6/24/21: 80.6 kg (177 lb 9.6 oz).        She reports that she quit smoking about 52 years ago. She has never used smokeless tobacco.      Appropriate preventive services were discussed with this patient, including applicable screening as appropriate for cardiovascular disease, diabetes, osteopenia/osteoporosis, and glaucoma.  As appropriate for age/gender, discussed screening for colorectal cancer, prostate cancer, breast cancer, and cervical cancer. Checklist reviewing preventive services available has been given to the " patient.    Reviewed patients plan of care and provided an AVS. The Basic Care Plan (routine screening as documented in Health Maintenance) for Antonia meets the Care Plan requirement. This Care Plan has been established and reviewed with the Patient.    Counseling Resources:  ATP IV Guidelines  Pooled Cohorts Equation Calculator  Breast Cancer Risk Calculator  Breast Cancer: Medication to Reduce Risk  FRAX Risk Assessment  ICSI Preventive Guidelines  Dietary Guidelines for Americans, 2010  [employer]'s MyPlate  ASA Prophylaxis  Lung CA Screening    Sarah Beth Carvalho MD  [facility]    Identified Health Risks:

## 2022-09-23 ENCOUNTER — OFFICE VISIT (OUTPATIENT)
Dept: URGENT CARE | Facility: URGENT CARE | Age: 72
End: 2022-09-23
Payer: MEDICARE

## 2022-09-23 VITALS
OXYGEN SATURATION: 97 % | HEART RATE: 65 BPM | RESPIRATION RATE: 16 BRPM | SYSTOLIC BLOOD PRESSURE: 120 MMHG | TEMPERATURE: 96.9 F | DIASTOLIC BLOOD PRESSURE: 68 MMHG

## 2022-09-23 DIAGNOSIS — R30.0 DYSURIA: Primary | ICD-10-CM

## 2022-09-23 LAB
ALBUMIN UR-MCNC: NEGATIVE MG/DL
APPEARANCE UR: CLEAR
BILIRUB UR QL STRIP: NEGATIVE
CLUE CELLS: ABNORMAL
COLOR UR AUTO: YELLOW
GLUCOSE UR STRIP-MCNC: NEGATIVE MG/DL
HGB UR QL STRIP: NEGATIVE
KETONES UR STRIP-MCNC: NEGATIVE MG/DL
LEUKOCYTE ESTERASE UR QL STRIP: NEGATIVE
NITRATE UR QL: NEGATIVE
PH UR STRIP: 5.5 [PH] (ref 5–7)
SP GR UR STRIP: <=1.005 (ref 1–1.03)
TRICHOMONAS, WET PREP: ABNORMAL
UROBILINOGEN UR STRIP-ACNC: 0.2 E.U./DL
WBC'S/HIGH POWER FIELD, WET PREP: ABNORMAL
YEAST, WET PREP: ABNORMAL

## 2022-09-23 PROCEDURE — 99213 OFFICE O/P EST LOW 20 MIN: CPT | Performed by: PHYSICIAN ASSISTANT

## 2022-09-23 PROCEDURE — 87210 SMEAR WET MOUNT SALINE/INK: CPT | Performed by: PHYSICIAN ASSISTANT

## 2022-09-23 PROCEDURE — 87086 URINE CULTURE/COLONY COUNT: CPT | Performed by: PHYSICIAN ASSISTANT

## 2022-09-23 PROCEDURE — 81003 URINALYSIS AUTO W/O SCOPE: CPT

## 2022-09-23 NOTE — PROGRESS NOTES
Assessment & Plan     1. Dysuria  UA is completely clear today, urine culture pending  Wet prep negative  Discussed with patient pushing fluids while UC is processing  If culture is +, will call patient as she will need treatment with ABx  If culture negative, and sxs continue follow-up with PCP  Back to clinic / ER if having fever, chills, flank pain, hematuria, severe abd pain, vomiting etc  - UA Macro with Reflex to Micro and Culture - lab collect; Future  - UA Macro with Reflex to Micro and Culture - lab collect  - Wet prep - Clinic Collect        Return in about 3 days (around 9/26/2022), or if symptoms worsen or fail to improve.    Diagnosis and treatment plan was reviewed with patient and/or family.   We went over any labs or imaging. Discussed worsening symptoms or little to no relief despite treatment plan to follow-up with PCP or return to clinic.  Patient verbalizes understanding. All questions were addressed and answered.     Ethel Bennett PA-C  [facility] [place]    CHIEF COMPLAINT:   Chief Complaint   Patient presents with     Dysuria     5 days, abdominal pressure/bloating, urgency, frequency     Subjective     Erika is a 72 year old female who presents to clinic today for evaluation of dysuria, suprapubic pressure, urgency and frequency. Symptoms started 5 days ago. Denies having fever, chills, flank pain, nausea, vomiting, hematuria or weakness.  Vaginal discharge, itching or pain are not present.     Past Medical History:   Diagnosis Date     Bleeding duodenal ulcer 2010     Chronic diarrhea 04/20/2017    Colonoscopy normal except inflammatory polyp or     Chronic left shoulder pain 9/26/2017     Chronic low back pain without sciatica 11/3/2017     Chronic pain      Chronic radicular low back pain     Pain clinic.  Nerve stimulator placement     Complete tear of left rotator cuff 4/3/2018     Duodenal ulcer     bleeding     Family history of breast cancer      GERD  (gastroesophageal reflux disease)      H/O ulcer disease     secondary to medications     Herpes zoster 2010     History of transfusion      Hypercholesterolemia      LA (lupus anticoagulant) disorder (H)      Left hip pain 2015    bursitis, evaluated by orthopedics     Lichen sclerosus of female genitalia 6/16/2020     MRSA (methicillin resistant staph aureus) culture positive     betweeen 1996 and 2000     Oral lichen planus 4/20/2017     Plantar fasciitis      Raynaud's disease without gangrene 11/20/2018    Involving both feet, describing burning sensation, redness and coolness to touch     Rheumatoid arthritis (H)     Followed by rheumatology, Dr. Felipe,      Screening     DEXA normal T score -0.9 2016, minimal change compared to previous DEXA     VRE (vancomycin resistant enterococcus) culture positive     between 1996 and 2000     Past Surgical History:   Procedure Laterality Date     ABDOMINAL HERNIA REPAIR       APPENDECTOMY       ARTHROSCOPY SHOULDER ROTATOR CUFF REPAIR Right      ARTHROSCOPY SHOULDER ROTATOR CUFF REPAIR Left 04/2018     BACK SURGERY       BIOPSY BREAST Left      COLONOSCOPY      Normal colonoscopy May 2017 except inflammatory polyp     ESOPHAGOSCOPY, GASTROSCOPY, DUODENOSCOPY (EGD), COMBINED N/A 12/16/2018    Procedure: ESOPHAGOGASTRODUODENOSCOPY (EGD) with biopsy and foreign body removal;  Surgeon: Raymond Triana MD;  Location: [facility];  Service: Gastroenterology     HC REMOVAL OF TONSILS,<11 Y/O      Description: Tonsillectomy;  Recorded: 09/30/2011;     HYSTERECTOMY      LUIS ANGEL with BSO     HYSTERECTOMY TOTAL ABDOMINAL, BILATERAL SALPINGO-OOPHORECTOMY, COMBINED       KNEE SURGERY Right 2007    Arthroscopic knee surgery     OPEN REDUCTION INTERNAL FIXATION ELBOW  2014     CO ARTHRODESIS ANT INTERBODY MIN DISCECTOMY,LUMBAR      Description: Lumbar Vertebral Fusion;  Recorded: 09/30/2011;  Comments: L5-S1 fusion     CO STEREOTACT STIM SPINAL CORD      Description: Spinal  Stereotaxis Stimulation Of Cord;  Recorded: 2011;  Comments: implanted 2007 with lead revision      SKIN GRAFT       Social History     Tobacco Use     Smoking status: Former Smoker     Quit date: 1970     Years since quittin.4     Smokeless tobacco: Never Used   Substance Use Topics     Alcohol use: No     Current Outpatient Medications   Medication     atorvastatin (LIPITOR) 20 MG tablet     celecoxib (CELEBREX) 200 MG capsule     diclofenac sodium 3 % Gel     estradiol (ESTRACE) 0.1 MG/GM vaginal cream     GOLIMUMAB (SIMPONI ARIA IV)     hydroxychloroquine (PLAQUENIL) 200 mg tablet     Lactobacillus rhamnosus GG (CULTURELLE) 10-15 Billion cell capsule     multivitamin therapeutic (THERAGRAN) tablet     nystatin (MYCOSTATIN) 100,000 unit/mL suspension     sulfaSALAzine (AZULFIDINE ENTAB) 500 MG EC tablet     No current facility-administered medications for this visit.     Allergies   Allergen Reactions     Codeine Unknown     nausea     Gabapentin Unknown     Fatigue       Pregabalin Unknown     Fatigue         10 point ROS of systems were all negative except for pertinent positives noted in my HPI.      Exam:   /68 (BP Location: Right arm, Patient Position: Sitting)   Pulse 65   Temp 96.9  F (36.1  C) (Tympanic)   Resp 16   SpO2 97%   Constitutional: healthy, alert and no distress  ENT: MMM  Cardiovascular: RRR  Respiratory: CTA bilaterally, no rhonchi or rales  Gastrointestinal: soft and nontender  Back: No CVA tenderness B/L  Skin: no rashes      Results for orders placed or performed in visit on 22   UA Macro with Reflex to Micro and Culture - lab collect     Status: Normal    Specimen: Urine, Clean Catch   Result Value Ref Range    Color Urine Yellow Colorless, Straw, Light Yellow, Yellow    Appearance Urine Clear Clear    Glucose Urine Negative Negative mg/dL    Bilirubin Urine Negative Negative    Ketones Urine Negative Negative mg/dL    Specific Gravity Urine  <=1.005 1.003 - 1.035    Blood Urine Negative Negative    pH Urine 5.5 5.0 - 7.0    Protein Albumin Urine Negative Negative mg/dL    Urobilinogen Urine 0.2 0.2, 1.0 E.U./dL    Nitrite Urine Negative Negative    Leukocyte Esterase Urine Negative Negative    Narrative    Microscopic not indicated   Wet prep - Clinic Collect     Status: Abnormal    Specimen: Vagina; Swab   Result Value Ref Range    Trichomonas Absent Absent    Yeast Absent Absent    Clue Cells Absent Absent    WBCs/high power field 1+ (A) None

## 2022-09-25 LAB — BACTERIA UR CULT: NO GROWTH

## 2022-12-29 ENCOUNTER — TRANSFERRED RECORDS (OUTPATIENT)
Dept: HEALTH INFORMATION MANAGEMENT | Facility: CLINIC | Age: 72
End: 2022-12-29

## 2022-12-29 LAB
ALT SERPL-CCNC: 13 IU/L (ref 5–35)
AST SERPL-CCNC: 23 U/L (ref 5–34)
CREATININE (EXTERNAL): 0.83 MG/DL (ref 0.5–1.3)
GFR ESTIMATED (EXTERNAL): 72 ML/MIN/1.73M2

## 2023-02-16 ENCOUNTER — OFFICE VISIT (OUTPATIENT)
Dept: INTERNAL MEDICINE | Facility: CLINIC | Age: 73
End: 2023-02-16
Payer: MEDICARE

## 2023-02-16 VITALS
BODY MASS INDEX: 27.28 KG/M2 | DIASTOLIC BLOOD PRESSURE: 75 MMHG | RESPIRATION RATE: 22 BRPM | HEART RATE: 67 BPM | WEIGHT: 180 LBS | SYSTOLIC BLOOD PRESSURE: 102 MMHG | TEMPERATURE: 98.3 F | OXYGEN SATURATION: 98 % | HEIGHT: 68 IN

## 2023-02-16 DIAGNOSIS — R79.9 ABNORMAL FINDING OF BLOOD CHEMISTRY, UNSPECIFIED: ICD-10-CM

## 2023-02-16 DIAGNOSIS — N95.2 VAGINAL ATROPHY: ICD-10-CM

## 2023-02-16 DIAGNOSIS — E78.00 HYPERCHOLESTEROLEMIA: ICD-10-CM

## 2023-02-16 DIAGNOSIS — R07.9 EXERTIONAL CHEST PAIN: Primary | ICD-10-CM

## 2023-02-16 LAB
ALBUMIN SERPL BCG-MCNC: 4.7 G/DL (ref 3.5–5.2)
ALP SERPL-CCNC: 41 U/L (ref 35–104)
ALT SERPL W P-5'-P-CCNC: 20 U/L (ref 10–35)
ANION GAP SERPL CALCULATED.3IONS-SCNC: 12 MMOL/L (ref 7–15)
AST SERPL W P-5'-P-CCNC: 29 U/L (ref 10–35)
BILIRUB SERPL-MCNC: 0.5 MG/DL
BUN SERPL-MCNC: 23.9 MG/DL (ref 8–23)
CALCIUM SERPL-MCNC: 10 MG/DL (ref 8.8–10.2)
CHLORIDE SERPL-SCNC: 104 MMOL/L (ref 98–107)
CHOLEST SERPL-MCNC: 220 MG/DL
CREAT SERPL-MCNC: 0.91 MG/DL (ref 0.51–0.95)
DEPRECATED HCO3 PLAS-SCNC: 25 MMOL/L (ref 22–29)
GFR SERPL CREATININE-BSD FRML MDRD: 67 ML/MIN/1.73M2
GLUCOSE SERPL-MCNC: 105 MG/DL (ref 70–99)
HBA1C MFR BLD: 5.8 % (ref 0–5.6)
HDLC SERPL-MCNC: 96 MG/DL
LDLC SERPL CALC-MCNC: 108 MG/DL
NONHDLC SERPL-MCNC: 124 MG/DL
POTASSIUM SERPL-SCNC: 4.5 MMOL/L (ref 3.4–5.3)
PROT SERPL-MCNC: 7.5 G/DL (ref 6.4–8.3)
SODIUM SERPL-SCNC: 141 MMOL/L (ref 136–145)
TRIGL SERPL-MCNC: 80 MG/DL

## 2023-02-16 PROCEDURE — 99214 OFFICE O/P EST MOD 30 MIN: CPT | Performed by: INTERNAL MEDICINE

## 2023-02-16 PROCEDURE — 80053 COMPREHEN METABOLIC PANEL: CPT | Performed by: INTERNAL MEDICINE

## 2023-02-16 PROCEDURE — 83036 HEMOGLOBIN GLYCOSYLATED A1C: CPT | Performed by: INTERNAL MEDICINE

## 2023-02-16 PROCEDURE — 36415 COLL VENOUS BLD VENIPUNCTURE: CPT | Performed by: INTERNAL MEDICINE

## 2023-02-16 PROCEDURE — 80061 LIPID PANEL: CPT | Performed by: INTERNAL MEDICINE

## 2023-02-16 RX ORDER — ESTRADIOL 0.1 MG/G
0.5 CREAM VAGINAL DAILY
Qty: 42.5 G | Refills: 11 | Status: SHIPPED | OUTPATIENT
Start: 2023-02-16 | End: 2024-02-27

## 2023-02-16 RX ORDER — ATORVASTATIN CALCIUM 20 MG/1
20 TABLET, FILM COATED ORAL AT BEDTIME
Qty: 90 TABLET | Refills: 3 | Status: SHIPPED | OUTPATIENT
Start: 2023-02-16 | End: 2023-03-16

## 2023-02-16 NOTE — PROGRESS NOTES
Assessment & Plan     Hypercholesterolemia  Hypercholesterolemia  She had increased her cholesterol pill due to a high LDL so we will recheck today.  - atorvastatin (LIPITOR) 20 MG tablet; Take 1 tablet (20 mg) by mouth At Bedtime  - Lipid panel reflex to direct LDL Fasting; Future  - Comprehensive metabolic panel; Future  - Hemoglobin A1c; Future  - Lipid panel reflex to direct LDL Fasting  - Comprehensive metabolic panel  - Hemoglobin A1c    Vaginal atrophy    - estradiol (ESTRACE) 0.1 MG/GM vaginal cream; Place 0.5 g vaginally daily    Abnormal finding of blood chemistry, unspecified  She has mild prediabetes  - Hemoglobin A1c; Future  - Hemoglobin A1c    Exertional chest pain  With her risk factors all new onset exertional chest pain needs to be evaluated by stress test.  Recommend a nuclear medicine .  Continue normal exercise unless pain is severe.  She has no pain at rest no current GI symptoms.  - NM MPI Treadmill; Future  - atorvastatin (LIPITOR) 20 MG tablet  Dispense: 90 tablet; Refill: 3  - Lipid panel reflex to direct LDL Fasting  - Comprehensive metabolic panel  - Hemoglobin A1c  - Lipid panel reflex to direct LDL Fasting  - Comprehensive metabolic panel  - Hemoglobin A1c    Vaginal atrophy    - estradiol (ESTRACE) 0.1 MG/GM vaginal cream  Dispense: 42.5 g; Refill: 11    Abnormal finding of blood chemistry, unspecified    - Hemoglobin A1c  - Hemoglobin A1c    Exertional chest pain    - NM MPI Treadmill               No other significant risk factors other than age and hyperlipidemia.    Return if symptoms worsen or fail to improve, for Follow up.    Sarah Beth Carvalho MD  [facility]    Sarai James is a 72 year old accompanied by her self, presenting for the following health issues:  Office Visit (When exercising pt feels pain in chest. Only happens when beginning to exercise. Pain is not sharp. This has been going on for about a month.) and Recheck  "Medication      History of Present Illness       Reason for visit:  Pain in upper chest  Symptom onset:  More than a month  Symptoms include:  Pain in upper chest when i exercise  Symptom intensity:  Moderate  Symptom progression:  Staying the same  Had these symptoms before:  No  What makes it worse:  Not really  What makes it better:  Not really    She eats 2-3 servings of fruits and vegetables daily.She consumes 0 sweetened beverage(s) daily.She exercises with enough effort to increase her heart rate 30 to 60 minutes per day.  She exercises with enough effort to increase her heart rate 6 days per week.   She is taking medications regularly.     Patient Active Problem List   Diagnosis     Lupus Anticoagulant     Esophageal Reflux     Lumbar Disc Degeneration     Rheumatoid arthritis (H)     Hypercholesterolemia     Family history of breast cancer     Lumbar radiculopathy     Chronic pain     Bleeding duodenal ulcer     Oral lichen planus     Chronic low back pain without sciatica     Complete tear of left rotator cuff     Raynaud's disease without gangrene     Lichen sclerosus of female genitalia     Healthcare maintenance     Current Outpatient Medications   Medication     atorvastatin (LIPITOR) 20 MG tablet     celecoxib (CELEBREX) 200 MG capsule     diclofenac sodium 3 % Gel     estradiol (ESTRACE) 0.1 MG/GM vaginal cream     GOLIMUMAB (SIMPONI ARIA IV)     hydroxychloroquine (PLAQUENIL) 200 mg tablet     Lactobacillus rhamnosus GG (CULTURELLE) 10-15 Billion cell capsule     multivitamin therapeutic (THERAGRAN) tablet     sulfaSALAzine (AZULFIDINE ENTAB) 500 MG EC tablet     nystatin (MYCOSTATIN) 100,000 unit/mL suspension     No current facility-administered medications for this visit.               Review of Systems         Objective    /75 (BP Location: Left arm, Patient Position: Sitting, Cuff Size: Adult Regular)   Pulse 67   Temp 98.3  F (36.8  C) (Tympanic)   Resp 22   Ht 1.727 m (5' 8\")   Wt " 81.6 kg (180 lb)   SpO2 98%   BMI 27.37 kg/m    Body mass index is 27.37 kg/m .  Physical Exam   GENERAL: healthy, alert and no distress  NECK: no adenopathy, no asymmetry, masses, or scars and thyroid normal to palpation  RESP: lungs clear to auscultation - no rales, rhonchi or wheezes  CV: regular rate and rhythm, normal S1 S2, no S3 or S4, no murmur, click or rub, no peripheral edema and peripheral pulses strong  ABDOMEN: soft, nontender, no hepatosplenomegaly, no masses and bowel sounds normal  MS: no gross musculoskeletal defects noted, no edema

## 2023-02-27 ENCOUNTER — TRANSFERRED RECORDS (OUTPATIENT)
Dept: MULTI SPECIALTY CLINIC | Facility: CLINIC | Age: 73
End: 2023-02-27

## 2023-02-27 ENCOUNTER — TRANSFERRED RECORDS (OUTPATIENT)
Dept: HEALTH INFORMATION MANAGEMENT | Facility: CLINIC | Age: 73
End: 2023-02-27

## 2023-02-27 LAB
ALT SERPL-CCNC: 15 IU/L (ref 5–35)
AST SERPL-CCNC: 27 U/L (ref 5–34)
CREATININE (EXTERNAL): 0.82 MG/DL (ref 0.5–1.3)
GFR ESTIMATED (EXTERNAL): 73 ML/MIN/1.73M2

## 2023-03-02 ENCOUNTER — HOSPITAL ENCOUNTER (OUTPATIENT)
Dept: NUCLEAR MEDICINE | Facility: CLINIC | Age: 73
Discharge: HOME | End: 2023-03-02
Attending: INTERNAL MEDICINE
Payer: MEDICARE

## 2023-03-02 ENCOUNTER — HOSPITAL ENCOUNTER (OUTPATIENT)
Dept: CARDIOLOGY | Facility: CLINIC | Age: 73
Discharge: HOME | End: 2023-03-02
Attending: INTERNAL MEDICINE
Payer: MEDICARE

## 2023-03-02 DIAGNOSIS — R07.9 EXERTIONAL CHEST PAIN: ICD-10-CM

## 2023-03-02 LAB
CV STRESS CURRENT BP HE: NORMAL
CV STRESS CURRENT HR HE: 100
CV STRESS CURRENT HR HE: 103
CV STRESS CURRENT HR HE: 104
CV STRESS CURRENT HR HE: 104
CV STRESS CURRENT HR HE: 106
CV STRESS CURRENT HR HE: 108
CV STRESS CURRENT HR HE: 110
CV STRESS CURRENT HR HE: 116
CV STRESS CURRENT HR HE: 118
CV STRESS CURRENT HR HE: 121
CV STRESS CURRENT HR HE: 123
CV STRESS CURRENT HR HE: 127
CV STRESS CURRENT HR HE: 133
CV STRESS CURRENT HR HE: 135
CV STRESS CURRENT HR HE: 136
CV STRESS CURRENT HR HE: 83
CV STRESS CURRENT HR HE: 85
CV STRESS CURRENT HR HE: 91
CV STRESS CURRENT HR HE: 92
CV STRESS CURRENT HR HE: 93
CV STRESS CURRENT HR HE: 94
CV STRESS CURRENT HR HE: 94
CV STRESS CURRENT HR HE: 96
CV STRESS CURRENT HR HE: 96
CV STRESS CURRENT HR HE: 98
CV STRESS DEVIATION TIME HE: NORMAL
CV STRESS ECHO PERCENT HR HE: NORMAL
CV STRESS EXERCISE STAGE HE: NORMAL
CV STRESS EXERCISE STAGE REACHED HE: NORMAL
CV STRESS FINAL RESTING BP HE: NORMAL
CV STRESS FINAL RESTING HR HE: 94
CV STRESS MAX HR HE: 136
CV STRESS MAX TREADMILL GRADE HE: 14
CV STRESS MAX TREADMILL SPEED HE: 3.4
CV STRESS PEAK DIA BP HE: NORMAL
CV STRESS PEAK SYS BP HE: NORMAL
CV STRESS PHASE HE: NORMAL
CV STRESS PROTOCOL HE: NORMAL
CV STRESS RESTING PT POSITION HE: NORMAL
CV STRESS RESTING PT POSITION HE: NORMAL
CV STRESS ST DEVIATION AMOUNT HE: NORMAL
CV STRESS ST DEVIATION ELEVATION HE: NORMAL
CV STRESS ST EVELATION AMOUNT HE: NORMAL
CV STRESS TEST TYPE HE: NORMAL
CV STRESS TOTAL STAGE TIME MIN 1 HE: NORMAL
NUC STRESS EJECTION FRACTION: 59 %
RATE PRESSURE PRODUCT: NORMAL
STRESS ECHO BASELINE DIASTOLIC HE: 76
STRESS ECHO BASELINE HR: 83
STRESS ECHO BASELINE SYSTOLIC BP: 108
STRESS ECHO CALCULATED PERCENT HR: 91 %
STRESS ECHO LAST STRESS DIASTOLIC BP: 74
STRESS ECHO LAST STRESS HR: 135
STRESS ECHO LAST STRESS SYSTOLIC BP: 120
STRESS ECHO POST ESTIMATED WORKLOAD: 8.3
STRESS ECHO POST EXERCISE DUR MIN: 6
STRESS ECHO POST EXERCISE DUR SEC: 53
STRESS ECHO TARGET HR: 149
STRESS ST DEPRESSION: 2 MM

## 2023-03-02 PROCEDURE — 93017 CV STRESS TEST TRACING ONLY: CPT

## 2023-03-02 PROCEDURE — G1010 CDSM STANSON: HCPCS

## 2023-03-02 PROCEDURE — 343N000001 HC RX 343: Performed by: INTERNAL MEDICINE

## 2023-03-02 PROCEDURE — 93017 CV STRESS TEST TRACING ONLY: CPT | Performed by: INTERNAL MEDICINE

## 2023-03-02 PROCEDURE — 93018 CV STRESS TEST I&R ONLY: CPT | Performed by: INTERNAL MEDICINE

## 2023-03-02 PROCEDURE — 78452 HT MUSCLE IMAGE SPECT MULT: CPT | Mod: 26 | Performed by: INTERNAL MEDICINE

## 2023-03-02 PROCEDURE — 93016 CV STRESS TEST SUPVJ ONLY: CPT | Performed by: INTERNAL MEDICINE

## 2023-03-02 PROCEDURE — A9500 TC99M SESTAMIBI: HCPCS | Performed by: INTERNAL MEDICINE

## 2023-03-02 PROCEDURE — G1010 CDSM STANSON: HCPCS | Performed by: INTERNAL MEDICINE

## 2023-03-02 RX ADMIN — Medication 8.1 MILLICURIE: at 10:40

## 2023-03-02 RX ADMIN — Medication 30.9 MILLICURIE: at 11:20

## 2023-03-03 ENCOUNTER — TELEPHONE (OUTPATIENT)
Dept: INTERNAL MEDICINE | Facility: CLINIC | Age: 73
End: 2023-03-03
Payer: MEDICARE

## 2023-03-03 DIAGNOSIS — R94.31 ABNORMAL ELECTROCARDIOGRAM (ECG) (EKG): ICD-10-CM

## 2023-03-03 DIAGNOSIS — R94.39 ABNORMAL CARDIOVASCULAR STRESS TEST: Primary | ICD-10-CM

## 2023-03-03 NOTE — TELEPHONE ENCOUNTER
Creatinine   Date Value Ref Range Status   02/16/2023 0.91 0.51 - 0.95 mg/dL Final     To patient about abnormal stress test.  There is no evidence of ischemia but there is an abnormal EKG response and also area of possible prior infarction.  This could be an attenuation artifact she has no congestive heart failure.  She needs a confirmation with CT angiogram and echocardiogram.  I do not think she needs to see rapid cardiology as yet as the future risk is still low but cardiology referral was made to evaluate all these tests once they are done.

## 2023-03-03 NOTE — TELEPHONE ENCOUNTER
General Call      Reason for Call:  Pt had a Nuclear Stress test done on 03/092/2023 and has the results but would like further explanation    Please advise    What are your questions or concerns:  n/a    Date of last appointment with provider: n/a    Could we send this information to you in [facility] or would you prefer to receive a phone call?:   Patient would prefer a phone call   Okay to leave a detailed message?: Yes at Cell number on file:    Telephone Information:   Mobile [phone]

## 2023-03-06 ENCOUNTER — NURSE TRIAGE (OUTPATIENT)
Dept: NURSING | Facility: CLINIC | Age: 73
End: 2023-03-06
Payer: MEDICARE

## 2023-03-06 DIAGNOSIS — R94.39 ABNORMAL STRESS TEST: ICD-10-CM

## 2023-03-06 DIAGNOSIS — R07.89 ATYPICAL CHEST PAIN: Primary | ICD-10-CM

## 2023-03-06 RX ORDER — OMEPRAZOLE 40 MG/1
40 CAPSULE, DELAYED RELEASE ORAL DAILY
Qty: 90 CAPSULE | Refills: 3 | Status: SHIPPED | OUTPATIENT
Start: 2023-03-06

## 2023-03-06 NOTE — TELEPHONE ENCOUNTER
I will reorder CT angio as a stat.  A cardiology referral had also been made please give her the numbers to call for the CT angiogram and cardiology I believe by even calling the main appointment line she can have these tests and referrals scheduled..  However as mentioned to her in the phone call reassure her that pain is not likely due to her heart because the stress test did not show active angina it showed an old area of possible scarring.  Also the way she has pain lying down and at rest makes me think it is more likely reflux pain.  I would recommend she take omeprazole 40 mg once daily, holding  the Celebrex which can exacerbate reflux pain.

## 2023-03-06 NOTE — TELEPHONE ENCOUNTER
"Nurse Triage SBAR     Situation: Patient calling concerned about getting CT and echo scheduled as well as starting to have more frequent pain now      Background: Thought that she would be getting a call to schedule the CT and echo by now as she was told this would be done stat. She doesn't have numbers to call anybody herself     Assessment: States that now pain is coming and going when just sitting or laying down instead of only when walking now so she wanted provider to be aware incase that changed anything. She states \"it's not bad at all\" just more frequent than it has been     Recommendation: Advised message would be sent to provider to inform of increase in frequency  Patient verbalizes understanding and agrees to plan        Reason for Disposition    [1] Follow-up call from patient regarding patient's clinical status AND [2] information NON-URGENT    Additional Information    Negative: Lab calling with strep throat test results and triager can call in prescription    Negative: Lab calling with urinalysis test results and triager can call in prescription    Negative: Medication questions    Negative: Medication renewal and refill questions    Negative: Pre-operative or pre-procedural questions    Negative: ED call to PCP (i.e., primary care provider; doctor, NP, or PA)    Negative: Doctor (or NP/PA) call to PCP    Negative: Call about patient who is currently hospitalized    Negative: Lab or radiology calling with CRITICAL test results    Negative: [1] Follow-up call from patient regarding patient's clinical status AND [2] information urgent    Negative: [1] Caller requests to speak ONLY to PCP AND [2] URGENT question    Negative: [1] Caller requests to speak to PCP now AND [2] won't tell us reason for call  (Exception: If 10 pm to 6 am, caller must first discuss reason for the call.)    Negative: Notification of hospital admission    Negative: Notification of death    Negative: Caller requesting lab results  " (Exception: Routine or non-urgent lab result.)    Negative: Lab or radiology calling with test results    Protocols used: PCP CALL - NO TRIAGE-A-      Heydi Gar RN [facility] March 6, 2023 2:08 PM

## 2023-03-08 ENCOUNTER — HOSPITAL ENCOUNTER (OUTPATIENT)
Dept: CT IMAGING | Facility: CLINIC | Age: 73
Discharge: HOME | End: 2023-03-08
Attending: INTERNAL MEDICINE | Admitting: INTERNAL MEDICINE
Payer: MEDICARE

## 2023-03-08 VITALS
SYSTOLIC BLOOD PRESSURE: 118 MMHG | WEIGHT: 170 LBS | HEIGHT: 68 IN | DIASTOLIC BLOOD PRESSURE: 59 MMHG | BODY MASS INDEX: 25.76 KG/M2

## 2023-03-08 DIAGNOSIS — R94.39 ABNORMAL STRESS TEST: ICD-10-CM

## 2023-03-08 DIAGNOSIS — R07.89 ATYPICAL CHEST PAIN: ICD-10-CM

## 2023-03-08 PROCEDURE — G1010 CDSM STANSON: HCPCS

## 2023-03-08 PROCEDURE — 75574 CT ANGIO HRT W/3D IMAGE: CPT | Mod: ME

## 2023-03-08 PROCEDURE — 0503T CT FFR: CPT

## 2023-03-08 PROCEDURE — 250N000013 HC RX MED GY IP 250 OP 250 PS 637: Performed by: INTERNAL MEDICINE

## 2023-03-08 PROCEDURE — 0504T CT FFR: CPT | Performed by: INTERNAL MEDICINE

## 2023-03-08 PROCEDURE — 75574 CT ANGIO HRT W/3D IMAGE: CPT | Mod: 26 | Performed by: INTERNAL MEDICINE

## 2023-03-08 PROCEDURE — 250N000009 HC RX 250: Performed by: INTERNAL MEDICINE

## 2023-03-08 PROCEDURE — G1010 CDSM STANSON: HCPCS | Performed by: INTERNAL MEDICINE

## 2023-03-08 PROCEDURE — 250N000011 HC RX IP 250 OP 636: Performed by: INTERNAL MEDICINE

## 2023-03-08 RX ORDER — IOPAMIDOL 755 MG/ML
100 INJECTION, SOLUTION INTRAVASCULAR ONCE
Status: COMPLETED | OUTPATIENT
Start: 2023-03-08 | End: 2023-03-08

## 2023-03-08 RX ORDER — METOPROLOL TARTRATE 1 MG/ML
5 INJECTION, SOLUTION INTRAVENOUS
Status: DISCONTINUED | OUTPATIENT
Start: 2023-03-08 | End: 2023-03-09 | Stop reason: HOSPADM

## 2023-03-08 RX ORDER — NITROGLYCERIN 0.4 MG/1
0.4 TABLET SUBLINGUAL ONCE
Status: COMPLETED | OUTPATIENT
Start: 2023-03-08 | End: 2023-03-08

## 2023-03-08 RX ADMIN — NITROGLYCERIN 0.4 MG: 0.4 TABLET SUBLINGUAL at 13:09

## 2023-03-08 RX ADMIN — IOPAMIDOL 100 ML: 755 INJECTION, SOLUTION INTRAVENOUS at 13:08

## 2023-03-08 RX ADMIN — METOPROLOL TARTRATE 5 MG: 1 INJECTION, SOLUTION INTRAVENOUS at 13:12

## 2023-03-09 ENCOUNTER — TELEPHONE (OUTPATIENT)
Dept: INTERNAL MEDICINE | Facility: CLINIC | Age: 73
End: 2023-03-09
Payer: MEDICARE

## 2023-03-09 DIAGNOSIS — R94.39 POSITIVE CARDIAC STRESS TEST: ICD-10-CM

## 2023-03-09 DIAGNOSIS — E78.2 MIXED HYPERLIPIDEMIA: Primary | ICD-10-CM

## 2023-03-09 DIAGNOSIS — I20.89 ANGINA AT REST (H): ICD-10-CM

## 2023-03-09 LAB — BSA FOR ECHO PROCEDURE: 0 M2

## 2023-03-09 RX ORDER — ATORVASTATIN CALCIUM 40 MG/1
40 TABLET, FILM COATED ORAL DAILY
Qty: 90 TABLET | Refills: 3 | Status: ON HOLD | OUTPATIENT
Start: 2023-03-09 | End: 2023-03-27

## 2023-03-09 RX ORDER — ISOSORBIDE MONONITRATE 30 MG/1
30 TABLET, EXTENDED RELEASE ORAL DAILY
Qty: 34 TABLET | Refills: 3 | Status: ON HOLD | OUTPATIENT
Start: 2023-03-09 | End: 2023-03-27

## 2023-03-09 RX ORDER — NITROGLYCERIN 0.4 MG/1
TABLET SUBLINGUAL
Qty: 30 TABLET | Refills: 1 | Status: SHIPPED | OUTPATIENT
Start: 2023-03-09

## 2023-03-09 NOTE — TELEPHONE ENCOUNTER
Mild to moderate coronary atherosclerosis.  But no critical source to severe stenosis however her symptoms are actually more alarming than I realize she has classic and chest pain on exertion.  I have already asked her to start aspirin double the Lipitor to 40 mg will add Imdur she has a low normal blood pressure and should check it fabien day but recommend rapid access clinic to evaluate her symptoms and CT angiogram.

## 2023-03-13 ENCOUNTER — HOSPITAL ENCOUNTER (OUTPATIENT)
Dept: CARDIOLOGY | Facility: CLINIC | Age: 73
Discharge: HOME | End: 2023-03-13
Attending: INTERNAL MEDICINE | Admitting: INTERNAL MEDICINE
Payer: MEDICARE

## 2023-03-13 DIAGNOSIS — R94.31 ABNORMAL ELECTROCARDIOGRAM (ECG) (EKG): ICD-10-CM

## 2023-03-13 DIAGNOSIS — R94.39 ABNORMAL CARDIOVASCULAR STRESS TEST: ICD-10-CM

## 2023-03-13 LAB — LVEF ECHO: NORMAL

## 2023-03-13 PROCEDURE — 93306 TTE W/DOPPLER COMPLETE: CPT | Mod: 26 | Performed by: INTERNAL MEDICINE

## 2023-03-13 PROCEDURE — 999N000208 ECHOCARDIOGRAM COMPLETE

## 2023-03-13 PROCEDURE — 255N000002 HC RX 255 OP 636: Performed by: INTERNAL MEDICINE

## 2023-03-13 RX ADMIN — PERFLUTREN 3 ML: 6.52 INJECTION, SUSPENSION INTRAVENOUS at 14:20

## 2023-03-14 ENCOUNTER — MYC MEDICAL ADVICE (OUTPATIENT)
Dept: INTERNAL MEDICINE | Facility: CLINIC | Age: 73
End: 2023-03-14
Payer: MEDICARE

## 2023-03-16 ENCOUNTER — OFFICE VISIT (OUTPATIENT)
Dept: CARDIOLOGY | Facility: CLINIC | Age: 73
End: 2023-03-16
Payer: MEDICARE

## 2023-03-16 ENCOUNTER — PREP FOR PROCEDURE (OUTPATIENT)
Dept: CARDIOLOGY | Facility: CLINIC | Age: 73
End: 2023-03-16

## 2023-03-16 ENCOUNTER — TELEPHONE (OUTPATIENT)
Dept: CARDIOLOGY | Facility: CLINIC | Age: 73
End: 2023-03-16

## 2023-03-16 VITALS
DIASTOLIC BLOOD PRESSURE: 78 MMHG | HEART RATE: 88 BPM | HEIGHT: 68 IN | WEIGHT: 178.8 LBS | BODY MASS INDEX: 27.1 KG/M2 | SYSTOLIC BLOOD PRESSURE: 118 MMHG | RESPIRATION RATE: 16 BRPM

## 2023-03-16 DIAGNOSIS — R94.39 ABNORMAL CARDIOVASCULAR STRESS TEST: ICD-10-CM

## 2023-03-16 DIAGNOSIS — I20.0 UNSTABLE ANGINA PECTORIS (H): Primary | ICD-10-CM

## 2023-03-16 DIAGNOSIS — R94.31 ABNORMAL ELECTROCARDIOGRAM (ECG) (EKG): ICD-10-CM

## 2023-03-16 DIAGNOSIS — I20.89 ANGINA AT REST (H): Primary | ICD-10-CM

## 2023-03-16 DIAGNOSIS — M06.9 RHEUMATOID ARTHRITIS, INVOLVING UNSPECIFIED SITE, UNSPECIFIED WHETHER RHEUMATOID FACTOR PRESENT (H): ICD-10-CM

## 2023-03-16 DIAGNOSIS — R94.39 POSITIVE CARDIAC STRESS TEST: ICD-10-CM

## 2023-03-16 DIAGNOSIS — I20.89 ANGINA AT REST (H): ICD-10-CM

## 2023-03-16 PROCEDURE — 99205 OFFICE O/P NEW HI 60 MIN: CPT | Performed by: INTERNAL MEDICINE

## 2023-03-16 RX ORDER — SODIUM CHLORIDE 9 MG/ML
INJECTION, SOLUTION INTRAVENOUS CONTINUOUS
Status: CANCELLED | OUTPATIENT
Start: 2023-03-23

## 2023-03-16 RX ORDER — ASPIRIN 81 MG/1
81 TABLET ORAL DAILY
Status: ON HOLD | COMMUNITY
End: 2023-03-27

## 2023-03-16 RX ORDER — FENTANYL CITRATE 50 UG/ML
25 INJECTION, SOLUTION INTRAMUSCULAR; INTRAVENOUS
Status: CANCELLED | OUTPATIENT
Start: 2023-03-23

## 2023-03-16 RX ORDER — ASPIRIN 325 MG
325 TABLET ORAL ONCE
Status: CANCELLED | OUTPATIENT
Start: 2023-03-23 | End: 2023-03-16

## 2023-03-16 RX ORDER — ASPIRIN 81 MG/1
243 TABLET, CHEWABLE ORAL ONCE
Status: CANCELLED | OUTPATIENT
Start: 2023-03-23

## 2023-03-16 RX ORDER — LIDOCAINE 40 MG/G
CREAM TOPICAL
Status: CANCELLED | OUTPATIENT
Start: 2023-03-16

## 2023-03-16 NOTE — TELEPHONE ENCOUNTER
Verbal order from MAT to arrange for CORS poss PCI next week for abnormal stress test. Order placed. Labs placed. Met with patient. Scheduled. Prep and education completed. Opportunity to ask questions. None further. Documentation and orders placed. Pt ready for procedure. [place] [facility]       ===View-only below this line===  ----- Message -----  From: Ayana Cronin  Sent: 3/16/2023   9:55 AM CDT  To: Marco A Cabrera RN  Subject: MAT ORDERING                                     Case type: CA    Procedure Physician(s): RODGER/EKNDRA    Procedure Date and Patient Arrival Time: 3/23 630AM ARRIVAL    H&P: 3/16 MAT    Alerts: NONE    Pre-Procedure Lab Appt: 3/22  HE[facility] CLINIC

## 2023-03-16 NOTE — LETTER
3/16/2023    Sarah Beth Carvalho MD  [address] [facility] [address]    RE: Antonia Everett       Dear Colleague,     I had the pleasure of seeing Antonia Everett in the [facility].    HEART CARE ENCOUNTER CONSULTATON NOTE      [facility]  [phone]      Assessment/Recommendations   Assessment:   1.  Anginal chest pain, progressive angina  2.  Abnormal stress test, abnormal ECG portion of stress test associate with chest pain during exercise  3.  Abnormal CT coronary angiogram with evidence of obstructive coronary disease  4.  Rheumatoid arthritis    Plan:   1.  Recommend coronary angiogram poss percutaneous coronary intervention.  Discussed procedure in detail.  Indication for coronary angiogram is progressive angina with abnormal treadmill ECG stress test.  2.  Aspirin 81 mg daily  3.  Continue Imdur 30 mg daily at this time.  Can discontinue after interim if revascularization completed  4.  Continue atorvastatin  5.  Agree with discontinuation of Celebrex       History of Present Illness/Subjective    HPI: Antonia Everett is a 72 year old female rheumatoid arthritis who presents to cardiology clinic in consultation for anginal chest pain.    Past 2 months during her walks has been noticing a pressure and pain in her chest.  Pain is progressed now to intermittent chest pain at rest.  She states when doing her swimming routine or walking she developed a pressure in her chest.  Typically if she rests the symptoms improved within 5 minutes.  Longest episode of chest pain was 20 minutes.  More recently she been noticing chest discomfort at night and waking her from her sleep.  Given the symptoms she underwent treadmill nuclear stress testing which EKG portion was abnormal.  She also developed chest pain on the treadmill.  Given these findings she underwent a coronary CT angiogram which confirmed obstructive coronary disease based on FFR analysis.    Long discussion with  "the patient regarding recent testing.  Discussed treatment options including medical management versus revascularization options.  Given she is a very active 72-year-old would recommend coronary angiogram with poss percutaneous coronary mention and revascularization.  She did note some improvement with use of Imdur but continues to have symptoms.    ECG treadmill Stress: Personally reviewed EKG stress test.  Patient had significant ST segment depression in multiple leads with exercise.  She had 2 to 3 mm ST depression which was horizontal to downsloping and improved after 3 to 5 minutes of rest.  Patient did have chest pain with exercise during stress testing.    Coronary CT angiogram: 2023    There is diffuse plaque in the proximal to mid left anterior descending with mild to moderate luminal stenosis. FFR analysis in the left anterior descending and major diagonal branches suggests low likelihood of lesion specific ischemia.    There is diffuse plaque in the right coronary artery with FFR analysis suggesting low likelihood of lesion specific ischemia.    FFR analysis in both the moderate-sized posterior descending and posterior lateral branches suggests higher likelihood of lesion specific ischemia and suggests the possibility of significant flow-limiting stenosis in both branches. Clinical correlation is recommended..    Please see separate report for radiology for additional findings.      Echocardiogram Results: 3/13/2023  Left ventricular size, wall motion and function are normal. The ejection  fraction is 60-65%.  Normal right ventricle size and systolic function.  No hemodynamically significant valvular abnormalities on 2D or color flow  imaging.       Physical Examination  Review of Systems   Vitals: /78 (BP Location: Right arm, Patient Position: Sitting, Cuff Size: Adult Regular)   Pulse 88   Resp 16   Ht 1.727 m (5' 8\")   Wt 81.1 kg (178 lb 12.8 oz)   BMI 27.19 kg/m    BMI= Body mass index is " 27.19 kg/m .  Wt Readings from Last 3 Encounters:   03/16/23 81.1 kg (178 lb 12.8 oz)   03/08/23 77.1 kg (170 lb)   02/16/23 81.6 kg (180 lb)        Pleasant   ENT/Mouth: membranes moist, no oral lesions or bleeding gums.      EYES:  no scleral icterus, normal conjunctivae       Chest/Lungs:   lungs are clear to auscultation, no rales or wheezing, no sternal scar, equal chest wall expansion    Cardiovascular:   Regular. Normal first and second heart sounds with no murmurs, rubs, or gallops; the carotid, radial and posterior tibial pulses are intact, Jugular venous pressure normal, no edema bilaterally        Extremities: no cyanosis or clubbing   Skin: no xanthelasma, warm.    Neurologic: normal  bilateral, no tremors     Psychiatric: alert and oriented x3, calm        Please refer above for cardiac ROS details.        Medical History  Surgical History Family History Social History   Past Medical History:   Diagnosis Date     Bleeding duodenal ulcer 2010     Chronic diarrhea 04/20/2017    Colonoscopy normal except inflammatory polyp or     Chronic left shoulder pain 9/26/2017     Chronic low back pain without sciatica 11/3/2017     Chronic pain      Chronic radicular low back pain     Pain clinic.  Nerve stimulator placement     Complete tear of left rotator cuff 4/3/2018     Duodenal ulcer     bleeding     Family history of breast cancer      GERD (gastroesophageal reflux disease)      H/O ulcer disease     secondary to medications     Herpes zoster 2010     History of transfusion      Hypercholesterolemia      LA (lupus anticoagulant) disorder (H)      Left hip pain 2015    bursitis, evaluated by orthopedics     Lichen sclerosus of female genitalia 6/16/2020     MRSA (methicillin resistant staph aureus) culture positive     betweeen 1996 and 2000     Oral lichen planus 4/20/2017     Plantar fasciitis      Raynaud's disease without gangrene 11/20/2018    Involving both feet, describing burning sensation,  redness and coolness to touch     Rheumatoid arthritis (H)     Followed by rheumatology, Dr. Felipe,      Screening     DEXA normal T score -0.9 2016, minimal change compared to previous DEXA     VRE (vancomycin resistant enterococcus) culture positive     between 1996 and 2000     Past Surgical History:   Procedure Laterality Date     ABDOMINAL HERNIA REPAIR       APPENDECTOMY       ARTHROSCOPY SHOULDER ROTATOR CUFF REPAIR Right      ARTHROSCOPY SHOULDER ROTATOR CUFF REPAIR Left 04/2018     BACK SURGERY       BIOPSY BREAST Left      COLONOSCOPY      Normal colonoscopy May 2017 except inflammatory polyp     ESOPHAGOSCOPY, GASTROSCOPY, DUODENOSCOPY (EGD), COMBINED N/A 12/16/2018    Procedure: ESOPHAGOGASTRODUODENOSCOPY (EGD) with biopsy and foreign body removal;  Surgeon: Raymond Triana MD;  Location: [facility];  Service: Gastroenterology     HC REMOVAL OF TONSILS,<13 Y/O      Description: Tonsillectomy;  Recorded: 09/30/2011;     HYSTERECTOMY      LUIS ANGEL with BSO     HYSTERECTOMY TOTAL ABDOMINAL, BILATERAL SALPINGO-OOPHORECTOMY, COMBINED       KNEE SURGERY Right 2007    Arthroscopic knee surgery     OPEN REDUCTION INTERNAL FIXATION ELBOW  2014     [place] ARTHRODESIS ANT INTERBODY MIN DISCECTOMY,LUMBAR      Description: Lumbar Vertebral Fusion;  Recorded: 09/30/2011;  Comments: L5-S1 fusion     [place] STEREOTACT STIM SPINAL CORD      Description: Spinal Stereotaxis Stimulation Of Cord;  Recorded: 09/30/2011;  Comments: implanted 8/6/2007 with lead revision 2011     SKIN GRAFT  1996     Family History   Problem Relation Age of Onset     Breast Cancer Mother 74.00     Cerebrovascular Disease Mother      Lung Cancer Father 70.00     Breast Cancer Maternal Aunt 70.00     Liver Cancer Brother 48.00        Social History     Socioeconomic History     Marital status:      Spouse name: Not on file     Number of children: Not on file     Years of education: Not on file     Highest education level: Not on file    Occupational History     Not on file   Tobacco Use     Smoking status: Former     Types: Cigarettes     Quit date: 1970     Years since quittin.8     Smokeless tobacco: Never   Vaping Use     Vaping Use: Never used   Substance and Sexual Activity     Alcohol use: No     Drug use: No     Sexual activity: Not on file   Other Topics Concern     Not on file   Social History Narrative    Lives at home with her  , gardening , bikes , swims 4-5 miles walk a day        Social Determinants of Health     Financial Resource Strain: Not on file   Food Insecurity: Not on file   Transportation Needs: Not on file   Physical Activity: Not on file   Stress: Not on file   Social Connections: Not on file   Intimate Partner Violence: Not on file   Housing Stability: Not on file           Medications  Allergies   Current Outpatient Medications   Medication Sig Dispense Refill     aspirin 81 MG EC tablet Take 81 mg by mouth daily       atorvastatin (LIPITOR) 40 MG tablet Take 1 tablet (40 mg) by mouth daily 90 tablet 3     diclofenac sodium 3 % Gel [DICLOFENAC SODIUM 3 % GEL] 4 g knees three times aday  and 2 g on hands or lebows three times a day 3 Tube 3     estradiol (ESTRACE) 0.1 MG/GM vaginal cream Place 0.5 g vaginally daily 42.5 g 11     GOLIMUMAB (SIMPONI ARIA IV) [GOLIMUMAB (SIMPONI ARIA IV)] Infuse into a venous catheter every 2 (two) months.        hydroxychloroquine (PLAQUENIL) 200 mg tablet [HYDROXYCHLOROQUINE (PLAQUENIL) 200 MG TABLET] Take 400 mg by mouth daily.        isosorbide mononitrate (IMDUR) 30 MG 24 hr tablet Take 1 tablet (30 mg) by mouth daily 34 tablet 3     Lactobacillus rhamnosus GG (CULTURELLE) 10-15 Billion cell capsule [LACTOBACILLUS RHAMNOSUS GG (CULTURELLE) 10-15 BILLION CELL CAPSULE] Take 1 capsule by mouth daily.       multivitamin therapeutic (THERAGRAN) tablet [MULTIVITAMIN THERAPEUTIC (THERAGRAN) TABLET] Take 1 tablet by mouth daily.       nitroGLYcerin (NITROSTAT) 0.4 MG  sublingual tablet For chest pain place 1 tablet under the tongue every 5 minutes for 3 doses. If symptoms persist 5 minutes after 1st dose call 911. 30 tablet 1     nystatin (MYCOSTATIN) 100,000 unit/mL suspension        omeprazole (PRILOSEC) 40 MG DR capsule Take 1 capsule (40 mg) by mouth daily 90 capsule 3     sulfaSALAzine (AZULFIDINE ENTAB) 500 MG EC tablet [SULFASALAZINE (AZULFIDINE ENTAB) 500 MG EC TABLET] 1,500 mg 2 (two) times a day.        celecoxib (CELEBREX) 200 MG capsule Take 1 capsule (200 mg) by mouth 2 times daily (Patient not taking: Reported on 3/16/2023) 180 capsule 3       Allergies   Allergen Reactions     Codeine Unknown     nausea     Gabapentin Unknown     Fatigue       Pregabalin Unknown     Fatigue            Lab Results    Chemistry/lipid CBC Cardiac Enzymes/BNP/TSH/INR   Recent Labs   Lab Test 02/16/23  1229   CHOL 220*   HDL 96   *   TRIG 80     Recent Labs   Lab Test 02/16/23  1229 09/13/22  1028 06/24/21  0943   * 158* 147*     Recent Labs   Lab Test 02/16/23  1229      POTASSIUM 4.5   CHLORIDE 104   CO2 25   *   BUN 23.9*   CR 0.91   GFRESTIMATED 67   LULU 10.0     Recent Labs   Lab Test 02/16/23  1229 06/24/21  0943   CR 0.91 0.92     Recent Labs   Lab Test 02/16/23  1229 09/13/22  1028 06/24/21  0943   A1C 5.8* 5.8* 5.7*          Recent Labs   Lab Test 06/24/21  0943   WBC 3.6*   HGB 14.0   HCT 42.9           Recent Labs   Lab Test 06/24/21  0943 04/03/18  1422   HGB 14.0 14.0    No results for input(s): TROPONINI in the last 44303 hours.  No results for input(s): BNP, NTBNPI, NTBNP in the last 07336 hours.  Recent Labs   Lab Test 09/13/22  1028   TSH 2.23     No results for input(s): INR in the last 88478 hours.     Germán Trevino DO    Today's clinic visit entailed:  61 minutes spent on the date of the encounter doing chart review, history and exam, documentation and further activities per the note        Thank you for allowing me to  participate in the care of your patient.      Sincerely,     Germán Trevino [facility]  cc:   Sarah Beth Carvalho MD  [address] [place] [id number]

## 2023-03-16 NOTE — TELEPHONE ENCOUNTER
Antonia HOSSEIN Everett  [address]  [facility] [address]  [phone] (home)       Reason: Abnormal stress test  Procedure cardiologist:  Dr. Powers or Dr Bray   PCP:  Sarah Beth Carvalho  H&P completed by:  Dr. Trevino 3/16/23  Admit date 3/23/23  Arrival time:  0630  Anticoagulation: None.  CPAP: No  Previous PCI: None  Bypass Grafts: No  Renal Issues: No  Diabetic?: No  Device?: No    Ambulatory?: Independently.    Angiogram Teaching    Reason for Visit:  Patient seen for pre-procedure education in preparation for: Coronary Angiogram with Possible Percutaneous Coronary Intervention     Procedure Prep:  Primary Cardiologist note dated: 3/16/23  EKG results obtained, dated: Morning of procedure  Pre-procedure labs: Hemogram, BMP and T&S results obtained: 3/22/23  Lipid Profile results obtained: 2/16/23    Pre-procedure instructions  Patient instructed to be NPO after midnight.  Patient instructed to shower the evening before or the morning of the procedure.  Leave all valuables at home (jewlery, rings, watches, large amounts of money).  Patient understands (2) visitors allowed during patients stay.   Patient instructed to arrange for transportation home following procedure from a responsible family member of friend. No driving for at least 24 hours post-procedure.  Patient instructed to have a responsible adult with them for 24 hours post-procedure.  Post-procedure follow up process.  Conscious sedation discussed.  Check your temperature morning of procedure along with assessment for any covid symptoms. If symptoms or temperature, greater than or equal to 100.0, call [facility] at [phone] before leaving for the hospital.      Pre-procedure medication instructions  Patient instructed on antiplatelet medication.  Continue medications as scheduled, with a small amount of water on the day of the procedure unless indicated.  Patient instructed to take 325 mg of Aspirin am of procedure: Yes  Other medication: Morning  of procedure please HOLD all vitamins, minerals, and supplements. Please TAKE (4) baby aspirin or (1) full size 325 mg aspirin morning of procedure.      *PATIENTS RECORDS AVAILABLE IN [facility] UNLESS OTHERWISE INDICATED*    Patient Active Problem List   Diagnosis     Lupus Anticoagulant     Esophageal Reflux     Lumbar Disc Degeneration     Rheumatoid arthritis (H)     Hypercholesterolemia     Family history of breast cancer     Lumbar radiculopathy     Chronic pain     Bleeding duodenal ulcer     Oral lichen planus     Chronic low back pain without sciatica     Complete tear of left rotator cuff     Raynaud's disease without gangrene     Lichen sclerosus of female genitalia     Healthcare maintenance       Current Outpatient Medications   Medication Sig Dispense Refill     aspirin 81 MG EC tablet Take 81 mg by mouth daily       atorvastatin (LIPITOR) 40 MG tablet Take 1 tablet (40 mg) by mouth daily 90 tablet 3     celecoxib (CELEBREX) 200 MG capsule Take 1 capsule (200 mg) by mouth 2 times daily (Patient not taking: Reported on 3/16/2023) 180 capsule 3     diclofenac sodium 3 % Gel [DICLOFENAC SODIUM 3 % GEL] 4 g knees three times aday  and 2 g on hands or lebows three times a day 3 Tube 3     estradiol (ESTRACE) 0.1 MG/GM vaginal cream Place 0.5 g vaginally daily 42.5 g 11     GOLIMUMAB (SIMPONI ARIA IV) [GOLIMUMAB (SIMPONI ARIA IV)] Infuse into a venous catheter every 2 (two) months.        hydroxychloroquine (PLAQUENIL) 200 mg tablet [HYDROXYCHLOROQUINE (PLAQUENIL) 200 MG TABLET] Take 400 mg by mouth daily.        isosorbide mononitrate (IMDUR) 30 MG 24 hr tablet Take 1 tablet (30 mg) by mouth daily 34 tablet 3     Lactobacillus rhamnosus GG (CULTURELLE) 10-15 Billion cell capsule [LACTOBACILLUS RHAMNOSUS GG (CULTURELLE) 10-15 BILLION CELL CAPSULE] Take 1 capsule by mouth daily.       multivitamin therapeutic (THERAGRAN) tablet [MULTIVITAMIN THERAPEUTIC (THERAGRAN) TABLET] Take 1 tablet by mouth daily.        nitroGLYcerin (NITROSTAT) 0.4 MG sublingual tablet For chest pain place 1 tablet under the tongue every 5 minutes for 3 doses. If symptoms persist 5 minutes after 1st dose call 911. 30 tablet 1     nystatin (MYCOSTATIN) 100,000 unit/mL suspension        omeprazole (PRILOSEC) 40 MG DR capsule Take 1 capsule (40 mg) by mouth daily 90 capsule 3     sulfaSALAzine (AZULFIDINE ENTAB) 500 MG EC tablet [SULFASALAZINE (AZULFIDINE ENTAB) 500 MG EC TABLET] 1,500 mg 2 (two) times a day.          Allergies   Allergen Reactions     Codeine Unknown     nausea     Gabapentin Unknown     Fatigue       Pregabalin Unknown     Fatigue         Procedure order set placed:     Plan: Patient education completed. Opportunity to ask questions and have them answered. None further at this time. Patient verbalized understanding of responsible adult for 24 hours and  post-procedure at time of discharge. Pt ready for procedure.        Marco A Cabrera RN

## 2023-03-16 NOTE — PROGRESS NOTES
HEART CARE ENCOUNTER CONSULTATON NOTE      [facility]  [phone]      Assessment/Recommendations   Assessment:   1.  Anginal chest pain, progressive angina  2.  Abnormal stress test, abnormal ECG portion of stress test associate with chest pain during exercise  3.  Abnormal CT coronary angiogram with evidence of obstructive coronary disease  4.  Rheumatoid arthritis    Plan:   1.  Recommend coronary angiogram poss percutaneous coronary intervention.  Discussed procedure in detail.  Indication for coronary angiogram is progressive angina with abnormal treadmill ECG stress test.  2.  Aspirin 81 mg daily  3.  Continue Imdur 30 mg daily at this time.  Can discontinue after interim if revascularization completed  4.  Continue atorvastatin  5.  Agree with discontinuation of Celebrex       History of Present Illness/Subjective    HPI: Antonia Everett is a 72 year old female rheumatoid arthritis who presents to cardiology clinic in consultation for anginal chest pain.    Past 2 months during her walks has been noticing a pressure and pain in her chest.  Pain is progressed now to intermittent chest pain at rest.  She states when doing her swimming routine or walking she developed a pressure in her chest.  Typically if she rests the symptoms improved within 5 minutes.  Longest episode of chest pain was 20 minutes.  More recently she been noticing chest discomfort at night and waking her from her sleep.  Given the symptoms she underwent treadmill nuclear stress testing which EKG portion was abnormal.  She also developed chest pain on the treadmill.  Given these findings she underwent a coronary CT angiogram which confirmed obstructive coronary disease based on FFR analysis.    Long discussion with the patient regarding recent testing.  Discussed treatment options including medical management versus revascularization options.  Given she is a very active 72-year-old would recommend coronary angiogram  "with poss percutaneous coronary mention and revascularization.  She did note some improvement with use of Imdur but continues to have symptoms.    ECG treadmill Stress: Personally reviewed EKG stress test.  Patient had significant ST segment depression in multiple leads with exercise.  She had 2 to 3 mm ST depression which was horizontal to downsloping and improved after 3 to 5 minutes of rest.  Patient did have chest pain with exercise during stress testing.    Coronary CT angiogram: 2023    There is diffuse plaque in the proximal to mid left anterior descending with mild to moderate luminal stenosis. FFR analysis in the left anterior descending and major diagonal branches suggests low likelihood of lesion specific ischemia.    There is diffuse plaque in the right coronary artery with FFR analysis suggesting low likelihood of lesion specific ischemia.    FFR analysis in both the moderate-sized posterior descending and posterior lateral branches suggests higher likelihood of lesion specific ischemia and suggests the possibility of significant flow-limiting stenosis in both branches. Clinical correlation is recommended..    Please see separate report for radiology for additional findings.      Echocardiogram Results: 3/13/2023  Left ventricular size, wall motion and function are normal. The ejection  fraction is 60-65%.  Normal right ventricle size and systolic function.  No hemodynamically significant valvular abnormalities on 2D or color flow  imaging.       Physical Examination  Review of Systems   Vitals: /78 (BP Location: Right arm, Patient Position: Sitting, Cuff Size: Adult Regular)   Pulse 88   Resp 16   Ht 1.727 m (5' 8\")   Wt 81.1 kg (178 lb 12.8 oz)   BMI 27.19 kg/m    BMI= Body mass index is 27.19 kg/m .  Wt Readings from Last 3 Encounters:   03/16/23 81.1 kg (178 lb 12.8 oz)   03/08/23 77.1 kg (170 lb)   02/16/23 81.6 kg (180 lb)        Pleasant   ENT/Mouth: membranes moist, no oral lesions or " bleeding gums.      EYES:  no scleral icterus, normal conjunctivae       Chest/Lungs:   lungs are clear to auscultation, no rales or wheezing, no sternal scar, equal chest wall expansion    Cardiovascular:   Regular. Normal first and second heart sounds with no murmurs, rubs, or gallops; the carotid, radial and posterior tibial pulses are intact, Jugular venous pressure normal, no edema bilaterally        Extremities: no cyanosis or clubbing   Skin: no xanthelasma, warm.    Neurologic: normal  bilateral, no tremors     Psychiatric: alert and oriented x3, calm        Please refer above for cardiac ROS details.        Medical History  Surgical History Family History Social History   Past Medical History:   Diagnosis Date     Bleeding duodenal ulcer 2010     Chronic diarrhea 04/20/2017    Colonoscopy normal except inflammatory polyp or     Chronic left shoulder pain 9/26/2017     Chronic low back pain without sciatica 11/3/2017     Chronic pain      Chronic radicular low back pain     Pain clinic.  Nerve stimulator placement     Complete tear of left rotator cuff 4/3/2018     Duodenal ulcer     bleeding     Family history of breast cancer      GERD (gastroesophageal reflux disease)      H/O ulcer disease     secondary to medications     Herpes zoster 2010     History of transfusion      Hypercholesterolemia      LA (lupus anticoagulant) disorder (H)      Left hip pain 2015    bursitis, evaluated by orthopedics     Lichen sclerosus of female genitalia 6/16/2020     MRSA (methicillin resistant staph aureus) culture positive     betweeen 1996 and 2000     Oral lichen planus 4/20/2017     Plantar fasciitis      Raynaud's disease without gangrene 11/20/2018    Involving both feet, describing burning sensation, redness and coolness to touch     Rheumatoid arthritis (H)     Followed by rheumatology, Dr. Felipe,      Screening     DEXA normal T score -0.9 2016, minimal change compared to previous DEXA     VRE (vancomycin  resistant enterococcus) culture positive     between  and      Past Surgical History:   Procedure Laterality Date     ABDOMINAL HERNIA REPAIR       APPENDECTOMY       ARTHROSCOPY SHOULDER ROTATOR CUFF REPAIR Right      ARTHROSCOPY SHOULDER ROTATOR CUFF REPAIR Left 2018     BACK SURGERY       BIOPSY BREAST Left      COLONOSCOPY      Normal colonoscopy May 2017 except inflammatory polyp     ESOPHAGOSCOPY, GASTROSCOPY, DUODENOSCOPY (EGD), COMBINED N/A 2018    Procedure: ESOPHAGOGASTRODUODENOSCOPY (EGD) with biopsy and foreign body removal;  Surgeon: Raymond Triana MD;  Location: [facility];  Service: Gastroenterology     HC REMOVAL OF TONSILS,<13 Y/O      Description: Tonsillectomy;  Recorded: 2011;     HYSTERECTOMY      LUIS ANGEL with BSO     HYSTERECTOMY TOTAL ABDOMINAL, BILATERAL SALPINGO-OOPHORECTOMY, COMBINED       KNEE SURGERY Right 2007    Arthroscopic knee surgery     OPEN REDUCTION INTERNAL FIXATION ELBOW  2014     ME ARTHRODESIS ANT INTERBODY MIN DISCECTOMY,LUMBAR      Description: Lumbar Vertebral Fusion;  Recorded: 2011;  Comments: L5-S1 fusion     ME STEREOTACT STIM SPINAL CORD      Description: Spinal Stereotaxis Stimulation Of Cord;  Recorded: 2011;  Comments: implanted 2007 with lead revision      SKIN GRAFT       Family History   Problem Relation Age of Onset     Breast Cancer Mother 74.00     Cerebrovascular Disease Mother      Lung Cancer Father 70.00     Breast Cancer Maternal Aunt 70.00     Liver Cancer Brother 48.00        Social History     Socioeconomic History     Marital status:      Spouse name: Not on file     Number of children: Not on file     Years of education: Not on file     Highest education level: Not on file   Occupational History     Not on file   Tobacco Use     Smoking status: Former     Types: Cigarettes     Quit date: 1970     Years since quittin.8     Smokeless tobacco: Never   Vaping Use     Vaping Use: Never  used   Substance and Sexual Activity     Alcohol use: No     Drug use: No     Sexual activity: Not on file   Other Topics Concern     Not on file   Social History Narrative    Lives at home with her  , gardening , bikes , swims 4-5 miles walk a day        Social Determinants of Health     Financial Resource Strain: Not on file   Food Insecurity: Not on file   Transportation Needs: Not on file   Physical Activity: Not on file   Stress: Not on file   Social Connections: Not on file   Intimate Partner Violence: Not on file   Housing Stability: Not on file           Medications  Allergies   Current Outpatient Medications   Medication Sig Dispense Refill     aspirin 81 MG EC tablet Take 81 mg by mouth daily       atorvastatin (LIPITOR) 40 MG tablet Take 1 tablet (40 mg) by mouth daily 90 tablet 3     diclofenac sodium 3 % Gel [DICLOFENAC SODIUM 3 % GEL] 4 g knees three times aday  and 2 g on hands or lebows three times a day 3 Tube 3     estradiol (ESTRACE) 0.1 MG/GM vaginal cream Place 0.5 g vaginally daily 42.5 g 11     GOLIMUMAB (SIMPONI ARIA IV) [GOLIMUMAB (SIMPONI ARIA IV)] Infuse into a venous catheter every 2 (two) months.        hydroxychloroquine (PLAQUENIL) 200 mg tablet [HYDROXYCHLOROQUINE (PLAQUENIL) 200 MG TABLET] Take 400 mg by mouth daily.        isosorbide mononitrate (IMDUR) 30 MG 24 hr tablet Take 1 tablet (30 mg) by mouth daily 34 tablet 3     Lactobacillus rhamnosus GG (CULTURELLE) 10-15 Billion cell capsule [LACTOBACILLUS RHAMNOSUS GG (CULTURELLE) 10-15 BILLION CELL CAPSULE] Take 1 capsule by mouth daily.       multivitamin therapeutic (THERAGRAN) tablet [MULTIVITAMIN THERAPEUTIC (THERAGRAN) TABLET] Take 1 tablet by mouth daily.       nitroGLYcerin (NITROSTAT) 0.4 MG sublingual tablet For chest pain place 1 tablet under the tongue every 5 minutes for 3 doses. If symptoms persist 5 minutes after 1st dose call 911. 30 tablet 1     nystatin (MYCOSTATIN) 100,000 unit/mL suspension         omeprazole (PRILOSEC) 40 MG DR capsule Take 1 capsule (40 mg) by mouth daily 90 capsule 3     sulfaSALAzine (AZULFIDINE ENTAB) 500 MG EC tablet [SULFASALAZINE (AZULFIDINE ENTAB) 500 MG EC TABLET] 1,500 mg 2 (two) times a day.        celecoxib (CELEBREX) 200 MG capsule Take 1 capsule (200 mg) by mouth 2 times daily (Patient not taking: Reported on 3/16/2023) 180 capsule 3       Allergies   Allergen Reactions     Codeine Unknown     nausea     Gabapentin Unknown     Fatigue       Pregabalin Unknown     Fatigue            Lab Results    Chemistry/lipid CBC Cardiac Enzymes/BNP/TSH/INR   Recent Labs   Lab Test 02/16/23  1229   CHOL 220*   HDL 96   *   TRIG 80     Recent Labs   Lab Test 02/16/23  1229 09/13/22  1028 06/24/21  0943   * 158* 147*     Recent Labs   Lab Test 02/16/23  1229      POTASSIUM 4.5   CHLORIDE 104   CO2 25   *   BUN 23.9*   CR 0.91   GFRESTIMATED 67   LULU 10.0     Recent Labs   Lab Test 02/16/23  1229 06/24/21  0943   CR 0.91 0.92     Recent Labs   Lab Test 02/16/23  1229 09/13/22  1028 06/24/21  0943   A1C 5.8* 5.8* 5.7*          Recent Labs   Lab Test 06/24/21  0943   WBC 3.6*   HGB 14.0   HCT 42.9           Recent Labs   Lab Test 06/24/21  0943 04/03/18  1422   HGB 14.0 14.0    No results for input(s): TROPONINI in the last 43933 hours.  No results for input(s): BNP, NTBNPI, NTBNP in the last 72035 hours.  Recent Labs   Lab Test 09/13/22  1028   TSH 2.23     No results for input(s): INR in the last 31020 hours.     Germán Trevino DO    Today's clinic visit entailed:  61 minutes spent on the date of the encounter doing chart review, history and exam, documentation and further activities per the note

## 2023-03-16 NOTE — H&P (VIEW-ONLY)
HEART CARE ENCOUNTER CONSULTATON NOTE      [facility]  [phone]      Assessment/Recommendations   Assessment:   1.  Anginal chest pain, progressive angina  2.  Abnormal stress test, abnormal ECG portion of stress test associate with chest pain during exercise  3.  Abnormal CT coronary angiogram with evidence of obstructive coronary disease  4.  Rheumatoid arthritis    Plan:   1.  Recommend coronary angiogram poss percutaneous coronary intervention.  Discussed procedure in detail.  Indication for coronary angiogram is progressive angina with abnormal treadmill ECG stress test.  2.  Aspirin 81 mg daily  3.  Continue Imdur 30 mg daily at this time.  Can discontinue after interim if revascularization completed  4.  Continue atorvastatin  5.  Agree with discontinuation of Celebrex       History of Present Illness/Subjective    HPI: Antonia Everett is a 72 year old female rheumatoid arthritis who presents to cardiology clinic in consultation for anginal chest pain.    Past 2 months during her walks has been noticing a pressure and pain in her chest.  Pain is progressed now to intermittent chest pain at rest.  She states when doing her swimming routine or walking she developed a pressure in her chest.  Typically if she rests the symptoms improved within 5 minutes.  Longest episode of chest pain was 20 minutes.  More recently she been noticing chest discomfort at night and waking her from her sleep.  Given the symptoms she underwent treadmill nuclear stress testing which EKG portion was abnormal.  She also developed chest pain on the treadmill.  Given these findings she underwent a coronary CT angiogram which confirmed obstructive coronary disease based on FFR analysis.    Long discussion with the patient regarding recent testing.  Discussed treatment options including medical management versus revascularization options.  Given she is a very active 72-year-old would recommend coronary angiogram  "with poss percutaneous coronary mention and revascularization.  She did note some improvement with use of Imdur but continues to have symptoms.    ECG treadmill Stress: Personally reviewed EKG stress test.  Patient had significant ST segment depression in multiple leads with exercise.  She had 2 to 3 mm ST depression which was horizontal to downsloping and improved after 3 to 5 minutes of rest.  Patient did have chest pain with exercise during stress testing.    Coronary CT angiogram: 2023  There is diffuse plaque in the proximal to mid left anterior descending with mild to moderate luminal stenosis. FFR analysis in the left anterior descending and major diagonal branches suggests low likelihood of lesion specific ischemia.  There is diffuse plaque in the right coronary artery with FFR analysis suggesting low likelihood of lesion specific ischemia.  FFR analysis in both the moderate-sized posterior descending and posterior lateral branches suggests higher likelihood of lesion specific ischemia and suggests the possibility of significant flow-limiting stenosis in both branches. Clinical correlation is recommended..  Please see separate report for radiology for additional findings.      Echocardiogram Results: 3/13/2023  Left ventricular size, wall motion and function are normal. The ejection  fraction is 60-65%.  Normal right ventricle size and systolic function.  No hemodynamically significant valvular abnormalities on 2D or color flow  imaging.       Physical Examination  Review of Systems   Vitals: /78 (BP Location: Right arm, Patient Position: Sitting, Cuff Size: Adult Regular)   Pulse 88   Resp 16   Ht 1.727 m (5' 8\")   Wt 81.1 kg (178 lb 12.8 oz)   BMI 27.19 kg/m    BMI= Body mass index is 27.19 kg/m .  Wt Readings from Last 3 Encounters:   03/16/23 81.1 kg (178 lb 12.8 oz)   03/08/23 77.1 kg (170 lb)   02/16/23 81.6 kg (180 lb)        Pleasant   ENT/Mouth: membranes moist, no oral lesions or " bleeding gums.      EYES:  no scleral icterus, normal conjunctivae       Chest/Lungs:   lungs are clear to auscultation, no rales or wheezing, no sternal scar, equal chest wall expansion    Cardiovascular:   Regular. Normal first and second heart sounds with no murmurs, rubs, or gallops; the carotid, radial and posterior tibial pulses are intact, Jugular venous pressure normal, no edema bilaterally        Extremities: no cyanosis or clubbing   Skin: no xanthelasma, warm.    Neurologic: normal  bilateral, no tremors     Psychiatric: alert and oriented x3, calm        Please refer above for cardiac ROS details.        Medical History  Surgical History Family History Social History   Past Medical History:   Diagnosis Date     Bleeding duodenal ulcer 2010     Chronic diarrhea 04/20/2017    Colonoscopy normal except inflammatory polyp or     Chronic left shoulder pain 9/26/2017     Chronic low back pain without sciatica 11/3/2017     Chronic pain      Chronic radicular low back pain     Pain clinic.  Nerve stimulator placement     Complete tear of left rotator cuff 4/3/2018     Duodenal ulcer     bleeding     Family history of breast cancer      GERD (gastroesophageal reflux disease)      H/O ulcer disease     secondary to medications     Herpes zoster 2010     History of transfusion      Hypercholesterolemia      LA (lupus anticoagulant) disorder (H)      Left hip pain 2015    bursitis, evaluated by orthopedics     Lichen sclerosus of female genitalia 6/16/2020     MRSA (methicillin resistant staph aureus) culture positive     betweeen 1996 and 2000     Oral lichen planus 4/20/2017     Plantar fasciitis      Raynaud's disease without gangrene 11/20/2018    Involving both feet, describing burning sensation, redness and coolness to touch     Rheumatoid arthritis (H)     Followed by rheumatology, Dr. Felipe,      Screening     DEXA normal T score -0.9 2016, minimal change compared to previous DEXA     VRE (vancomycin  resistant enterococcus) culture positive     between  and      Past Surgical History:   Procedure Laterality Date     ABDOMINAL HERNIA REPAIR       APPENDECTOMY       ARTHROSCOPY SHOULDER ROTATOR CUFF REPAIR Right      ARTHROSCOPY SHOULDER ROTATOR CUFF REPAIR Left 2018     BACK SURGERY       BIOPSY BREAST Left      COLONOSCOPY      Normal colonoscopy May 2017 except inflammatory polyp     ESOPHAGOSCOPY, GASTROSCOPY, DUODENOSCOPY (EGD), COMBINED N/A 2018    Procedure: ESOPHAGOGASTRODUODENOSCOPY (EGD) with biopsy and foreign body removal;  Surgeon: Raymond Triana MD;  Location: [facility];  Service: Gastroenterology     HC REMOVAL OF TONSILS,<13 Y/O      Description: Tonsillectomy;  Recorded: 2011;     HYSTERECTOMY      LUIS ANGEL with BSO     HYSTERECTOMY TOTAL ABDOMINAL, BILATERAL SALPINGO-OOPHORECTOMY, COMBINED       KNEE SURGERY Right 2007    Arthroscopic knee surgery     OPEN REDUCTION INTERNAL FIXATION ELBOW  2014     IL ARTHRODESIS ANT INTERBODY MIN DISCECTOMY,LUMBAR      Description: Lumbar Vertebral Fusion;  Recorded: 2011;  Comments: L5-S1 fusion     IL STEREOTACT STIM SPINAL CORD      Description: Spinal Stereotaxis Stimulation Of Cord;  Recorded: 2011;  Comments: implanted 2007 with lead revision      SKIN GRAFT       Family History   Problem Relation Age of Onset     Breast Cancer Mother 74.00     Cerebrovascular Disease Mother      Lung Cancer Father 70.00     Breast Cancer Maternal Aunt 70.00     Liver Cancer Brother 48.00        Social History     Socioeconomic History     Marital status:      Spouse name: Not on file     Number of children: Not on file     Years of education: Not on file     Highest education level: Not on file   Occupational History     Not on file   Tobacco Use     Smoking status: Former     Types: Cigarettes     Quit date: 1970     Years since quittin.8     Smokeless tobacco: Never   Vaping Use     Vaping Use: Never  used   Substance and Sexual Activity     Alcohol use: No     Drug use: No     Sexual activity: Not on file   Other Topics Concern     Not on file   Social History Narrative    Lives at home with her  , gardening , bikes , swims 4-5 miles walk a day        Social Determinants of Health     Financial Resource Strain: Not on file   Food Insecurity: Not on file   Transportation Needs: Not on file   Physical Activity: Not on file   Stress: Not on file   Social Connections: Not on file   Intimate Partner Violence: Not on file   Housing Stability: Not on file           Medications  Allergies   Current Outpatient Medications   Medication Sig Dispense Refill     aspirin 81 MG EC tablet Take 81 mg by mouth daily       atorvastatin (LIPITOR) 40 MG tablet Take 1 tablet (40 mg) by mouth daily 90 tablet 3     diclofenac sodium 3 % Gel [DICLOFENAC SODIUM 3 % GEL] 4 g knees three times aday  and 2 g on hands or lebows three times a day 3 Tube 3     estradiol (ESTRACE) 0.1 MG/GM vaginal cream Place 0.5 g vaginally daily 42.5 g 11     GOLIMUMAB (SIMPONI ARIA IV) [GOLIMUMAB (SIMPONI ARIA IV)] Infuse into a venous catheter every 2 (two) months.        hydroxychloroquine (PLAQUENIL) 200 mg tablet [HYDROXYCHLOROQUINE (PLAQUENIL) 200 MG TABLET] Take 400 mg by mouth daily.        isosorbide mononitrate (IMDUR) 30 MG 24 hr tablet Take 1 tablet (30 mg) by mouth daily 34 tablet 3     Lactobacillus rhamnosus GG (CULTURELLE) 10-15 Billion cell capsule [LACTOBACILLUS RHAMNOSUS GG (CULTURELLE) 10-15 BILLION CELL CAPSULE] Take 1 capsule by mouth daily.       multivitamin therapeutic (THERAGRAN) tablet [MULTIVITAMIN THERAPEUTIC (THERAGRAN) TABLET] Take 1 tablet by mouth daily.       nitroGLYcerin (NITROSTAT) 0.4 MG sublingual tablet For chest pain place 1 tablet under the tongue every 5 minutes for 3 doses. If symptoms persist 5 minutes after 1st dose call 911. 30 tablet 1     nystatin (MYCOSTATIN) 100,000 unit/mL suspension         omeprazole (PRILOSEC) 40 MG DR capsule Take 1 capsule (40 mg) by mouth daily 90 capsule 3     sulfaSALAzine (AZULFIDINE ENTAB) 500 MG EC tablet [SULFASALAZINE (AZULFIDINE ENTAB) 500 MG EC TABLET] 1,500 mg 2 (two) times a day.        celecoxib (CELEBREX) 200 MG capsule Take 1 capsule (200 mg) by mouth 2 times daily (Patient not taking: Reported on 3/16/2023) 180 capsule 3       Allergies   Allergen Reactions     Codeine Unknown     nausea     Gabapentin Unknown     Fatigue       Pregabalin Unknown     Fatigue            Lab Results    Chemistry/lipid CBC Cardiac Enzymes/BNP/TSH/INR   Recent Labs   Lab Test 02/16/23  1229   CHOL 220*   HDL 96   *   TRIG 80     Recent Labs   Lab Test 02/16/23  1229 09/13/22  1028 06/24/21  0943   * 158* 147*     Recent Labs   Lab Test 02/16/23  1229      POTASSIUM 4.5   CHLORIDE 104   CO2 25   *   BUN 23.9*   CR 0.91   GFRESTIMATED 67   LULU 10.0     Recent Labs   Lab Test 02/16/23  1229 06/24/21  0943   CR 0.91 0.92     Recent Labs   Lab Test 02/16/23  1229 09/13/22  1028 06/24/21  0943   A1C 5.8* 5.8* 5.7*          Recent Labs   Lab Test 06/24/21  0943   WBC 3.6*   HGB 14.0   HCT 42.9           Recent Labs   Lab Test 06/24/21  0943 04/03/18  1422   HGB 14.0 14.0    No results for input(s): TROPONINI in the last 18715 hours.  No results for input(s): BNP, NTBNPI, NTBNP in the last 87426 hours.  Recent Labs   Lab Test 09/13/22  1028   TSH 2.23     No results for input(s): INR in the last 04545 hours.     Germán Trevino DO    Today's clinic visit entailed:  61 minutes spent on the date of the encounter doing chart review, history and exam, documentation and further activities per the note

## 2023-03-21 LAB
ABO/RH(D): NORMAL
ANTIBODY SCREEN: NEGATIVE
SPECIMEN EXPIRATION DATE: NORMAL

## 2023-03-22 ENCOUNTER — TELEPHONE (OUTPATIENT)
Dept: CARDIOLOGY | Facility: CLINIC | Age: 73
End: 2023-03-22

## 2023-03-22 ENCOUNTER — LAB (OUTPATIENT)
Dept: CARDIOLOGY | Facility: CLINIC | Age: 73
End: 2023-03-22
Payer: MEDICARE

## 2023-03-22 DIAGNOSIS — R94.31 ABNORMAL ELECTROCARDIOGRAM (ECG) (EKG): ICD-10-CM

## 2023-03-22 DIAGNOSIS — R94.39 ABNORMAL CARDIOVASCULAR STRESS TEST: ICD-10-CM

## 2023-03-22 DIAGNOSIS — I20.89 ANGINA AT REST (H): ICD-10-CM

## 2023-03-22 LAB
ANION GAP SERPL CALCULATED.3IONS-SCNC: 8 MMOL/L (ref 5–18)
BUN SERPL-MCNC: 21 MG/DL (ref 8–28)
CALCIUM SERPL-MCNC: 9.7 MG/DL (ref 8.5–10.5)
CHLORIDE BLD-SCNC: 109 MMOL/L (ref 98–107)
CO2 SERPL-SCNC: 26 MMOL/L (ref 22–31)
CREAT SERPL-MCNC: 0.84 MG/DL (ref 0.6–1.1)
ERYTHROCYTE [DISTWIDTH] IN BLOOD BY AUTOMATED COUNT: 12.5 % (ref 10–15)
GFR SERPL CREATININE-BSD FRML MDRD: 74 ML/MIN/1.73M2
GLUCOSE BLD-MCNC: 97 MG/DL (ref 70–125)
HCT VFR BLD AUTO: 40.8 % (ref 35–47)
HGB BLD-MCNC: 13.1 G/DL (ref 11.7–15.7)
MCH RBC QN AUTO: 31.9 PG (ref 26.5–33)
MCHC RBC AUTO-ENTMCNC: 32.1 G/DL (ref 31.5–36.5)
MCV RBC AUTO: 99 FL (ref 78–100)
PLATELET # BLD AUTO: 233 10E3/UL (ref 150–450)
POTASSIUM BLD-SCNC: 4.5 MMOL/L (ref 3.5–5)
RBC # BLD AUTO: 4.11 10E6/UL (ref 3.8–5.2)
SODIUM SERPL-SCNC: 143 MMOL/L (ref 136–145)
WBC # BLD AUTO: 3.5 10E3/UL (ref 4–11)

## 2023-03-22 PROCEDURE — 85027 COMPLETE CBC AUTOMATED: CPT

## 2023-03-22 PROCEDURE — 86901 BLOOD TYPING SEROLOGIC RH(D): CPT

## 2023-03-22 PROCEDURE — 80048 BASIC METABOLIC PNL TOTAL CA: CPT

## 2023-03-22 PROCEDURE — 86900 BLOOD TYPING SEROLOGIC ABO: CPT

## 2023-03-22 PROCEDURE — 36415 COLL VENOUS BLD VENIPUNCTURE: CPT

## 2023-03-22 PROCEDURE — 86850 RBC ANTIBODY SCREEN: CPT

## 2023-03-22 NOTE — TELEPHONE ENCOUNTER
PC with patient and review. Apologetic for writers' misinformation of date to arrive. Discussion of procedure tomorrow, and patient agreeable to proceed. Her  will be with her/ride post-procedure. Again, writer took ownership of date miscommunication. Staff will expect her tomorrow for 0630 arrival. Will call [facility] [place] inform of [place] agreeable to proceed.   Orders are in place. Will update date on prep/education documentation. CMM,RN

## 2023-03-22 NOTE — TELEPHONE ENCOUNTER
----- Message from Elizabeth Watson RN sent at 3/22/2023 10:19 AM CDT -----  Regarding: Angiogram date  On your pre education, Angiogram is scheduled for Friday.  This is what the patient was told.  She is scheduled for tomorrow on our schedule.  She is fine with Thursday.  Just need it confirmed.  Please call me when you get this message and call the patient 1st.  Thank you Elizabeth [phone]

## 2023-03-22 NOTE — TELEPHONE ENCOUNTER
Date of procedure documented as 3/24. Procedure arranged for 3/23. Pt called and updated on correct date of arrival and agreeable to continue with procedure. [email or web address]

## 2023-03-23 ENCOUNTER — APPOINTMENT (OUTPATIENT)
Dept: CARDIOLOGY | Facility: HOSPITAL | Age: 73
DRG: 907 | End: 2023-03-23
Attending: INTERNAL MEDICINE
Payer: MEDICARE

## 2023-03-23 ENCOUNTER — ANESTHESIA EVENT (OUTPATIENT)
Dept: CARDIOLOGY | Facility: HOSPITAL | Age: 73
DRG: 907 | End: 2023-03-23
Payer: MEDICARE

## 2023-03-23 ENCOUNTER — ANESTHESIA (OUTPATIENT)
Dept: CARDIOLOGY | Facility: HOSPITAL | Age: 73
DRG: 907 | End: 2023-03-23
Payer: MEDICARE

## 2023-03-23 ENCOUNTER — ANESTHESIA EVENT (OUTPATIENT)
Dept: SURGERY | Facility: HOSPITAL | Age: 73
DRG: 907 | End: 2023-03-23
Payer: MEDICARE

## 2023-03-23 ENCOUNTER — ANESTHESIA (OUTPATIENT)
Dept: SURGERY | Facility: HOSPITAL | Age: 73
DRG: 907 | End: 2023-03-23
Payer: MEDICARE

## 2023-03-23 ENCOUNTER — APPOINTMENT (OUTPATIENT)
Dept: RADIOLOGY | Facility: HOSPITAL | Age: 73
DRG: 907 | End: 2023-03-23
Attending: PHYSICIAN ASSISTANT
Payer: MEDICARE

## 2023-03-23 ENCOUNTER — HOSPITAL ENCOUNTER (INPATIENT)
Facility: HOSPITAL | Age: 73
LOS: 4 days | Discharge: HOME | DRG: 907 | End: 2023-03-27
Attending: INTERNAL MEDICINE | Admitting: INTERNAL MEDICINE
Payer: MEDICARE

## 2023-03-23 DIAGNOSIS — Z95.1 S/P CABG (CORONARY ARTERY BYPASS GRAFT): Primary | ICD-10-CM

## 2023-03-23 DIAGNOSIS — I20.0 UNSTABLE ANGINA PECTORIS (H): ICD-10-CM

## 2023-03-23 DIAGNOSIS — R94.39 POSITIVE CARDIAC STRESS TEST: ICD-10-CM

## 2023-03-23 DIAGNOSIS — I25.10 CORONARY ARTERY DISEASE INVOLVING NATIVE HEART, UNSPECIFIED VESSEL OR LESION TYPE, UNSPECIFIED WHETHER ANGINA PRESENT: ICD-10-CM

## 2023-03-23 DIAGNOSIS — R94.39 ABNORMAL CARDIOVASCULAR STRESS TEST: ICD-10-CM

## 2023-03-23 DIAGNOSIS — I25.10 CORONARY ARTERY DISEASE INVOLVING NATIVE HEART WITHOUT ANGINA PECTORIS, UNSPECIFIED VESSEL OR LESION TYPE: ICD-10-CM

## 2023-03-23 PROBLEM — Z98.61 PERCUTANEOUS TRANSLUMINAL CORONARY ANGIOPLASTY STATUS: Status: ACTIVE | Noted: 2023-03-23

## 2023-03-23 PROBLEM — I21.9: Status: ACTIVE | Noted: 2023-03-23

## 2023-03-23 LAB
ACT BLD: 148 SECONDS (ref 74–150)
ACT BLD: 195 SECONDS (ref 74–150)
ACT BLD: 216 SECONDS (ref 74–150)
ACT BLD: 233 SECONDS (ref 74–150)
ACT BLD: 241 SECONDS (ref 74–150)
ACT BLD: 246 SECONDS (ref 74–150)
ACT BLD: 254 SECONDS (ref 74–150)
ACT BLD: 275 SECONDS (ref 74–150)
ACT BLD: 544 SECONDS (ref 74–150)
ALBUMIN SERPL BCG-MCNC: 3.5 G/DL (ref 3.5–5.2)
ALP SERPL-CCNC: 23 U/L (ref 35–104)
ALT SERPL W P-5'-P-CCNC: 27 U/L (ref 10–35)
ANION GAP SERPL CALCULATED.3IONS-SCNC: 8 MMOL/L (ref 7–15)
APTT PPP: 46 SECONDS (ref 22–38)
APTT PPP: 99 SECONDS (ref 22–38)
APTT PPP: >300 SECONDS (ref 22–38)
AST SERPL W P-5'-P-CCNC: 78 U/L (ref 10–35)
ATRIAL RATE - MUSE: 72 BPM
BASE EXCESS BLDA CALC-SCNC: -0.9 MMOL/L
BASE EXCESS BLDA CALC-SCNC: -0.9 MMOL/L
BASE EXCESS BLDA CALC-SCNC: -10.5 MMOL/L
BASE EXCESS BLDA CALC-SCNC: -2.6 MMOL/L
BASE EXCESS BLDA CALC-SCNC: -3.1 MMOL/L
BASE EXCESS BLDA CALC-SCNC: 0.6 MMOL/L
BASE EXCESS BLDA CALC-SCNC: 0.7 MMOL/L
BASE EXCESS BLDV CALC-SCNC: 1 MMOL/L
BASE EXCESS BLDV CALC-SCNC: 1.5 MMOL/L
BILIRUB SERPL-MCNC: 0.4 MG/DL
BLD PROD TYP BPU: NORMAL
BLOOD COMPONENT TYPE: NORMAL
BUN SERPL-MCNC: 13.6 MG/DL (ref 8–23)
CA-I BLD-MCNC: 0.93 MMOL/L (ref 1.11–1.3)
CA-I BLD-MCNC: 0.94 MMOL/L (ref 1.11–1.3)
CA-I BLD-MCNC: 1.04 MMOL/L (ref 1.11–1.3)
CA-I BLD-MCNC: 1.11 MMOL/L (ref 1.11–1.3)
CA-I BLD-MCNC: 1.19 MMOL/L (ref 1.11–1.3)
CALCIUM SERPL-MCNC: 8.3 MG/DL (ref 8.8–10.2)
CALCIUM, IONIZED MEASURED: 1.16 MMOL/L (ref 1.11–1.3)
CHLORIDE SERPL-SCNC: 112 MMOL/L (ref 98–107)
CODING SYSTEM: NORMAL
COHGB MFR BLD: 100 % (ref 95–96)
COHGB MFR BLD: 98.7 % (ref 95–96)
COHGB MFR BLD: 99.6 % (ref 95–96)
COHGB MFR BLD: 99.9 % (ref 95–96)
CREAT SERPL-MCNC: 0.77 MG/DL (ref 0.51–0.95)
CROSSMATCH: NORMAL
DEPRECATED HCO3 PLAS-SCNC: 23 MMOL/L (ref 22–29)
DIASTOLIC BLOOD PRESSURE - MUSE: NORMAL MMHG
ERYTHROCYTE [DISTWIDTH] IN BLOOD BY AUTOMATED COUNT: 12.3 % (ref 10–15)
ERYTHROCYTE [DISTWIDTH] IN BLOOD BY AUTOMATED COUNT: 12.4 % (ref 10–15)
ERYTHROCYTE [DISTWIDTH] IN BLOOD BY AUTOMATED COUNT: 12.5 % (ref 10–15)
FIBRINOGEN PPP-MCNC: 207 MG/DL (ref 170–490)
FIBRINOGEN PPP-MCNC: 256 MG/DL (ref 170–490)
GFR SERPL CREATININE-BSD FRML MDRD: 82 ML/MIN/1.73M2
GLUCOSE BLD-MCNC: 131 MG/DL (ref 70–125)
GLUCOSE BLD-MCNC: 145 MG/DL (ref 70–125)
GLUCOSE BLD-MCNC: 154 MG/DL (ref 70–125)
GLUCOSE BLD-MCNC: 156 MG/DL (ref 70–125)
GLUCOSE BLD-MCNC: 158 MG/DL (ref 70–125)
GLUCOSE BLDC GLUCOMTR-MCNC: 134 MG/DL (ref 70–99)
GLUCOSE BLDC GLUCOMTR-MCNC: 135 MG/DL (ref 70–99)
GLUCOSE BLDC GLUCOMTR-MCNC: 137 MG/DL (ref 70–99)
GLUCOSE BLDC GLUCOMTR-MCNC: 138 MG/DL (ref 70–99)
GLUCOSE BLDC GLUCOMTR-MCNC: 142 MG/DL (ref 70–99)
GLUCOSE BLDC GLUCOMTR-MCNC: 142 MG/DL (ref 70–99)
GLUCOSE BLDC GLUCOMTR-MCNC: 144 MG/DL (ref 70–99)
GLUCOSE BLDC GLUCOMTR-MCNC: 156 MG/DL (ref 70–99)
GLUCOSE SERPL-MCNC: 152 MG/DL (ref 70–99)
HCO3 BLD-SCNC: 24 MMOL/L (ref 23–29)
HCO3 BLD-SCNC: 25 MMOL/L (ref 23–29)
HCO3 BLDA-SCNC: 17 MMOL/L (ref 23–29)
HCO3 BLDA-SCNC: 22 MMOL/L (ref 23–29)
HCO3 BLDA-SCNC: 24 MMOL/L (ref 23–29)
HCO3 BLDA-SCNC: 24 MMOL/L (ref 23–29)
HCO3 BLDA-SCNC: 25 MMOL/L (ref 23–29)
HCO3 BLDV-SCNC: 25 MMOL/L (ref 24–30)
HCO3 BLDV-SCNC: 25 MMOL/L (ref 24–30)
HCT VFR BLD AUTO: 27.2 % (ref 35–47)
HCT VFR BLD AUTO: 30 % (ref 35–47)
HCT VFR BLD AUTO: 32.2 % (ref 35–47)
HGB BLD-MCNC: 10.6 G/DL (ref 11.7–15.7)
HGB BLD-MCNC: 10.6 G/DL (ref 11.7–15.7)
HGB BLD-MCNC: 11 G/DL (ref 11.7–15.7)
HGB BLD-MCNC: 8.5 G/DL (ref 11.7–15.7)
HGB BLD-MCNC: 8.6 G/DL (ref 11.7–15.7)
HGB BLD-MCNC: 8.9 G/DL (ref 11.7–15.7)
HGB BLD-MCNC: 9.2 G/DL (ref 11.7–15.7)
HGB BLD-MCNC: 9.9 G/DL (ref 11.7–15.7)
INR PPP: 0.91 (ref 0.85–1.15)
INR PPP: 1.35 (ref 0.85–1.15)
INR PPP: 1.64 (ref 0.85–1.15)
INTERPRETATION ECG - MUSE: NORMAL
ION CA PH 7.4: 1.11 MMOL/L (ref 1.11–1.3)
ISSUE DATE AND TIME: NORMAL
LACTATE BLD-SCNC: 0.9 MMOL/L (ref 0.7–2)
LACTATE BLD-SCNC: 0.9 MMOL/L (ref 0.7–2)
LACTATE BLD-SCNC: 1 MMOL/L (ref 0.7–2)
LACTATE BLD-SCNC: 1 MMOL/L (ref 0.7–2)
LACTATE BLD-SCNC: 1.3 MMOL/L (ref 0.7–2)
LACTATE SERPL-SCNC: 2.1 MMOL/L (ref 0.7–2)
MAGNESIUM SERPL-MCNC: 2.2 MG/DL (ref 1.7–2.3)
MCH RBC QN AUTO: 31.8 PG (ref 26.5–33)
MCH RBC QN AUTO: 32 PG (ref 26.5–33)
MCH RBC QN AUTO: 32.4 PG (ref 26.5–33)
MCHC RBC AUTO-ENTMCNC: 32.7 G/DL (ref 31.5–36.5)
MCHC RBC AUTO-ENTMCNC: 32.9 G/DL (ref 31.5–36.5)
MCHC RBC AUTO-ENTMCNC: 33 G/DL (ref 31.5–36.5)
MCV RBC AUTO: 97 FL (ref 78–100)
MCV RBC AUTO: 98 FL (ref 78–100)
MCV RBC AUTO: 98 FL (ref 78–100)
OXYHGB MFR BLD: >98.5 % (ref 95–96)
OXYHGB MFR BLD: >98.5 % (ref 95–96)
P AXIS - MUSE: 27 DEGREES
PCO2 BLD: 35 MM HG (ref 35–45)
PCO2 BLD: 46 MM HG (ref 35–45)
PCO2 BLDA: 35 MM HG (ref 35–45)
PCO2 BLDA: 36 MM HG (ref 35–45)
PCO2 BLDA: 36 MM HG (ref 35–45)
PCO2 BLDA: 38 MM HG (ref 35–45)
PCO2 BLDA: 38 MM HG (ref 35–45)
PCO2 BLDV: 45 MM HG (ref 35–50)
PCO2 BLDV: 45 MM HG (ref 35–50)
PH BLD: 7.32 [PH] (ref 7.37–7.44)
PH BLD: 7.46 [PH] (ref 7.37–7.44)
PH BLDA: 7.28 [PH] (ref 7.37–7.44)
PH BLDA: 7.39 [PH] (ref 7.37–7.44)
PH BLDA: 7.4 [PH] (ref 7.37–7.44)
PH BLDA: 7.42 [PH] (ref 7.37–7.44)
PH BLDA: 7.43 [PH] (ref 7.37–7.44)
PH BLDV: 7.37 [PH] (ref 7.35–7.45)
PH BLDV: 7.38 [PH] (ref 7.35–7.45)
PH: 7.32 (ref 7.35–7.45)
PHOSPHATE SERPL-MCNC: 3.4 MG/DL (ref 2.5–4.5)
PLATELET # BLD AUTO: 149 10E3/UL (ref 150–450)
PLATELET # BLD AUTO: 152 10E3/UL (ref 150–450)
PLATELET # BLD AUTO: 182 10E3/UL (ref 150–450)
PO2 BLD: 189 MM HG (ref 75–85)
PO2 BLD: 248 MM HG (ref 75–85)
PO2 BLDA: 115 MM HG (ref 75–85)
PO2 BLDA: 346 MM HG (ref 75–85)
PO2 BLDA: 371 MM HG (ref 75–85)
PO2 BLDA: 410 MM HG (ref 75–85)
PO2 BLDA: 425 MM HG (ref 75–85)
PO2 BLDV: 39 MM HG (ref 25–47)
PO2 BLDV: 49 MM HG (ref 25–47)
POTASSIUM BLD-SCNC: 3.8 MMOL/L (ref 3.5–5)
POTASSIUM BLD-SCNC: 3.8 MMOL/L (ref 3.5–5)
POTASSIUM BLD-SCNC: 4 MMOL/L (ref 3.5–5)
POTASSIUM BLD-SCNC: 4.5 MMOL/L (ref 3.5–5)
POTASSIUM BLD-SCNC: 5 MMOL/L (ref 3.5–5)
POTASSIUM SERPL-SCNC: 3.8 MMOL/L (ref 3.4–5.3)
POTASSIUM SERPL-SCNC: 3.8 MMOL/L (ref 3.4–5.3)
PR INTERVAL - MUSE: 158 MS
PROT SERPL-MCNC: 5.1 G/DL (ref 6.4–8.3)
QRS DURATION - MUSE: 92 MS
QT - MUSE: 408 MS
QTC - MUSE: 446 MS
R AXIS - MUSE: 33 DEGREES
RBC # BLD AUTO: 2.78 10E6/UL (ref 3.8–5.2)
RBC # BLD AUTO: 3.06 10E6/UL (ref 3.8–5.2)
RBC # BLD AUTO: 3.33 10E6/UL (ref 3.8–5.2)
SATV LHE POCT: 71.2 % (ref 70–75)
SATV LHE POCT: 83.6 % (ref 70–75)
SODIUM BLD-SCNC: 139 MMOL/L (ref 136–145)
SODIUM BLD-SCNC: 141 MMOL/L (ref 136–145)
SODIUM BLD-SCNC: 141 MMOL/L (ref 136–145)
SODIUM BLD-SCNC: 143 MMOL/L (ref 136–145)
SODIUM BLD-SCNC: 144 MMOL/L (ref 136–145)
SODIUM SERPL-SCNC: 143 MMOL/L (ref 136–145)
SYSTOLIC BLOOD PRESSURE - MUSE: NORMAL MMHG
T AXIS - MUSE: 41 DEGREES
TEMPERATURE: 37 DEGREES C
TEMPERATURE: 37 DEGREES C
UNIT ABO/RH: NORMAL
UNIT NUMBER: NORMAL
UNIT STATUS: NORMAL
UNIT TYPE ISBT: 7300
VENTRICULAR RATE- MUSE: 72 BPM
WBC # BLD AUTO: 15.6 10E3/UL (ref 4–11)
WBC # BLD AUTO: 17.6 10E3/UL (ref 4–11)
WBC # BLD AUTO: 8 10E3/UL (ref 4–11)

## 2023-03-23 PROCEDURE — 33510 CABG VEIN SINGLE: CPT | Performed by: THORACIC SURGERY (CARDIOTHORACIC VASCULAR SURGERY)

## 2023-03-23 PROCEDURE — 93010 ELECTROCARDIOGRAM REPORT: CPT | Mod: HIP | Performed by: INTERNAL MEDICINE

## 2023-03-23 PROCEDURE — C1769 GUIDE WIRE: HCPCS | Performed by: INTERNAL MEDICINE

## 2023-03-23 PROCEDURE — 86923 COMPATIBILITY TEST ELECTRIC: CPT | Performed by: THORACIC SURGERY (CARDIOTHORACIC VASCULAR SURGERY)

## 2023-03-23 PROCEDURE — C1887 CATHETER, GUIDING: HCPCS | Performed by: INTERNAL MEDICINE

## 2023-03-23 PROCEDURE — 4A023N7 MEASUREMENT OF CARDIAC SAMPLING AND PRESSURE, LEFT HEART, PERCUTANEOUS APPROACH: ICD-10-PCS | Performed by: INTERNAL MEDICINE

## 2023-03-23 PROCEDURE — 82805 BLOOD GASES W/O2 SATURATION: CPT | Performed by: INTERNAL MEDICINE

## 2023-03-23 PROCEDURE — 360N000079 HC SURGERY LEVEL 6, PER MIN: Performed by: THORACIC SURGERY (CARDIOTHORACIC VASCULAR SURGERY)

## 2023-03-23 PROCEDURE — P9045 ALBUMIN (HUMAN), 5%, 250 ML: HCPCS | Performed by: NURSE ANESTHETIST, CERTIFIED REGISTERED

## 2023-03-23 PROCEDURE — C1898 LEAD, PMKR, OTHER THAN TRANS: HCPCS | Performed by: THORACIC SURGERY (CARDIOTHORACIC VASCULAR SURGERY)

## 2023-03-23 PROCEDURE — 250N000013 HC RX MED GY IP 250 OP 250 PS 637: Performed by: INTERNAL MEDICINE

## 2023-03-23 PROCEDURE — 272N000004 HC RX 272: Performed by: THORACIC SURGERY (CARDIOTHORACIC VASCULAR SURGERY)

## 2023-03-23 PROCEDURE — 999N000157 HC STATISTIC RCP TIME EA 10 MIN

## 2023-03-23 PROCEDURE — 83605 ASSAY OF LACTIC ACID: CPT | Performed by: PHYSICIAN ASSISTANT

## 2023-03-23 PROCEDURE — 250N000009 HC RX 250

## 2023-03-23 PROCEDURE — C1713 ANCHOR/SCREW BN/BN,TIS/BN: HCPCS | Performed by: THORACIC SURGERY (CARDIOTHORACIC VASCULAR SURGERY)

## 2023-03-23 PROCEDURE — 999N000065 XR CHEST PORT 1 VIEW

## 2023-03-23 PROCEDURE — 5A02210 ASSISTANCE WITH CARDIAC OUTPUT USING BALLOON PUMP, CONTINUOUS: ICD-10-PCS | Performed by: INTERNAL MEDICINE

## 2023-03-23 PROCEDURE — 250N000009 HC RX 250: Performed by: THORACIC SURGERY (CARDIOTHORACIC VASCULAR SURGERY)

## 2023-03-23 PROCEDURE — 250N000011 HC RX IP 250 OP 636: Performed by: INTERNAL MEDICINE

## 2023-03-23 PROCEDURE — 200N000001 HC R&B ICU

## 2023-03-23 PROCEDURE — 250N000011 HC RX IP 250 OP 636: Performed by: ANESTHESIOLOGY

## 2023-03-23 PROCEDURE — 027035Z DILATION OF CORONARY ARTERY, ONE ARTERY WITH TWO DRUG-ELUTING INTRALUMINAL DEVICES, PERCUTANEOUS APPROACH: ICD-10-PCS | Performed by: INTERNAL MEDICINE

## 2023-03-23 PROCEDURE — 250N000025 HC SEVOFLURANE, PER MIN: Performed by: THORACIC SURGERY (CARDIOTHORACIC VASCULAR SURGERY)

## 2023-03-23 PROCEDURE — 250N000011 HC RX IP 250 OP 636: Performed by: THORACIC SURGERY (CARDIOTHORACIC VASCULAR SURGERY)

## 2023-03-23 PROCEDURE — 87641 MR-STAPH DNA AMP PROBE: CPT | Performed by: INTERNAL MEDICINE

## 2023-03-23 PROCEDURE — 99152 MOD SED SAME PHYS/QHP 5/>YRS: CPT | Performed by: INTERNAL MEDICINE

## 2023-03-23 PROCEDURE — P9045 ALBUMIN (HUMAN), 5%, 250 ML: HCPCS | Performed by: THORACIC SURGERY (CARDIOTHORACIC VASCULAR SURGERY)

## 2023-03-23 PROCEDURE — P9016 RBC LEUKOCYTES REDUCED: HCPCS | Performed by: ANESTHESIOLOGY

## 2023-03-23 PROCEDURE — C1760 CLOSURE DEV, VASC: HCPCS | Performed by: THORACIC SURGERY (CARDIOTHORACIC VASCULAR SURGERY)

## 2023-03-23 PROCEDURE — 99291 CRITICAL CARE FIRST HOUR: CPT | Mod: FT | Performed by: INTERNAL MEDICINE

## 2023-03-23 PROCEDURE — C1725 CATH, TRANSLUMIN NON-LASER: HCPCS | Performed by: INTERNAL MEDICINE

## 2023-03-23 PROCEDURE — 84132 ASSAY OF SERUM POTASSIUM: CPT

## 2023-03-23 PROCEDURE — 93005 ELECTROCARDIOGRAM TRACING: CPT | Performed by: INTERNAL MEDICINE

## 2023-03-23 PROCEDURE — 250N000011 HC RX IP 250 OP 636: Performed by: NURSE ANESTHETIST, CERTIFIED REGISTERED

## 2023-03-23 PROCEDURE — 250N000011 HC RX IP 250 OP 636: Performed by: PHYSICIAN ASSISTANT

## 2023-03-23 PROCEDURE — 85730 THROMBOPLASTIN TIME PARTIAL: CPT | Performed by: THORACIC SURGERY (CARDIOTHORACIC VASCULAR SURGERY)

## 2023-03-23 PROCEDURE — 85014 HEMATOCRIT: CPT | Performed by: PHYSICIAN ASSISTANT

## 2023-03-23 PROCEDURE — 0W9D3ZZ DRAINAGE OF PERICARDIAL CAVITY, PERCUTANEOUS APPROACH: ICD-10-PCS | Performed by: INTERNAL MEDICINE

## 2023-03-23 PROCEDURE — 33016 PERICARDIOCENTESIS W/IMAGING: CPT | Performed by: INTERNAL MEDICINE

## 2023-03-23 PROCEDURE — 250N000013 HC RX MED GY IP 250 OP 250 PS 637: Performed by: NURSE PRACTITIONER

## 2023-03-23 PROCEDURE — P9016 RBC LEUKOCYTES REDUCED: HCPCS | Performed by: THORACIC SURGERY (CARDIOTHORACIC VASCULAR SURGERY)

## 2023-03-23 PROCEDURE — 93458 L HRT ARTERY/VENTRICLE ANGIO: CPT | Mod: 26 | Performed by: INTERNAL MEDICINE

## 2023-03-23 PROCEDURE — 82803 BLOOD GASES ANY COMBINATION: CPT

## 2023-03-23 PROCEDURE — 370N000017 HC ANESTHESIA TECHNICAL FEE, PER MIN: Performed by: THORACIC SURGERY (CARDIOTHORACIC VASCULAR SURGERY)

## 2023-03-23 PROCEDURE — 93325 DOPPLER ECHO COLOR FLOW MAPG: CPT | Mod: 26 | Performed by: INTERNAL MEDICINE

## 2023-03-23 PROCEDURE — 0715T HC PRQ TRLUML CORONARY LITHOTRIPSY: CPT | Performed by: INTERNAL MEDICINE

## 2023-03-23 PROCEDURE — 85610 PROTHROMBIN TIME: CPT | Performed by: PHYSICIAN ASSISTANT

## 2023-03-23 PROCEDURE — C9600 PERC DRUG-EL COR STENT SING: HCPCS | Performed by: INTERNAL MEDICINE

## 2023-03-23 PROCEDURE — 93308 TTE F-UP OR LMTD: CPT

## 2023-03-23 PROCEDURE — 255N000002 HC RX 255 OP 636: Performed by: INTERNAL MEDICINE

## 2023-03-23 PROCEDURE — 272N000001 HC OR GENERAL SUPPLY STERILE: Performed by: INTERNAL MEDICINE

## 2023-03-23 PROCEDURE — 33967 INSERT I-AORT PERCUT DEVICE: CPT | Performed by: INTERNAL MEDICINE

## 2023-03-23 PROCEDURE — 36415 COLL VENOUS BLD VENIPUNCTURE: CPT | Performed by: NURSE ANESTHETIST, CERTIFIED REGISTERED

## 2023-03-23 PROCEDURE — 272N000001 HC OR GENERAL SUPPLY STERILE: Performed by: THORACIC SURGERY (CARDIOTHORACIC VASCULAR SURGERY)

## 2023-03-23 PROCEDURE — 258N000003 HC RX IP 258 OP 636: Performed by: NURSE ANESTHETIST, CERTIFIED REGISTERED

## 2023-03-23 PROCEDURE — 250N000013 HC RX MED GY IP 250 OP 250 PS 637: Performed by: THORACIC SURGERY (CARDIOTHORACIC VASCULAR SURGERY)

## 2023-03-23 PROCEDURE — 85730 THROMBOPLASTIN TIME PARTIAL: CPT | Performed by: PHYSICIAN ASSISTANT

## 2023-03-23 PROCEDURE — 93321 DOPPLER ECHO F-UP/LMTD STD: CPT | Mod: 26 | Performed by: INTERNAL MEDICINE

## 2023-03-23 PROCEDURE — 250N000012 HC RX MED GY IP 250 OP 636 PS 637: Performed by: THORACIC SURGERY (CARDIOTHORACIC VASCULAR SURGERY)

## 2023-03-23 PROCEDURE — 250N000009 HC RX 250: Performed by: PHYSICIAN ASSISTANT

## 2023-03-23 PROCEDURE — C1894 INTRO/SHEATH, NON-LASER: HCPCS | Performed by: INTERNAL MEDICINE

## 2023-03-23 PROCEDURE — 258N000003 HC RX IP 258 OP 636: Performed by: THORACIC SURGERY (CARDIOTHORACIC VASCULAR SURGERY)

## 2023-03-23 PROCEDURE — 5A1221Z PERFORMANCE OF CARDIAC OUTPUT, CONTINUOUS: ICD-10-PCS | Performed by: THORACIC SURGERY (CARDIOTHORACIC VASCULAR SURGERY)

## 2023-03-23 PROCEDURE — 410N000004: Performed by: THORACIC SURGERY (CARDIOTHORACIC VASCULAR SURGERY)

## 2023-03-23 PROCEDURE — 92928 PRQ TCAT PLMT NTRAC ST 1 LES: CPT | Mod: RC | Performed by: INTERNAL MEDICINE

## 2023-03-23 PROCEDURE — 0715T PR PERCUTANEOUS TRANSLUMINAL CORONARY LITHOTRIPSY: CPT | Performed by: INTERNAL MEDICINE

## 2023-03-23 PROCEDURE — 82805 BLOOD GASES W/O2 SATURATION: CPT

## 2023-03-23 PROCEDURE — 94002 VENT MGMT INPAT INIT DAY: CPT

## 2023-03-23 PROCEDURE — 85384 FIBRINOGEN ACTIVITY: CPT | Performed by: NURSE ANESTHETIST, CERTIFIED REGISTERED

## 2023-03-23 PROCEDURE — 82805 BLOOD GASES W/O2 SATURATION: CPT | Performed by: PHYSICIAN ASSISTANT

## 2023-03-23 PROCEDURE — 410N000003 HC PER-PERFUSION 1ST 30 MIN: Performed by: THORACIC SURGERY (CARDIOTHORACIC VASCULAR SURGERY)

## 2023-03-23 PROCEDURE — B211YZZ FLUOROSCOPY OF MULTIPLE CORONARY ARTERIES USING OTHER CONTRAST: ICD-10-PCS | Performed by: INTERNAL MEDICINE

## 2023-03-23 PROCEDURE — 93458 L HRT ARTERY/VENTRICLE ANGIO: CPT | Performed by: INTERNAL MEDICINE

## 2023-03-23 PROCEDURE — 84132 ASSAY OF SERUM POTASSIUM: CPT | Performed by: PHYSICIAN ASSISTANT

## 2023-03-23 PROCEDURE — 85027 COMPLETE CBC AUTOMATED: CPT | Performed by: NURSE ANESTHETIST, CERTIFIED REGISTERED

## 2023-03-23 PROCEDURE — 85347 COAGULATION TIME ACTIVATED: CPT

## 2023-03-23 PROCEDURE — 250N000012 HC RX MED GY IP 250 OP 636 PS 637

## 2023-03-23 PROCEDURE — 93325 DOPPLER ECHO COLOR FLOW MAPG: CPT

## 2023-03-23 PROCEDURE — 86923 COMPATIBILITY TEST ELECTRIC: CPT | Performed by: ANESTHESIOLOGY

## 2023-03-23 PROCEDURE — 83735 ASSAY OF MAGNESIUM: CPT | Performed by: PHYSICIAN ASSISTANT

## 2023-03-23 PROCEDURE — 02F03ZZ FRAGMENTATION IN CORONARY ARTERY, ONE ARTERY, PERCUTANEOUS APPROACH: ICD-10-PCS | Performed by: INTERNAL MEDICINE

## 2023-03-23 PROCEDURE — 82330 ASSAY OF CALCIUM: CPT

## 2023-03-23 PROCEDURE — C1874 STENT, COATED/COV W/DEL SYS: HCPCS | Performed by: INTERNAL MEDICINE

## 2023-03-23 PROCEDURE — 85610 PROTHROMBIN TIME: CPT | Performed by: THORACIC SURGERY (CARDIOTHORACIC VASCULAR SURGERY)

## 2023-03-23 PROCEDURE — 0W9D0ZZ DRAINAGE OF PERICARDIAL CAVITY, OPEN APPROACH: ICD-10-PCS | Performed by: THORACIC SURGERY (CARDIOTHORACIC VASCULAR SURGERY)

## 2023-03-23 PROCEDURE — 06BQ3ZZ EXCISION OF LEFT SAPHENOUS VEIN, PERCUTANEOUS APPROACH: ICD-10-PCS | Performed by: THORACIC SURGERY (CARDIOTHORACIC VASCULAR SURGERY)

## 2023-03-23 PROCEDURE — C1761 HC OR CATH, TRANS INTRA LITHO/CORONARY: HCPCS | Performed by: INTERNAL MEDICINE

## 2023-03-23 PROCEDURE — 93005 ELECTROCARDIOGRAM TRACING: CPT

## 2023-03-23 PROCEDURE — 250N000009 HC RX 250: Performed by: NURSE ANESTHETIST, CERTIFIED REGISTERED

## 2023-03-23 PROCEDURE — P9045 ALBUMIN (HUMAN), 5%, 250 ML: HCPCS | Performed by: INTERNAL MEDICINE

## 2023-03-23 PROCEDURE — 85730 THROMBOPLASTIN TIME PARTIAL: CPT | Performed by: NURSE ANESTHETIST, CERTIFIED REGISTERED

## 2023-03-23 PROCEDURE — 250N000009 HC RX 250: Performed by: INTERNAL MEDICINE

## 2023-03-23 PROCEDURE — 84100 ASSAY OF PHOSPHORUS: CPT | Performed by: PHYSICIAN ASSISTANT

## 2023-03-23 PROCEDURE — 258N000003 HC RX IP 258 OP 636

## 2023-03-23 PROCEDURE — 85610 PROTHROMBIN TIME: CPT | Performed by: NURSE ANESTHETIST, CERTIFIED REGISTERED

## 2023-03-23 PROCEDURE — 02703ZZ DILATION OF CORONARY ARTERY, ONE ARTERY, PERCUTANEOUS APPROACH: ICD-10-PCS | Performed by: INTERNAL MEDICINE

## 2023-03-23 PROCEDURE — 999N000026 HC STATISTIC CARDIOPULM RESUSCITATION

## 2023-03-23 PROCEDURE — 93308 TTE F-UP OR LMTD: CPT | Mod: 26 | Performed by: INTERNAL MEDICINE

## 2023-03-23 PROCEDURE — 82330 ASSAY OF CALCIUM: CPT | Performed by: PHYSICIAN ASSISTANT

## 2023-03-23 PROCEDURE — 99153 MOD SED SAME PHYS/QHP EA: CPT | Performed by: INTERNAL MEDICINE

## 2023-03-23 PROCEDURE — 021009W BYPASS CORONARY ARTERY, ONE ARTERY FROM AORTA WITH AUTOLOGOUS VENOUS TISSUE, OPEN APPROACH: ICD-10-PCS | Performed by: THORACIC SURGERY (CARDIOTHORACIC VASCULAR SURGERY)

## 2023-03-23 PROCEDURE — 250N000015 HC RX FACTOR IP MED 636 OP 636: Performed by: NURSE ANESTHETIST, CERTIFIED REGISTERED

## 2023-03-23 DEVICE — SS SUTURE, 4 PER SLEEVE
Type: IMPLANTABLE DEVICE | Site: CHEST | Status: FUNCTIONAL
Brand: MYO/WIRE II

## 2023-03-23 DEVICE — KIT PERICARDIOCENTESIS 8FR PC801: Type: IMPLANTABLE DEVICE | Status: FUNCTIONAL

## 2023-03-23 DEVICE — STENT COR ONYX FRONTIER 18X3.50MM ONYXNG35018UX: Type: IMPLANTABLE DEVICE | Site: CORONARY | Status: FUNCTIONAL

## 2023-03-23 DEVICE — SS SUTURE, 3 PER SLEEVE
Type: IMPLANTABLE DEVICE | Site: CHEST | Status: FUNCTIONAL
Brand: MYO/WIRE II

## 2023-03-23 RX ORDER — FENTANYL CITRATE 50 UG/ML
50 INJECTION, SOLUTION INTRAMUSCULAR; INTRAVENOUS EVERY 5 MIN PRN
Status: DISCONTINUED | OUTPATIENT
Start: 2023-03-23 | End: 2023-03-23

## 2023-03-23 RX ORDER — SODIUM CHLORIDE 9 MG/ML
INJECTION, SOLUTION INTRAVENOUS CONTINUOUS PRN
Status: DISCONTINUED | OUTPATIENT
Start: 2023-03-23 | End: 2023-03-23

## 2023-03-23 RX ORDER — CALCIUM GLUCONATE 20 MG/ML
2 INJECTION, SOLUTION INTRAVENOUS
Status: DISCONTINUED | OUTPATIENT
Start: 2023-03-23 | End: 2023-03-27 | Stop reason: HOSPADM

## 2023-03-23 RX ORDER — IODIXANOL 320 MG/ML
INJECTION, SOLUTION INTRAVASCULAR
Status: DISCONTINUED | OUTPATIENT
Start: 2023-03-23 | End: 2023-03-23 | Stop reason: HOSPADM

## 2023-03-23 RX ORDER — HYDROMORPHONE HCL IN WATER/PF 6 MG/30 ML
0.4 PATIENT CONTROLLED ANALGESIA SYRINGE INTRAVENOUS EVERY 5 MIN PRN
Status: DISCONTINUED | OUTPATIENT
Start: 2023-03-23 | End: 2023-03-23

## 2023-03-23 RX ORDER — HYDROMORPHONE HCL IN WATER/PF 6 MG/30 ML
0.2 PATIENT CONTROLLED ANALGESIA SYRINGE INTRAVENOUS
Status: DISCONTINUED | OUTPATIENT
Start: 2023-03-23 | End: 2023-03-26

## 2023-03-23 RX ORDER — OXYCODONE HYDROCHLORIDE 5 MG/1
5 TABLET ORAL EVERY 4 HOURS PRN
Status: DISCONTINUED | OUTPATIENT
Start: 2023-03-23 | End: 2023-03-23

## 2023-03-23 RX ORDER — ASPIRIN 81 MG/1
81 TABLET, CHEWABLE ORAL DAILY
Qty: 30 TABLET | Refills: 3 | Status: SHIPPED | OUTPATIENT
Start: 2023-03-23 | End: 2023-04-13

## 2023-03-23 RX ORDER — METOPROLOL TARTRATE 1 MG/ML
5 INJECTION, SOLUTION INTRAVENOUS
Status: DISCONTINUED | OUTPATIENT
Start: 2023-03-23 | End: 2023-03-27 | Stop reason: HOSPADM

## 2023-03-23 RX ORDER — DEXMEDETOMIDINE HYDROCHLORIDE 4 UG/ML
.2-.7 INJECTION, SOLUTION INTRAVENOUS CONTINUOUS
Status: DISCONTINUED | OUTPATIENT
Start: 2023-03-23 | End: 2023-03-25

## 2023-03-23 RX ORDER — ASPIRIN 81 MG/1
243 TABLET, CHEWABLE ORAL ONCE
Status: COMPLETED | OUTPATIENT
Start: 2023-03-23 | End: 2023-03-23

## 2023-03-23 RX ORDER — FENTANYL CITRATE 50 UG/ML
25 INJECTION, SOLUTION INTRAMUSCULAR; INTRAVENOUS
Status: DISCONTINUED | OUTPATIENT
Start: 2023-03-23 | End: 2023-03-25

## 2023-03-23 RX ORDER — SODIUM CHLORIDE, SODIUM GLUCONATE, SODIUM ACETATE, POTASSIUM CHLORIDE AND MAGNESIUM CHLORIDE 526; 502; 368; 37; 30 MG/100ML; MG/100ML; MG/100ML; MG/100ML; MG/100ML
1000 INJECTION, SOLUTION INTRAVENOUS
Status: DISCONTINUED | OUTPATIENT
Start: 2023-03-23 | End: 2023-03-23

## 2023-03-23 RX ORDER — ASPIRIN 325 MG
325 TABLET ORAL ONCE
Status: COMPLETED | OUTPATIENT
Start: 2023-03-23 | End: 2023-03-23

## 2023-03-23 RX ORDER — NALOXONE HYDROCHLORIDE 0.4 MG/ML
0.4 INJECTION, SOLUTION INTRAMUSCULAR; INTRAVENOUS; SUBCUTANEOUS
Status: ACTIVE | OUTPATIENT
Start: 2023-03-23 | End: 2023-03-24

## 2023-03-23 RX ORDER — DEXMEDETOMIDINE HYDROCHLORIDE 4 UG/ML
0.2-1.2 INJECTION, SOLUTION INTRAVENOUS CONTINUOUS
Status: DISCONTINUED | OUTPATIENT
Start: 2023-03-23 | End: 2023-03-23

## 2023-03-23 RX ORDER — LIDOCAINE 4 G/G
2-4 PATCH TOPICAL
Status: DISCONTINUED | OUTPATIENT
Start: 2023-03-23 | End: 2023-03-27 | Stop reason: HOSPADM

## 2023-03-23 RX ORDER — HYDROMORPHONE HCL IN WATER/PF 6 MG/30 ML
0.4 PATIENT CONTROLLED ANALGESIA SYRINGE INTRAVENOUS
Status: DISCONTINUED | OUTPATIENT
Start: 2023-03-23 | End: 2023-03-26

## 2023-03-23 RX ORDER — NALOXONE HYDROCHLORIDE 0.4 MG/ML
0.2 INJECTION, SOLUTION INTRAMUSCULAR; INTRAVENOUS; SUBCUTANEOUS
Status: ACTIVE | OUTPATIENT
Start: 2023-03-23 | End: 2023-03-24

## 2023-03-23 RX ORDER — SODIUM CHLORIDE, SODIUM LACTATE, POTASSIUM CHLORIDE, CALCIUM CHLORIDE 600; 310; 30; 20 MG/100ML; MG/100ML; MG/100ML; MG/100ML
INJECTION, SOLUTION INTRAVENOUS CONTINUOUS
Status: DISCONTINUED | OUTPATIENT
Start: 2023-03-23 | End: 2023-03-24

## 2023-03-23 RX ORDER — POLYETHYLENE GLYCOL 3350 17 G/17G
17 POWDER, FOR SOLUTION ORAL DAILY
Status: DISCONTINUED | OUTPATIENT
Start: 2023-03-24 | End: 2023-03-27 | Stop reason: HOSPADM

## 2023-03-23 RX ORDER — NICOTINE POLACRILEX 4 MG
15-30 LOZENGE BUCCAL
Status: DISCONTINUED | OUTPATIENT
Start: 2023-03-23 | End: 2023-03-23

## 2023-03-23 RX ORDER — NICOTINE POLACRILEX 4 MG
15-30 LOZENGE BUCCAL
Status: DISCONTINUED | OUTPATIENT
Start: 2023-03-23 | End: 2023-03-27 | Stop reason: HOSPADM

## 2023-03-23 RX ORDER — METHADONE HYDROCHLORIDE 10 MG/ML
0.3 INJECTION, SOLUTION INTRAMUSCULAR; INTRAVENOUS; SUBCUTANEOUS ONCE
Status: DISCONTINUED | OUTPATIENT
Start: 2023-03-23 | End: 2023-03-23

## 2023-03-23 RX ORDER — OXYCODONE HYDROCHLORIDE 5 MG/1
10 TABLET ORAL
Status: DISCONTINUED | OUTPATIENT
Start: 2023-03-23 | End: 2023-03-23

## 2023-03-23 RX ORDER — AMOXICILLIN 250 MG
1 CAPSULE ORAL 2 TIMES DAILY
Status: DISCONTINUED | OUTPATIENT
Start: 2023-03-23 | End: 2023-03-27 | Stop reason: HOSPADM

## 2023-03-23 RX ORDER — HYDRALAZINE HYDROCHLORIDE 20 MG/ML
10 INJECTION INTRAMUSCULAR; INTRAVENOUS EVERY 4 HOURS PRN
Status: DISCONTINUED | OUTPATIENT
Start: 2023-03-23 | End: 2023-03-27 | Stop reason: HOSPADM

## 2023-03-23 RX ORDER — ACETAMINOPHEN 325 MG/1
650 TABLET ORAL EVERY 4 HOURS PRN
Status: DISCONTINUED | OUTPATIENT
Start: 2023-03-23 | End: 2023-03-26

## 2023-03-23 RX ORDER — MAGNESIUM SULFATE 4 G/50ML
4 INJECTION INTRAVENOUS ONCE
Status: COMPLETED | OUTPATIENT
Start: 2023-03-23 | End: 2023-03-23

## 2023-03-23 RX ORDER — OXYCODONE HYDROCHLORIDE 5 MG/1
10 TABLET ORAL EVERY 4 HOURS PRN
Status: DISCONTINUED | OUTPATIENT
Start: 2023-03-23 | End: 2023-03-23

## 2023-03-23 RX ORDER — ASPIRIN 81 MG/1
81 TABLET ORAL DAILY
Status: DISCONTINUED | OUTPATIENT
Start: 2023-03-23 | End: 2023-03-23

## 2023-03-23 RX ORDER — ATORVASTATIN CALCIUM 40 MG/1
80 TABLET, FILM COATED ORAL DAILY
Status: DISCONTINUED | OUTPATIENT
Start: 2023-03-23 | End: 2023-03-27 | Stop reason: HOSPADM

## 2023-03-23 RX ORDER — PHENYLEPHRINE HCL IN 0.9% NACL 50MG/250ML
.1-4 PLASTIC BAG, INJECTION (ML) INTRAVENOUS CONTINUOUS PRN
Status: DISCONTINUED | OUTPATIENT
Start: 2023-03-23 | End: 2023-03-25

## 2023-03-23 RX ORDER — POTASSIUM CHLORIDE 29.8 MG/ML
20 INJECTION INTRAVENOUS ONCE
Status: COMPLETED | OUTPATIENT
Start: 2023-03-23 | End: 2023-03-23

## 2023-03-23 RX ORDER — VANCOMYCIN HYDROCHLORIDE 1 G/20ML
INJECTION, POWDER, LYOPHILIZED, FOR SOLUTION INTRAVENOUS PRN
Status: DISCONTINUED | OUTPATIENT
Start: 2023-03-23 | End: 2023-03-23

## 2023-03-23 RX ORDER — PROTAMINE SULFATE 10 MG/ML
INJECTION, SOLUTION INTRAVENOUS
Status: DISCONTINUED | OUTPATIENT
Start: 2023-03-23 | End: 2023-03-23 | Stop reason: HOSPADM

## 2023-03-23 RX ORDER — PANTOPRAZOLE SODIUM 40 MG/1
40 TABLET, DELAYED RELEASE ORAL DAILY
Status: DISCONTINUED | OUTPATIENT
Start: 2023-03-23 | End: 2023-03-27 | Stop reason: HOSPADM

## 2023-03-23 RX ORDER — HEPARIN SODIUM 1000 [USP'U]/ML
INJECTION, SOLUTION INTRAVENOUS; SUBCUTANEOUS
Status: DISCONTINUED | OUTPATIENT
Start: 2023-03-23 | End: 2023-03-23 | Stop reason: HOSPADM

## 2023-03-23 RX ORDER — CEFAZOLIN SODIUM 2 G/100ML
2 INJECTION, SOLUTION INTRAVENOUS
Status: DISCONTINUED | OUTPATIENT
Start: 2023-03-23 | End: 2023-03-23

## 2023-03-23 RX ORDER — NITROGLYCERIN 0.4 MG/1
TABLET SUBLINGUAL
Status: DISCONTINUED | OUTPATIENT
Start: 2023-03-23 | End: 2023-03-23 | Stop reason: HOSPADM

## 2023-03-23 RX ORDER — LIDOCAINE 40 MG/G
CREAM TOPICAL
Status: DISCONTINUED | OUTPATIENT
Start: 2023-03-23 | End: 2023-03-23 | Stop reason: HOSPADM

## 2023-03-23 RX ORDER — CEFAZOLIN SODIUM/WATER 2 G/20 ML
2 SYRINGE (ML) INTRAVENOUS SEE ADMIN INSTRUCTIONS
Status: DISCONTINUED | OUTPATIENT
Start: 2023-03-23 | End: 2023-03-23 | Stop reason: HOSPADM

## 2023-03-23 RX ORDER — SODIUM CHLORIDE, SODIUM LACTATE, POTASSIUM CHLORIDE, CALCIUM CHLORIDE 600; 310; 30; 20 MG/100ML; MG/100ML; MG/100ML; MG/100ML
INJECTION, SOLUTION INTRAVENOUS CONTINUOUS
Status: DISCONTINUED | OUTPATIENT
Start: 2023-03-23 | End: 2023-03-25

## 2023-03-23 RX ORDER — BISACODYL 10 MG
10 SUPPOSITORY, RECTAL RECTAL DAILY PRN
Status: DISCONTINUED | OUTPATIENT
Start: 2023-03-23 | End: 2023-03-27 | Stop reason: HOSPADM

## 2023-03-23 RX ORDER — CEFAZOLIN SODIUM/WATER 2 G/20 ML
2 SYRINGE (ML) INTRAVENOUS
Status: DISCONTINUED | OUTPATIENT
Start: 2023-03-23 | End: 2023-03-23 | Stop reason: HOSPADM

## 2023-03-23 RX ORDER — METHADONE HYDROCHLORIDE 10 MG/ML
INJECTION, SOLUTION INTRAMUSCULAR; INTRAVENOUS; SUBCUTANEOUS
Status: DISCONTINUED
Start: 2023-03-23 | End: 2023-03-23 | Stop reason: HOSPADM

## 2023-03-23 RX ORDER — EPINEPHRINE IN SOD CHLOR,ISO 1 MG/10 ML
SYRINGE (ML) INTRAVENOUS
Status: DISCONTINUED | OUTPATIENT
Start: 2023-03-23 | End: 2023-03-23 | Stop reason: HOSPADM

## 2023-03-23 RX ORDER — FENTANYL CITRATE 50 UG/ML
25 INJECTION, SOLUTION INTRAMUSCULAR; INTRAVENOUS
Status: DISCONTINUED | OUTPATIENT
Start: 2023-03-23 | End: 2023-03-23 | Stop reason: HOSPADM

## 2023-03-23 RX ORDER — LIDOCAINE 40 MG/G
CREAM TOPICAL
Status: DISCONTINUED | OUTPATIENT
Start: 2023-03-23 | End: 2023-03-27 | Stop reason: HOSPADM

## 2023-03-23 RX ORDER — HYDROXYCHLOROQUINE SULFATE 200 MG/1
400 TABLET, FILM COATED ORAL DAILY
Status: DISCONTINUED | OUTPATIENT
Start: 2023-03-23 | End: 2023-03-27 | Stop reason: HOSPADM

## 2023-03-23 RX ORDER — ASPIRIN 81 MG/1
162 TABLET, CHEWABLE ORAL DAILY
Status: DISCONTINUED | OUTPATIENT
Start: 2023-03-23 | End: 2023-03-24

## 2023-03-23 RX ORDER — SULFASALAZINE 500 MG/1
1500 TABLET, DELAYED RELEASE ORAL 2 TIMES DAILY
Status: DISCONTINUED | OUTPATIENT
Start: 2023-03-23 | End: 2023-03-24

## 2023-03-23 RX ORDER — ACETAMINOPHEN 325 MG/1
975 TABLET ORAL EVERY 8 HOURS
Status: DISCONTINUED | OUTPATIENT
Start: 2023-03-23 | End: 2023-03-27 | Stop reason: HOSPADM

## 2023-03-23 RX ORDER — SODIUM CHLORIDE 9 MG/ML
INJECTION, SOLUTION INTRAVENOUS CONTINUOUS
Status: DISCONTINUED | OUTPATIENT
Start: 2023-03-23 | End: 2023-03-23 | Stop reason: HOSPADM

## 2023-03-23 RX ORDER — HEPARIN SODIUM 5000 [USP'U]/.5ML
5000 INJECTION, SOLUTION INTRAVENOUS; SUBCUTANEOUS EVERY 8 HOURS
Status: DISCONTINUED | OUTPATIENT
Start: 2023-03-24 | End: 2023-03-27 | Stop reason: HOSPADM

## 2023-03-23 RX ORDER — MUPIROCIN 20 MG/G
0.5 OINTMENT TOPICAL 2 TIMES DAILY
Status: DISCONTINUED | OUTPATIENT
Start: 2023-03-23 | End: 2023-03-24

## 2023-03-23 RX ORDER — NITROGLYCERIN 10 MG/100ML
INJECTION INTRAVENOUS PRN
Status: DISCONTINUED | OUTPATIENT
Start: 2023-03-23 | End: 2023-03-23

## 2023-03-23 RX ORDER — ONDANSETRON 4 MG/1
4 TABLET, ORALLY DISINTEGRATING ORAL EVERY 30 MIN PRN
Status: DISCONTINUED | OUTPATIENT
Start: 2023-03-23 | End: 2023-03-23

## 2023-03-23 RX ORDER — NOREPINEPHRINE BITARTRATE 0.02 MG/ML
.01-.1 INJECTION, SOLUTION INTRAVENOUS CONTINUOUS
Status: DISCONTINUED | OUTPATIENT
Start: 2023-03-23 | End: 2023-03-25

## 2023-03-23 RX ORDER — CEFAZOLIN SODIUM 2 G/100ML
2 INJECTION, SOLUTION INTRAVENOUS SEE ADMIN INSTRUCTIONS
Status: DISCONTINUED | OUTPATIENT
Start: 2023-03-23 | End: 2023-03-23

## 2023-03-23 RX ORDER — CEFAZOLIN SODIUM 2 G/100ML
2 INJECTION, SOLUTION INTRAVENOUS EVERY 8 HOURS
Status: COMPLETED | OUTPATIENT
Start: 2023-03-23 | End: 2023-03-24

## 2023-03-23 RX ORDER — ONDANSETRON 4 MG/1
4 TABLET, ORALLY DISINTEGRATING ORAL EVERY 6 HOURS PRN
Status: DISCONTINUED | OUTPATIENT
Start: 2023-03-23 | End: 2023-03-27 | Stop reason: HOSPADM

## 2023-03-23 RX ORDER — SODIUM CHLORIDE 9 MG/ML
INJECTION, SOLUTION INTRAVENOUS CONTINUOUS
Status: ACTIVE | OUTPATIENT
Start: 2023-03-23 | End: 2023-03-23

## 2023-03-23 RX ORDER — DIAZEPAM 5 MG
5 TABLET ORAL ONCE
Status: COMPLETED | OUTPATIENT
Start: 2023-03-23 | End: 2023-03-23

## 2023-03-23 RX ORDER — ATROPINE SULFATE 0.1 MG/ML
0.5 INJECTION INTRAVENOUS
Status: ACTIVE | OUTPATIENT
Start: 2023-03-23 | End: 2023-03-24

## 2023-03-23 RX ORDER — OXYCODONE HYDROCHLORIDE 5 MG/1
10 TABLET ORAL EVERY 4 HOURS PRN
Status: DISCONTINUED | OUTPATIENT
Start: 2023-03-23 | End: 2023-03-27 | Stop reason: HOSPADM

## 2023-03-23 RX ORDER — FENTANYL CITRATE 50 UG/ML
25 INJECTION, SOLUTION INTRAMUSCULAR; INTRAVENOUS EVERY 5 MIN PRN
Status: DISCONTINUED | OUTPATIENT
Start: 2023-03-23 | End: 2023-03-23

## 2023-03-23 RX ORDER — ASPIRIN 81 MG/1
81 TABLET ORAL DAILY
Status: DISCONTINUED | OUTPATIENT
Start: 2023-03-24 | End: 2023-03-27 | Stop reason: HOSPADM

## 2023-03-23 RX ORDER — DEXTROSE MONOHYDRATE 25 G/50ML
25-50 INJECTION, SOLUTION INTRAVENOUS
Status: DISCONTINUED | OUTPATIENT
Start: 2023-03-23 | End: 2023-03-23

## 2023-03-23 RX ORDER — NITROGLYCERIN 0.4 MG/1
TABLET SUBLINGUAL
Qty: 25 TABLET | Refills: 3 | Status: SHIPPED | OUTPATIENT
Start: 2023-03-23 | End: 2023-03-27

## 2023-03-23 RX ORDER — VANCOMYCIN HYDROCHLORIDE 1 G/20ML
INJECTION, POWDER, LYOPHILIZED, FOR SOLUTION INTRAVENOUS PRN
Status: DISCONTINUED | OUTPATIENT
Start: 2023-03-23 | End: 2023-03-23 | Stop reason: HOSPADM

## 2023-03-23 RX ORDER — FENTANYL CITRATE 50 UG/ML
INJECTION, SOLUTION INTRAMUSCULAR; INTRAVENOUS
Status: DISCONTINUED | OUTPATIENT
Start: 2023-03-23 | End: 2023-03-23 | Stop reason: HOSPADM

## 2023-03-23 RX ORDER — KETAMINE HYDROCHLORIDE 10 MG/ML
INJECTION INTRAMUSCULAR; INTRAVENOUS PRN
Status: DISCONTINUED | OUTPATIENT
Start: 2023-03-23 | End: 2023-03-23

## 2023-03-23 RX ORDER — HYDRALAZINE HYDROCHLORIDE 20 MG/ML
10 INJECTION INTRAMUSCULAR; INTRAVENOUS EVERY 30 MIN PRN
Status: DISCONTINUED | OUTPATIENT
Start: 2023-03-23 | End: 2023-03-27 | Stop reason: HOSPADM

## 2023-03-23 RX ORDER — ASPIRIN 81 MG/1
162 TABLET, CHEWABLE ORAL
Status: DISCONTINUED | OUTPATIENT
Start: 2023-03-23 | End: 2023-03-23

## 2023-03-23 RX ORDER — CHLORHEXIDINE GLUCONATE ORAL RINSE 1.2 MG/ML
10 SOLUTION DENTAL ONCE
Status: COMPLETED | OUTPATIENT
Start: 2023-03-23 | End: 2023-03-23

## 2023-03-23 RX ORDER — ASPIRIN 81 MG/1
81 TABLET, CHEWABLE ORAL
Status: DISCONTINUED | OUTPATIENT
Start: 2023-03-23 | End: 2023-03-23

## 2023-03-23 RX ORDER — NITROGLYCERIN 0.4 MG/1
0.4 TABLET SUBLINGUAL EVERY 5 MIN PRN
Status: DISCONTINUED | OUTPATIENT
Start: 2023-03-23 | End: 2023-03-24

## 2023-03-23 RX ORDER — HYDROMORPHONE HCL IN WATER/PF 6 MG/30 ML
0.2 PATIENT CONTROLLED ANALGESIA SYRINGE INTRAVENOUS EVERY 5 MIN PRN
Status: DISCONTINUED | OUTPATIENT
Start: 2023-03-23 | End: 2023-03-23

## 2023-03-23 RX ORDER — ACETAMINOPHEN 325 MG/1
650 TABLET ORAL EVERY 4 HOURS PRN
Status: DISCONTINUED | OUTPATIENT
Start: 2023-04-02 | End: 2023-03-23

## 2023-03-23 RX ORDER — ONDANSETRON 4 MG/1
4 TABLET, ORALLY DISINTEGRATING ORAL EVERY 6 HOURS PRN
Status: DISCONTINUED | OUTPATIENT
Start: 2023-03-23 | End: 2023-03-23

## 2023-03-23 RX ORDER — VANCOMYCIN HYDROCHLORIDE 1 G/200ML
1000 INJECTION, SOLUTION INTRAVENOUS ONCE
Status: DISCONTINUED | OUTPATIENT
Start: 2023-03-23 | End: 2023-03-23 | Stop reason: HOSPADM

## 2023-03-23 RX ORDER — ATORVASTATIN CALCIUM 80 MG/1
80 TABLET, FILM COATED ORAL DAILY
Qty: 90 TABLET | Refills: 3 | Status: SHIPPED | OUTPATIENT
Start: 2023-03-23 | End: 2023-03-27

## 2023-03-23 RX ORDER — LIDOCAINE 40 MG/G
CREAM TOPICAL
Status: DISCONTINUED | OUTPATIENT
Start: 2023-03-23 | End: 2023-03-23

## 2023-03-23 RX ORDER — ONDANSETRON 2 MG/ML
4 INJECTION INTRAMUSCULAR; INTRAVENOUS EVERY 6 HOURS PRN
Status: DISCONTINUED | OUTPATIENT
Start: 2023-03-23 | End: 2023-03-23

## 2023-03-23 RX ORDER — ONDANSETRON 2 MG/ML
4 INJECTION INTRAMUSCULAR; INTRAVENOUS EVERY 6 HOURS PRN
Status: DISCONTINUED | OUTPATIENT
Start: 2023-03-23 | End: 2023-03-27 | Stop reason: HOSPADM

## 2023-03-23 RX ORDER — DEXMEDETOMIDINE HYDROCHLORIDE 4 UG/ML
.1-1.2 INJECTION, SOLUTION INTRAVENOUS CONTINUOUS
Status: DISCONTINUED | OUTPATIENT
Start: 2023-03-23 | End: 2023-03-23 | Stop reason: HOSPADM

## 2023-03-23 RX ORDER — PHENYLEPHRINE HCL IN 0.9% NACL 50MG/250ML
.1-6 PLASTIC BAG, INJECTION (ML) INTRAVENOUS CONTINUOUS
Status: DISCONTINUED | OUTPATIENT
Start: 2023-03-23 | End: 2023-03-23

## 2023-03-23 RX ORDER — CALCIUM CHLORIDE 100 MG/ML
INJECTION INTRAVENOUS; INTRAVENTRICULAR PRN
Status: DISCONTINUED | OUTPATIENT
Start: 2023-03-23 | End: 2023-03-23

## 2023-03-23 RX ORDER — ASPIRIN 81 MG/1
81 TABLET, CHEWABLE ORAL ONCE
Status: DISCONTINUED | OUTPATIENT
Start: 2023-03-23 | End: 2023-03-23

## 2023-03-23 RX ORDER — PHENYLEPHRINE HCL IN 0.9% NACL 50MG/250ML
.1-6 PLASTIC BAG, INJECTION (ML) INTRAVENOUS CONTINUOUS
Status: DISCONTINUED | OUTPATIENT
Start: 2023-03-23 | End: 2023-03-23 | Stop reason: HOSPADM

## 2023-03-23 RX ORDER — PROCHLORPERAZINE MALEATE 5 MG
5 TABLET ORAL EVERY 6 HOURS PRN
Status: DISCONTINUED | OUTPATIENT
Start: 2023-03-23 | End: 2023-03-27 | Stop reason: HOSPADM

## 2023-03-23 RX ORDER — CALCIUM GLUCONATE 20 MG/ML
1 INJECTION, SOLUTION INTRAVENOUS
Status: DISCONTINUED | OUTPATIENT
Start: 2023-03-23 | End: 2023-03-27 | Stop reason: HOSPADM

## 2023-03-23 RX ORDER — NITROGLYCERIN 5 MG/ML
VIAL (ML) INTRAVENOUS
Status: DISCONTINUED | OUTPATIENT
Start: 2023-03-23 | End: 2023-03-23 | Stop reason: HOSPADM

## 2023-03-23 RX ORDER — FENTANYL CITRATE 50 UG/ML
INJECTION, SOLUTION INTRAMUSCULAR; INTRAVENOUS PRN
Status: DISCONTINUED | OUTPATIENT
Start: 2023-03-23 | End: 2023-03-23

## 2023-03-23 RX ORDER — FLUMAZENIL 0.1 MG/ML
0.2 INJECTION, SOLUTION INTRAVENOUS
Status: ACTIVE | OUTPATIENT
Start: 2023-03-23 | End: 2023-03-24

## 2023-03-23 RX ORDER — ONDANSETRON 2 MG/ML
4 INJECTION INTRAMUSCULAR; INTRAVENOUS EVERY 30 MIN PRN
Status: DISCONTINUED | OUTPATIENT
Start: 2023-03-23 | End: 2023-03-23

## 2023-03-23 RX ORDER — DEXTROSE MONOHYDRATE 25 G/50ML
25-50 INJECTION, SOLUTION INTRAVENOUS
Status: DISCONTINUED | OUTPATIENT
Start: 2023-03-23 | End: 2023-03-27 | Stop reason: HOSPADM

## 2023-03-23 RX ORDER — OXYCODONE HYDROCHLORIDE 5 MG/1
5 TABLET ORAL EVERY 4 HOURS PRN
Status: DISCONTINUED | OUTPATIENT
Start: 2023-03-23 | End: 2023-03-27 | Stop reason: HOSPADM

## 2023-03-23 RX ORDER — OXYCODONE HYDROCHLORIDE 5 MG/1
5 TABLET ORAL
Status: DISCONTINUED | OUTPATIENT
Start: 2023-03-23 | End: 2023-03-23

## 2023-03-23 RX ORDER — METHADONE HYDROCHLORIDE 10 MG/ML
INJECTION, SOLUTION INTRAMUSCULAR; INTRAVENOUS; SUBCUTANEOUS PRN
Status: DISCONTINUED | OUTPATIENT
Start: 2023-03-23 | End: 2023-03-23

## 2023-03-23 RX ADMIN — ALBUMIN HUMAN 12.5 G: 0.05 INJECTION, SOLUTION INTRAVENOUS at 18:56

## 2023-03-23 RX ADMIN — FENTANYL CITRATE 100 MCG: 50 INJECTION, SOLUTION INTRAMUSCULAR; INTRAVENOUS at 13:49

## 2023-03-23 RX ADMIN — Medication 0.5 MCG/KG/MIN: at 12:57

## 2023-03-23 RX ADMIN — CHLORHEXIDINE GLUCONATE 10 ML: 1.2 SOLUTION ORAL at 18:55

## 2023-03-23 RX ADMIN — NITROGLYCERIN 40 MCG: 10 INJECTION INTRAVENOUS at 13:22

## 2023-03-23 RX ADMIN — Medication 2 G: at 13:03

## 2023-03-23 RX ADMIN — KETAMINE HYDROCHLORIDE 20 MG: 10 INJECTION, SOLUTION INTRAMUSCULAR; INTRAVENOUS at 14:03

## 2023-03-23 RX ADMIN — PHENYLEPHRINE HYDROCHLORIDE 100 MCG: 10 INJECTION INTRAVENOUS at 12:57

## 2023-03-23 RX ADMIN — ALBUMIN HUMAN: 0.05 INJECTION, SOLUTION INTRAVENOUS at 15:47

## 2023-03-23 RX ADMIN — PHENYLEPHRINE HYDROCHLORIDE 100 MCG: 10 INJECTION INTRAVENOUS at 13:12

## 2023-03-23 RX ADMIN — SODIUM CHLORIDE: 9 INJECTION, SOLUTION INTRAVENOUS at 14:13

## 2023-03-23 RX ADMIN — ALBUMIN HUMAN 12.5 G: 0.05 INJECTION, SOLUTION INTRAVENOUS at 18:00

## 2023-03-23 RX ADMIN — NITROGLYCERIN 40 MCG: 10 INJECTION INTRAVENOUS at 13:12

## 2023-03-23 RX ADMIN — POTASSIUM CHLORIDE 20 MEQ: 29.8 INJECTION, SOLUTION INTRAVENOUS at 19:46

## 2023-03-23 RX ADMIN — SODIUM CHLORIDE: 9 INJECTION, SOLUTION INTRAVENOUS at 13:31

## 2023-03-23 RX ADMIN — DEXMEDETOMIDINE HYDROCHLORIDE 0.5 MCG/KG/HR: 400 INJECTION INTRAVENOUS at 12:35

## 2023-03-23 RX ADMIN — AMINOCAPROIC ACID 1 G/HR: 250 INJECTION, SOLUTION INTRAVENOUS at 14:43

## 2023-03-23 RX ADMIN — SODIUM BICARBONATE 50 MEQ: 84 INJECTION, SOLUTION INTRAVENOUS at 13:18

## 2023-03-23 RX ADMIN — MAGNESIUM SULFATE HEPTAHYDRATE 4 G: 80 INJECTION, SOLUTION INTRAVENOUS at 18:47

## 2023-03-23 RX ADMIN — Medication 50 MEQ: at 17:48

## 2023-03-23 RX ADMIN — ROCURONIUM BROMIDE 100 MG: 50 INJECTION, SOLUTION INTRAVENOUS at 12:45

## 2023-03-23 RX ADMIN — NITROGLYCERIN 40 MCG: 10 INJECTION INTRAVENOUS at 13:21

## 2023-03-23 RX ADMIN — AMIODARONE HYDROCHLORIDE 150 MG: 1.5 INJECTION, SOLUTION INTRAVENOUS at 15:09

## 2023-03-23 RX ADMIN — EPINEPHRINE 0.02 MCG/KG/MIN: 1 INJECTION INTRAMUSCULAR; INTRAVENOUS; SUBCUTANEOUS at 15:06

## 2023-03-23 RX ADMIN — DIAZEPAM 5 MG: 5 TABLET ORAL at 08:23

## 2023-03-23 RX ADMIN — HEPARIN SODIUM 0.4 UNITS: 1000 INJECTION INTRAVENOUS; SUBCUTANEOUS at 15:31

## 2023-03-23 RX ADMIN — ALBUMIN HUMAN: 0.05 INJECTION, SOLUTION INTRAVENOUS at 15:25

## 2023-03-23 RX ADMIN — HEPARIN SODIUM 33000 UNITS: 1000 INJECTION INTRAVENOUS; SUBCUTANEOUS at 14:12

## 2023-03-23 RX ADMIN — SODIUM BICARBONATE 50 MEQ: 84 INJECTION, SOLUTION INTRAVENOUS at 17:48

## 2023-03-23 RX ADMIN — SODIUM CHLORIDE: 9 INJECTION, SOLUTION INTRAVENOUS at 12:30

## 2023-03-23 RX ADMIN — NITROGLYCERIN 40 MCG: 10 INJECTION INTRAVENOUS at 13:13

## 2023-03-23 RX ADMIN — HYDROMORPHONE HYDROCHLORIDE 0.4 MG: 0.2 INJECTION, SOLUTION INTRAMUSCULAR; INTRAVENOUS; SUBCUTANEOUS at 23:32

## 2023-03-23 RX ADMIN — VANCOMYCIN HYDROCHLORIDE 1000 MG: 1 INJECTION, POWDER, LYOPHILIZED, FOR SOLUTION INTRAVENOUS at 13:50

## 2023-03-23 RX ADMIN — MUPIROCIN 0.5 G: 20 OINTMENT TOPICAL at 22:30

## 2023-03-23 RX ADMIN — COAGULATION FACTOR VIIA (RECOMBINANT) 2 MG: KIT at 15:30

## 2023-03-23 RX ADMIN — DEXMEDETOMIDINE HYDROCHLORIDE 0.5 MCG/KG/HR: 400 INJECTION INTRAVENOUS at 19:40

## 2023-03-23 RX ADMIN — SODIUM CHLORIDE 7.5 G: 900 INJECTION, SOLUTION INTRAVENOUS at 13:48

## 2023-03-23 RX ADMIN — KETAMINE HYDROCHLORIDE 30 MG: 10 INJECTION, SOLUTION INTRAMUSCULAR; INTRAVENOUS at 12:39

## 2023-03-23 RX ADMIN — ROCURONIUM BROMIDE 50 MG: 50 INJECTION, SOLUTION INTRAVENOUS at 15:00

## 2023-03-23 RX ADMIN — CALCIUM CHLORIDE 0.5 G: 100 INJECTION, SOLUTION INTRAVENOUS at 15:33

## 2023-03-23 RX ADMIN — Medication 20 MG: at 13:21

## 2023-03-23 RX ADMIN — PHENYLEPHRINE HYDROCHLORIDE 100 MCG: 10 INJECTION INTRAVENOUS at 13:10

## 2023-03-23 RX ADMIN — CALCIUM GLUCONATE 1 G: 20 INJECTION, SOLUTION INTRAVENOUS at 17:58

## 2023-03-23 RX ADMIN — HEPARIN SODIUM 0.3 UNITS: 1000 INJECTION INTRAVENOUS; SUBCUTANEOUS at 15:28

## 2023-03-23 RX ADMIN — CEFAZOLIN SODIUM 2 G: 2 INJECTION, SOLUTION INTRAVENOUS at 21:32

## 2023-03-23 RX ADMIN — HEPARIN SODIUM 0.3 UNITS: 1000 INJECTION INTRAVENOUS; SUBCUTANEOUS at 15:34

## 2023-03-23 RX ADMIN — NITROGLYCERIN 60 MCG: 10 INJECTION INTRAVENOUS at 13:14

## 2023-03-23 RX ADMIN — HYDROMORPHONE HYDROCHLORIDE 0.4 MG: 0.2 INJECTION, SOLUTION INTRAMUSCULAR; INTRAVENOUS; SUBCUTANEOUS at 18:10

## 2023-03-23 ASSESSMENT — ACTIVITIES OF DAILY LIVING (ADL)
ADLS_ACUITY_SCORE: 42
ADLS_ACUITY_SCORE: 35
ADLS_ACUITY_SCORE: 39
ADLS_ACUITY_SCORE: 35

## 2023-03-23 ASSESSMENT — EJECTION FRACTION: EF_VALUE: .08

## 2023-03-23 NOTE — PLAN OF CARE
Pt comes in d/t abnormal stress test. She does walk quite a bit and states she started having chest pain with activity. She now experiences pain at rest. Ready for procedure. valium given.  Morgan at bedside.

## 2023-03-23 NOTE — PRE-PROCEDURE
Patient was seen and examined by me.  She has a pericardial effusion and a RCA lesion that has been treated with a stent.  There is still significant narrowing of the artery.  We plan to perform an emergency coronary artery bypass grafting procedure. Her  understands that the risks for this procedure include: bleeding, infection, stroke, sternal dehiscence, myocardial infarction, arrhythmias, the need for a pacemaker, prolonged ventilation, pneumonia, liver/renal failure, aortic dissection, and an operative mortality of 4 to 8 percent.  He accepts these risks and understands that she needs to go to the operating room as soon as possible.

## 2023-03-23 NOTE — ANESTHESIA POSTPROCEDURE EVALUATION
Patient: Antonia Everett    Procedure: Procedure(s):  CORONARY ARTERY BYPASS GRAFT X1 (CABG), EPIAORTIC ULTRASOUND, ENDOVEIN HARVEST FROM LEFT LOWER EXTREMITY       Anesthesia Type:  General    Note:  Disposition: ICU            ICU Sign Out: Anesthesiologist/ICU physician sign out WAS performed   Postop Pain Control: Uneventful            Sign Out: Well controlled pain   PONV: No   Neuro/Psych: Uneventful            Sign Out: PLANNED postop sedation   Airway/Respiratory: Uneventful            Sign Out: AIRWAY IN SITU/Resp. Support               Airway in situ/Resp. Support: ETT                 Reason: Planned Pre-op   CV/Hemodynamics: Uneventful            Sign Out: Acceptable CV status; No obvious hypovolemia; No obvious fluid overload   Other NRE: NONE   DID A NON-ROUTINE EVENT OCCUR? No    Event details/Postop Comments:  Patient transported intubated/sedated to the ICU with ongoing vasopressor support and balloon pump in situ. Formal handoff given to ICU providers including swelling noted at the R Radial access site. Dopplers were normal in the radial and ulnar arteries in the OR prior to transport.            Last vitals:  Vitals:    03/23/23 0645 03/23/23 0833   BP: 110/75    Pulse: 78    Resp: 18    Temp: 36.8  C (98.2  F) 36.7  C (98  F)   SpO2: 95%        Electronically Signed By: Immanuel Dunlap MD  March 23, 2023  5:02 PM

## 2023-03-23 NOTE — PROVIDER NOTIFICATION
Patient intubated by Anesthesia. Patient placed on ventilator. Tolerating well at this time.       03/23/23 1030   Ventilator - Patient    Patient Resp Rate  20 breaths/min   Expiratory Vt (ml) 442   Minute Volume (ml) 8 L/min   Peak Inspiratory Pressure (cm H2O) 21 cmH2O   Mean Airway Pressure (cm H2O) 10 cmH2O   Plateau Pressure (cm H2O) 15 cmH2O   Dynamic Compliance (mL/cm H2O) 49 mL/cm H2O   Airway Resistance 11   Auto/ Intrinsic PEEP (cm H2O) 1 cmH2O

## 2023-03-23 NOTE — ANESTHESIA PROCEDURE NOTES
Central Line/PA Catheter Placement    Pre-Procedure   Staff -        Anesthesiologist:  Immanuel Dunlap MD       Performed By: anesthesiologist       Location: OR       Pre-Anesthestic Checklist: patient identified, IV checked, site marked, risks and benefits discussed, informed consent, monitors and equipment checked, pre-op evaluation and at physician/surgeon's request  Timeout:       Correct Patient: Yes        Correct Procedure: Yes        Correct Site: Yes        Correct Position: Yes        Correct Laterality: Yes   Line Placement:   This line was placed Post Induction starting at 3/23/2023 1:20 PM and ending at 3/23/2023 1:28 PM    Procedure   Procedure: central line       Laterality: right       Insertion Site: internal jugular.       Patient Position: Trendelenburg  Sterile Prep        All elements of maximal sterile barrier technique followed       Patient Prep/Sterile Barriers: draped, hand hygiene, gloves , hat , mask , draped, gown, sterile gel and probe cover       Skin prep: Chloraprep  Insertion/Injection        1. Ultrasound was used to evaluate the access site.       2. Vein evaluated via ultrasound for patency/adequacy.       3. Using real-time ultrasound the needle/catheter was observed entering the artery/vein.       4. Permanent image was captured and entered into the patient's record.       5. The visualized structures were anatomically normal.       6. There were no apparent abnormal pathologic findings.       Introducer Type: 9 Fr, 2-lumen MAC        Type: PA/CVC with Introducer       Catheter Size: 9 Fr       Catheter Length: 11.5       Number of Lumens: double lumen       PA Catheter Type: CCO         Appropriate RV, RA and PA waveforms noted:  Yes            Balloon down at end of the procedure:   Narrative         Secured by: suture       Tegaderm dressing used.       Complications: None apparent,        blood aspirated from all lumens,        All lumens flushed: Yes       Verification  method: Placement to be verified post-op       Tip termination: right atrium   Comments:  CVL placed in RIJ and threaded to reside in the RA

## 2023-03-23 NOTE — Clinical Note
500 mL of bloody fluid removed. 350 mL of drainage upon insertion. 500 total noted in drainage bag at time of patient transfer.

## 2023-03-23 NOTE — Clinical Note
Perforation in vessel noted. Pericardiocentesis kit opened. Echo ordered. Code blue initiated. See code blue recording.

## 2023-03-23 NOTE — PROGRESS NOTES
PT was transported form the CV lab to OR. He was manually ventilated with a BVM on 100% Fi02. PT tolerated transport well.    Michael Calvillo, RT

## 2023-03-23 NOTE — BRIEF OP NOTE
[facility]    Brief Operative Note    Pre-operative diagnosis: CAD (coronary artery disease) [I25.10]  Perforation of the RCA, Pericardial Effusion  Post-operative diagnosis:Same, Bleeding from the RCA and Large hematoma over the RCA  Procedures: Emergency CABG x 1 with RSVG to RPDA, Repair of bleeding sites at right atrial-RCA junction., EVH LLE  Surgeon: Surgeon(s) and Role:     * Yessenia Zamorano MD - Primary      ^ Emiliana Dorantes STSAC - First Assistant  Anesthesia: General   Estimated Blood Loss: 350 mL  Drains: Two 32 Fr mediastinal  Specimens: None  Findings: Bloody pericardial effusion, bleeding from the RCA and RA. Good RSVG and RPDA.  Complications: None  Implants:   Implant Name Type Inv. Item Serial No.  Lot No. LRB No. Used Action   SKY STERNAL WIRE SINGLE #6 046-032 - [id number] Wire SKY STERNAL WIRE SINGLE #6 046-032  [employer] [id number] N/A 1 Implanted   SKY MYOSTERNAL WIRE 046-267 - [id number] Wire SKY MYOSTERNAL WIRE [id number]  [employer] [id number] N/A 1 Implanted

## 2023-03-23 NOTE — Clinical Note
RCA Cine(s)  injected and visualized utilizing power injector system. RCA noted to be clotted off at the proximal stent.

## 2023-03-23 NOTE — Clinical Note
Potential access sites were evaluated for patency using ultrasound.   The left femoral artery was selected. Access was obtained under with Sonosite guidance using a micropuncture 21 gauge needle with direct visualization of needle entry.

## 2023-03-23 NOTE — ANESTHESIA PROCEDURE NOTES
Perioperative JAIRON Procedure Note    Staff -        Anesthesiologist:  Immanuel Dunlap MD       Performed By: anesthesiologist  Preanesthesia Checklist:  Patient identified, IV assessed, risks and benefits discussed, monitors and equipment assessed, procedure being performed at surgeon's request and anesthesia consent obtained.    JAIRON Probe Insertion    Probe Status PRE Insertion: NO obvious damage  Probe type:  Adult 3D  Bite block used:   Soft  Insertion Technique: Easy, no oropharyngeal manipulation  Insertion complications: None obvious  Billing Report:A JAIRON report is NOT being generated.  Probe Status POST Removal: NO obvious damage

## 2023-03-23 NOTE — PROGRESS NOTES
MD RESTRAINT FOR NON-VIOLENT BEHAVIOR FACE TO FACE EVALUATION  Progress Note  Restraint Application    I recognize that restraints are physical and/or chemical interventions intended to restrict a person's movements. Restraints are currently needed to ensure the safety of this patient and/or others. My clinical rationale appears below.    PATIENT EVALUATION  Patient evaluated on 03/23/2023 at 5:13 PM to confirm the need for medical restraints.  --  Category/Type of Restraint:  NON-VIOLENT  --  Patient's Immediate Situation:  Patient demonstrated the following behaviors: Pulling/tugging at invasive lines or tubes and does not respond to verbal/non-verbal redirection  --  Patient's Reaction to the intervention:  Does patient understand the reason for restraint/seclusion? No  --  Medical Condition:  Is there any evidence of compromise of Skin integrity, Respiratory, Cardiovascular, Musculoskeletal, Hydration? Yes  Skin integrity, Respiratory and Cardiovascular  --  Root Cause of the Behavior:  Acute respiratory failure, acute encephalopathy related to therapeutic sedation  --  Behavioral Condition:  In consultation with the RN, is there a need to continue this restraint or seclusion? Yes  --  Criteria for Release from the Restraint:  Patient is calm, listens to verbal redirection, and stops attempting to pull out lines/tubes    See Restraint Flowsheet for complete restraint documentation and assessment.    Caprice Paez MD, MPH  Pulmonary & Critical Care Medicine  03/23/2023  5:13 PM

## 2023-03-23 NOTE — Clinical Note
Single balloon inflation. The first balloon was inserted into the right coronary artery and proximal right coronary artery.Max pressure = 2 ana. Total duration = 38 seconds.

## 2023-03-23 NOTE — PRE-PROCEDURE
GENERAL PRE-PROCEDURE:   Procedure:  Coronary angiogram with possible PCI  Date/Time:  3/23/2023 7:01 AM    Written consent obtained?: Yes    Risks and benefits: Risks, benefits and alternatives were discussed    Consent given by:  Patient  Patient states understanding of procedure being performed: Yes    Patient's understanding of procedure matches consent: Yes    Procedure consent matches procedure scheduled: Yes    Expected level of sedation:  Moderate  Appropriately NPO:  Yes  ASA Class:  3 (abnormal stress test, abnormal coronary CTA, angina, RA)  Mallampati  :  Grade 3- soft palate visible, posterior pharyngeal wall not visible  Lungs:  Crackles right base  Heart:  Normal heart sounds and rate  History & Physical reviewed:  History and physical reviewed and no updates needed  Statement of review:  I have reviewed the lab findings, diagnostic data, medications, and the plan for sedation

## 2023-03-23 NOTE — PROGRESS NOTES
RT PROGRESS NOTE    VENT DAY# 1    CURRENT SETTINGS:   Vent Mode: CMV/AC  (Continuous Mandatory Ventilation/ Assist Control)  FiO2 (%): 80 %  Resp Rate (Set): 16 breaths/min  Tidal Volume (Set, mL): 450 mL (per provider order)  PEEP (cm H2O): 5 cmH2O  Resp: 18      PATIENT PARAMETERS:  PIP 23  Pplat:  16  Secretions:  none  02 Sats: 100%  BS: clear bilaterally    ETT SIZE 7.5 Secured at 22 cm at teeth/gums  Emergency Ambu bag, mask and peep valve at bedside.       NOTE / SHIFT SUMMARY:     Today's Changes  Patient arrived from OR and was placed on ventilator.ETC02 in place along with HME. Silk ties where changed to a commercial tube patel with bite block, patient has known tongue trauma.   Patient to remain on settings seen above.    Skin checked and tube moved Q2, this responsibility is shared between RN and RT. Endotracheal tube cuff assessed and appropriate at the time of the assessment. RT to continue to assess and monitor cardiopulmonary status while in the ICU.    Lynette Alcala, RT

## 2023-03-23 NOTE — Clinical Note
The first balloon was inserted into the right coronary artery and proximal right coronary artery.Max pressure = 6 ana. Total duration = 60 seconds.

## 2023-03-23 NOTE — PROGRESS NOTES
SPIRITUAL HEALTH SERVICES NOTE  [facility]; Cath Lab    SPIRITUAL ASSESSMENT   appropriately concerned  Reports good support from extended family/community    FULL SPIRITUAL CARE NOTE:   Responded to Salena Harper. Met with Erika's  Morgan. He was understandably shaken to learn that Erika's heart stopped and needed to be restarted during surgery. Sat with him while he received updates from Yvonne Falcon/OKSANA, Dr. Zamorano, and Dr. Powers.     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 He spoke about some of the many health challenges Erika has had over the years, including 76 surgeries, due to a surgical mistake. He also notes that she spent over 3 years dealing with abdominal abscesses/infections. He says that,despite her health issues, Erika never complains. He talked about some of the challenges and joys they have experienced together. Morgan and Erika adopted 5 children, 3 of whom have special needs. Eirka and Morgan are Lutheran and have multiple family members praying for them. Prayer shared.     Followed-up with Morgan throughout the day as Erika underwent a CABG with Dr. Zamorano.    Time Spent with Patient/Family: 55 minutes    PLAN OF CARE: Will follow-up with Erika and Morgan after surgery     Jaki Al M.Div.      Office: [phone] (for non-urgent requests)  Please Vocera or page through [facility] for time-sensitive requests

## 2023-03-23 NOTE — Clinical Note
Unless otherwise noted, the J wire was used to insert, remove, exchange, and reposition all catheters. Bentson wire used for initial insertion.

## 2023-03-23 NOTE — Clinical Note
The first balloon was inserted into the right coronary artery and middle right coronary artery.Max pressure = 4 ana. Total duration = 10 seconds.     Max pressure = 4 ana. Total duration = 22 seconds.    Balloon reinflated a second time: Max pressure = 4 ana. Total duration = 22 seconds.

## 2023-03-23 NOTE — Clinical Note
Emerge inserted to provide support for guidewire and to advance the guidewire to optimal interventional position.

## 2023-03-23 NOTE — Clinical Note
The first balloon was inserted into the right coronary artery and middle right coronary artery.Max pressure = 12 ana. Total duration = 20 seconds.

## 2023-03-23 NOTE — ANESTHESIA PREPROCEDURE EVALUATION
Anesthesia Pre-Procedure Evaluation    Patient: Antonia Everett   MRN: [id number] : 1950        Procedure : Procedure(s):  CORONARY ARTERY BYPASS GRAFT (CABG)          Past Medical History:   Diagnosis Date     Bleeding duodenal ulcer 2010     Chronic diarrhea 2017    Colonoscopy normal except inflammatory polyp or     Chronic left shoulder pain 2017     Chronic low back pain without sciatica 11/3/2017     Chronic pain      Chronic radicular low back pain     Pain clinic.  Nerve stimulator placement     Complete tear of left rotator cuff 4/3/2018     Duodenal ulcer     bleeding     Family history of breast cancer      GERD (gastroesophageal reflux disease)      H/O ulcer disease     secondary to medications     Herpes zoster      History of transfusion      Hypercholesterolemia      LA (lupus anticoagulant) disorder (H)      Left hip pain     bursitis, evaluated by orthopedics     Lichen sclerosus of female genitalia 2020     MRSA (methicillin resistant staph aureus) culture positive     betweeen  and      Oral lichen planus 2017     Plantar fasciitis      Raynaud's disease without gangrene 2018    Involving both feet, describing burning sensation, redness and coolness to touch     Rheumatoid arthritis (H)     Followed by rheumatology, Dr. Felipe,      Screening     DEXA normal T score -0.9 , minimal change compared to previous DEXA     VRE (vancomycin resistant enterococcus) culture positive     between  and       Past Surgical History:   Procedure Laterality Date     ABDOMINAL HERNIA REPAIR       APPENDECTOMY       ARTHROSCOPY SHOULDER ROTATOR CUFF REPAIR Right      ARTHROSCOPY SHOULDER ROTATOR CUFF REPAIR Left 2018     BACK SURGERY       BIOPSY BREAST Left      COLONOSCOPY      Normal colonoscopy May 2017 except inflammatory polyp     ESOPHAGOSCOPY, GASTROSCOPY, DUODENOSCOPY (EGD), COMBINED N/A 2018    Procedure: ESOPHAGOGASTRODUODENOSCOPY  (EGD) with biopsy and foreign body removal;  Surgeon: Raymond Triana MD;  [facility];  Service: Gastroenterology     HC REMOVAL OF TONSILS,<14 Y/O      Description: Tonsillectomy;  Recorded: 2011;     HYSTERECTOMY      LUIS ANGEL with BSO     HYSTERECTOMY TOTAL ABDOMINAL, BILATERAL SALPINGO-OOPHORECTOMY, COMBINED       KNEE SURGERY Right 2007    Arthroscopic knee surgery     OPEN REDUCTION INTERNAL FIXATION ELBOW  2014     MD ARTHRODESIS ANT INTERBODY MIN DISCECTOMY,LUMBAR      Description: Lumbar Vertebral Fusion;  Recorded: 2011;  Comments: L5-S1 fusion     MD STEREOTACT STIM SPINAL CORD      Description: Spinal Stereotaxis Stimulation Of Cord;  Recorded: 2011;  Comments: implanted 2007 with lead revision      SKIN GRAFT        Allergies   Allergen Reactions     Codeine Unknown     nausea     Gabapentin Unknown     Fatigue       Pregabalin Unknown     Fatigue        Social History     Tobacco Use     Smoking status: Former     Types: Cigarettes     Quit date: 1970     Years since quittin.9     Smokeless tobacco: Never   Substance Use Topics     Alcohol use: No      Wt Readings from Last 1 Encounters:   23 80.7 kg (178 lb)        Anesthesia Evaluation   Pt has had prior anesthetic.         ROS/MED HX  ENT/Pulmonary:  - neg pulmonary ROS     Neurologic:  - neg neurologic ROS     Cardiovascular: Comment: EKG  Sinus rhythm   Minimal voltage criteria for LVH, may be normal variant   Borderline ECG   When compared with ECG of 09-MAY-2016 14:16,   No significant change was found   Confirmed by MCKAY TANG, [facility] [place] LOC:JN ([id number]) on 3/23/2023 8:10:01 AM     Echo 3/13/23:  Interpretation Summary    Left ventricular size, wall motion and function are normal. The ejection  fraction is 60-65%.  Normal right ventricle size and systolic function.  No hemodynamically significant valvular abnormalities on 2D or color flow  imaging.      (+) Dyslipidemia --CAD (Ruptured RCA in  Cath lab) angina--- (-) murmur and wheezes   METS/Exercise Tolerance: >4 METS    Hematologic:  - neg hematologic  ROS     Musculoskeletal: Comment: Rheumatoid Arthritis  (+) arthritis,     GI/Hepatic:     (+) GERD,     Renal/Genitourinary:  - neg Renal ROS     Endo:  - neg endo ROS     Psychiatric/Substance Use:  - neg psychiatric ROS     Infectious Disease:  - neg infectious disease ROS     Malignancy:  - neg malignancy ROS     Other:  - neg other ROS          Physical Exam    Airway        Mallampati: II   TM distance: > 3 FB   Neck ROM: full   Mouth opening: > 3 cm    Respiratory Devices and Support         Dental       (+) Modest Abnormalities - crowns, retainers, 1 or 2 missing teeth      Cardiovascular          Rhythm and rate: regular and normal (-) no murmur    Pulmonary           breath sounds clear to auscultation   (-) no wheezes        OUTSIDE LABS:  CBC:   Lab Results   Component Value Date    WBC 3.5 (L) 03/22/2023    WBC 3.6 (L) 06/24/2021    HGB 13.1 03/22/2023    HGB 14.0 06/24/2021    HCT 40.8 03/22/2023    HCT 42.9 06/24/2021     03/22/2023     06/24/2021     BMP:   Lab Results   Component Value Date     03/22/2023     02/16/2023    POTASSIUM 4.5 03/22/2023    POTASSIUM 4.5 02/16/2023    CHLORIDE 109 (H) 03/22/2023    CHLORIDE 104 02/16/2023    CO2 26 03/22/2023    CO2 25 02/16/2023    BUN 21 03/22/2023    BUN 23.9 (H) 02/16/2023    CR 0.84 03/22/2023    CR 0.91 02/16/2023    GLC 97 03/22/2023     (H) 02/16/2023     COAGS: No results found for: PTT, INR, FIBR  POC: No results found for: BGM, HCG, HCGS  HEPATIC:   Lab Results   Component Value Date    ALBUMIN 4.7 02/16/2023    PROTTOTAL 7.5 02/16/2023    ALT 20 02/16/2023    AST 29 02/16/2023    ALKPHOS 41 02/16/2023    BILITOTAL 0.5 02/16/2023     OTHER:   Lab Results   Component Value Date    PH 7.39 03/23/2023    A1C 5.8 (H) 02/16/2023    LULU 9.7 03/22/2023    TSH 2.23 09/13/2022       Anesthesia Plan    ASA  Status:  4, emergent       Anesthesia Type: General.     - Airway: ETT   Induction: Intravenous, Propofol.   Maintenance: Inhalation.   Techniques and Equipment:     - Lines/Monitors: JAIRON, 2nd IV, Arterial Line, Central Line, PAC, CVP            JAIRON Absolute Contra-indication: NONE            JAIRON Relative Contra-indication: NONE     - Blood: Blood in Room, PRBC, FFP, Cell Saver     Consents    Anesthesia Plan(s) and associated risks, benefits, and realistic alternatives discussed. Questions answered and patient/representative(s) expressed understanding.    - Discussed:     - Discussed with:  Implied consent/emergency      - Patient is DNR/DNI Status: No         Postoperative Care    Pain management: IV analgesics, Oral pain medications, Multi-modal analgesia.   PONV prophylaxis: Ondansetron (or other 5HT-3), Dexamethasone or Solumedrol     Comments:    Other Comments: Patient transferred EMERGENTLY from the Cath lab    Patient intubated emergent in the Cath lab and balloon pump placed via L femoral artery  Pericardial drain in place with 1L BRB output  Pressure dressing on R Radial access point.    Arterial line ASAP upon arrival  CVC w/ PA Catheter.  Epinephrine, phenylephrine and nicardipine gtt in line with norepinephrine and vasopressin readily available.  Dexmedetomidine 0.5 mcg/kg/min infusion and methadone 0.3 mg/kg IV bolus with induction.  JAIRON placement and monitoring.  Potential needs for blood product transfusion.  Plan to transport postoperatively to ICU intubated and sedated.             Immanuel Dunlap MD

## 2023-03-23 NOTE — Clinical Note
The first balloon was inserted into the right coronary artery and proximal right coronary artery.Max pressure = 4 ana. Total duration = 10 seconds.     Shockwave therapy for 8 treatments. .

## 2023-03-23 NOTE — Clinical Note
Stent deployed in the proximal right coronary artery. Max pressure = 8 ana. Total duration = 19 seconds.

## 2023-03-23 NOTE — Clinical Note
The first balloon was inserted into the right coronary artery and middle right coronary artery.Max pressure = 4 ana. Total duration = 20 seconds.     Max pressure = 6 ana. Total duration = 20 seconds.    Balloon reinflated a second time: Max pressure = 6 ana. Total duration = 20 seconds.  Balloon reinflated a third time: Max pressure = 6 ana. Total duration = 20 seconds.  Balloon reinflated a fourth time: Max pressure = 6 ana. Total dur ation = 20 seconds.

## 2023-03-23 NOTE — Clinical Note
The first balloon was inserted into the right coronary artery and proximal right coronary artery.Max pressure = 2 ana.

## 2023-03-23 NOTE — Clinical Note
Stent deployed in the middle right coronary artery. Max pressure = 16 ana. Total duration = 30 seconds.

## 2023-03-23 NOTE — DISCHARGE INSTRUCTIONS

## 2023-03-23 NOTE — Clinical Note
The first balloon was inserted into the right coronary artery and proximal right coronary artery.Max pressure = 4 ana. Total duration = 60 seconds.

## 2023-03-23 NOTE — Clinical Note
The first balloon was inserted into the right coronary artery and proximal right coronary artery.Max pressure = 12 ana. Total duration = 20 seconds.     Max pressure = 12 ana. Total duration = 20 seconds.    Balloon reinflated a second time: Max pressure = 12 ana. Total duration = 20 seconds.  Balloon reinflated a third time: Max pressure = 12 ana. Total duration = 20 seconds.

## 2023-03-23 NOTE — ANESTHESIA PROCEDURE NOTES
Airway       Patient location during procedure: OR       Procedure Start/Stop Times: 3/23/2023 10:30 AM  Staff -        CRNA: Manoj Bella APRN CRNA       Other Anesthesia Staff: Ion Bender APRN CRNA       Performed By: CRNAIndications and Patient Condition       Indications for airway management: gaye-procedural and cardiovascular arrest         Mask difficulty assessment: 0 - not attempted    Final Airway Details       Final airway type: endotracheal airway       Successful airway: ETT - single  Endotracheal Airway Details        ETT size (mm): 7.5       Cuffed: yes       Successful intubation technique: video laryngoscopy       VL Blade Size: Glidescope 3       Grade View of Cords: 1       Adjucts: stylet       Position: Right       Measured from: lips       Secured at (cm): 22       Bite block used: None    Post intubation assessment        Placement verified by: capnometry and equal breath sounds        Number of attempts at approach: 1       Number of other approaches attempted: 0       Ease of procedure: easy       Dentition: Intact and Unchanged    Medication(s) Administered   Medication Administration Time: 3/23/2023 10:30 AM

## 2023-03-23 NOTE — Clinical Note
The first balloon was inserted into the right coronary artery and proximal right coronary artery.Max pressure = 6 ana. Total duration = 20 seconds.     Max pressure = 6 ana. Total duration = 20 seconds.    Balloon reinflated a second time: Max pressure = 6 ana. Total duration = 20 seconds.

## 2023-03-23 NOTE — INTERVAL H&P NOTE
"I have reviewed the surgical (or preoperative) H&P that is linked to this encounter, and examined the patient. There are no significant changes    Clinical Conditions Present on Arrival:  Clinically Significant Risk Factors Present on Admission                    # Overweight: Estimated body mass index is 27.06 kg/m  as calculated from the following:    Height as of this encounter: 1.727 m (5' 8\").    Weight as of this encounter: 80.7 kg (178 lb).       "

## 2023-03-23 NOTE — Clinical Note
Balloon prepped per 's instructions. [facility] balloon.

## 2023-03-23 NOTE — Clinical Note
Single balloon inflation. The first balloon was inserted into the right coronary artery and proximal right coronary artery.Max pressure = 6 ana. Total duration = 20 seconds.

## 2023-03-23 NOTE — Clinical Note
The first balloon was inserted into the right coronary artery and proximal right coronary artery.Max pressure = 2 ana. Total duration = 40 seconds.

## 2023-03-23 NOTE — ANESTHESIA CARE TRANSFER NOTE
Patient: Antonia Everett    Procedure: * No procedures listed *       Diagnosis: * No pre-op diagnosis entered *  Diagnosis Additional Information: No value filed.    Anesthesia Type:   No value filed.     Note:    Oropharynx: endotracheal tube in place      Level of Supplemental Oxygen (L/min / FiO2): 15  Independent Airway: airway patency not satisfactory and stable  Dentition: dentition unchanged                Vitals:  Vitals Value Taken Time   BP     Temp     Pulse     Resp     SpO2         Electronically Signed By: MICHELLE Nagy CRNA  March 23, 2023  10:45 AM

## 2023-03-23 NOTE — Clinical Note
The first balloon was inserted into the right coronary artery and proximal right coronary artery.Max pressure = 12 ana. Total duration = 29 seconds.     Max pressure = 12 ana. Total duration = 30 seconds.    Balloon reinflated a second time: Max pressure = 12 ana. Total duration = 30 seconds.

## 2023-03-23 NOTE — PLAN OF CARE
Goal Outcome Evaluation:    Problem: Mechanical Ventilation Invasive  Goal: Effective Communication  Outcome: Progressing  Goal: Optimal Device Function  Outcome: Progressing  Intervention: Optimize Device Care and Function  Flowsheets (Taken 3/23/2023 1808)  Airway Safety Measures:   all equipment/monitors on and audible   manual resuscitator/mask/valve at bedside   oxygen flowmeter   suction at bedside   suction equipment   manual resuscitator/mask/valve in room   emergency medication sheet   mask at bedside   suction regulator  Goal: Mechanical Ventilation Liberation  Outcome: Progressing  Goal: Absence of Device-Related Skin and Tissue Injury  Outcome: Progressing  Goal: Absence of Ventilator-Induced Lung Injury  Outcome: Progressing  Intervention: Facilitate Lung-Protection Measures  Flowsheets (Taken 3/23/2023 1808)  Lung Protection Measures:   lung compliance monitored   ventilator synchrony promoted   plateau/inspiratory pressure monitored   ventilator waveforms monitored          Lynette Alcala, RT

## 2023-03-23 NOTE — Clinical Note
The first balloon was inserted into the right coronary artery and middle right coronary artery.Max pressure = 6 ana. Total duration = 20 seconds.     Max pressure = 6 ana. Total duration = 20 seconds.    Balloon reinflated a second time: Max pressure = 6 ana. Total duration = 20 seconds.  Balloon reinflated a third time: Max pressure = 6 ana. Total duration = 20 seconds.  Balloon reinflated a fourth time: Max pressure = 12 ana. Total du ration = 20 seconds.  Balloon reinflated a fourth time: Max pressure = 12 ana. Total duration = 22 seconds.

## 2023-03-23 NOTE — ANESTHESIA PROCEDURE NOTES
Arterial Line Procedure Note    Pre-Procedure   Staff -        Anesthesiologist:  Immanuel Dunlap MD       Performed By: anesthesiologist       Location: OR       Pre-Anesthestic Checklist: patient identified, IV checked, risks and benefits discussed, informed consent, monitors and equipment checked, pre-op evaluation and at physician/surgeon's request  Timeout:       Correct Patient: Yes        Correct Procedure: Yes        Correct Site: Yes        Correct Position: Yes   Line Placement:   This line was placed Pre Induction starting at 3/23/2023 12:46 PM and ending at 3/23/2023 12:50 PM  Procedure   Procedure: arterial line       Laterality: left       Insertion Site: radial.  Sterile Prep        Standard elements of sterile barrier followed       Skin prep: Chloraprep  Insertion/Injection        Technique: ultrasound guided and Seldinger Technique        1. Ultrasound was used to evaluate the access site.       2. Artery evaluated via ultrasound for patency/adequacy.       3. Using real-time ultrasound the needle/catheter was observed entering the artery/vein.       4. Permanent image was captured and entered into the patient's record.       Catheter Type/Size: 20 G, 12 cm  Narrative         Secured by: suture       Tegaderm dressing used.       Complications: None apparent,        Arterial waveform: Yes

## 2023-03-23 NOTE — PROCEDURES
Brief electrophysiology note    Mrs. Antonia Everett is a 72 year old female with coronary artery disease, rheumatoid arthritis presenting for coronary angiography and possible PCI.  In course of PCI, note of hypotension progressing to PEA arrest with slight contrast staining within the pericardial space was noted.  Emergent bedside TTE showed pericardial effusion with right atrial collapse.  Emergent subxiphoid pericardiocentesis was performed with aspiration of 350ml blood.  Over 10 serial minutes of observation, approximately 5-10ml additional pericardial blood was aspirated.  Bedside TTE showed no ongoing pericardial blood.  The pericardial drain was left in situ and secured to the skin with a 2-0 silk suture.    Lesa Dawkins MD  3/23/2023  11:17 AM

## 2023-03-23 NOTE — H&P
[facility]:  CRITICAL CARE HISTORY & PHYSICAL NOTE     Date / Time of Admission:  3/23/2023  6:28 AM    ID: Antonia Everett is a 72 year old female with rheumatoid arthritis, on chronic immunosuppression with Plaquenil, age-related osteoporosis, GERD without esophagitis, prediabetes, mixed hyperlipidemia, and abnormal coronary CT angiogram with unstable angina who presented to [facility] for elective coronary angiogram on 3/23/2023, subsequently complicated by RCA mid-stent RCA coronary perforation, transient PEA arrest x 5 min, pericardial tamponade s/p emergent pericardial drain, cardiogenic shock s/p IABP placement, and emergent 1-vessel CABG 3/23.  Now admitted to the ICU for post-procedure care.          Changes for today: 1.   Underwent pericardial drain placement and IABP placement after RCA perforation.  2. Intubated in cath lab.  3. Went to OR for 1 v CABG.  4. ABG reviewed - increase TV from 380 mL to 450 mL.  Awaiting CXR        Assessment/Plan:   Neurologic: Acute metabolic encephalopathy.  In setting of therapeutic sedation, metabolic derangements. Had transient cardiac arrest, appeared to be moving extremities post-event. Post-operative pain.  Received methadone 20 mg IV in OR on 3/23.    Precedex gtt as needed for sedation, RASS goal 0/-1.     Scheduled Tylenol 975 mg Q8H x 10 days    Pain control: PRNs     Pulmonary: Acute respiratory failure with hypoxia.  Intubated in setting of transient cardiac arrest 3/23, pH slightly acidotic initially in the cath lab.     Wean supplemental O2 as tolerated; goal O2 sat > 92%.  HOB > 30 degrees to limit aspiration risk.      Vent: CMV, RR 16,  mL => 450 mL (7 mL/kg IBW), PEEP 5, FiO2 100%.     ABG 30 minutes after arrival: 7.3 2/46/248/24.    Increase tidal volume from 380 mL to 450 mL.    Follow-up chest x-ray.    Chest tubes to suction, management per CT surgery team.     Cardiovascular: Cardiogenic shock in the setting of  ruptured RCA, status post pericardiocentesis and IABP placement 3/23.  Required pericardiocentesis emergently, 350 mL removed upon drain placement. Unstable angina with multivessel coronary artery disease, ruptured RCA with transient PEA arrest status post emergent one-vessel CABG 3/23. Patient presented today for elective coronary angiogram.  Found to have mild LAD disease, mild-moderate diagnoal disease, extremly tortuous and calicified RCA with moderate proximal stenosis and severe serial mid-stenosis lesions. Mid-RCA lesions were dilated serially with balloon, and the more distal mid-RCA lesion had BRITTANY placed x 1.  Then the more proximal mid-RCA lesion was stented pos-stenting images showed coronary perforation. Balloon was dilated across lesion did not seal perforation and patient decompensated, had PEA cardiac arrest.  Received manual CPR x 5 min.  Given Epi 1 mg IV x 2 total, sodium bicarb 50 mEq IV after ABG pH 7.28.  Intubated by anesthesia team, found to have some biting and bruising to tongue.  Perforation then treated with covered stent, bleeding stopped.  Subxiphoid pericardiocentesis performed, 350 mL blood removed with no further bleeding noted.   CT surgery consulted, taken to the OR for emergent bypass grafting x1.  Intra-aortic balloon pump placed prior to transfer to the OR. History of mixed hyperlipidemia.    Cardiac monitoring.  SBP 90 - 130 mmHg, MAP >= 65 mmHg.  EKG PRN.    Epinephrine gtt as needed for BP goals. Plan to keep it on overnight per CT surgery discussion.    IABP 1:1 augmentation    ASA, statin per protocol.      GI/: GERD without esophagitis. On PPI at baseline.    Nutrition: NPO.     Last BM:  None.  Meds: Miralax senna/colace BID.    GI prophylaxis: PPI daily.     Renal: Hyperchloremia. Metabolic acidosis, improved.      Monitor I/O's.  Electrolyte repletion PRN.  Avoid/limit nephrotoxic agents.    IVFs: LR per CT surgery protocol.     Heme/Onc: Anemia of acute blood loss.   "EBL greater than 500 mL after pericardial drain and prior to the OR.   mL intraoperatively.  Leukocytosis.  Likely stress-induced, no evidence of active infection.  Received 386 mL Cell Saver in the OR 3/23.  Transient thrombocytopenia.    DVT prophylaxis: heparin SQ.     Endocrine: History of prediabetes.  Hemoglobin A1c 5.8% on 2/16/2023.    FSBG and insulin infusion per CT surgery protocol. Hypoglycemia protocol.     Infectious diseases: History of MRSA.  Did receive vancomycin IV x1 intraoperatively.    Perioperative antibiotics with cefazolin.    Rheum/Musculoskeletal: History of rheumatoid arthritis, on chronic immunosuppression with Plaquenil.  Is on biologic at baseline, no recent flares.  History of osteoporosis.    Activity:  Bedrest    Risk Factors Present on Admission:  Clinically Significant Risk Factors Present on Admission          # Hypocalcemia: Lowest iCa = 0.93 mg/dL in last 2 days, will monitor and replace as appropriate      # Coagulation Defect: INR = 1.64 (Ref range: 0.85 - 1.15) and/or PTT = 99 Seconds (Ref range: 22 - 38 Seconds), will monitor for bleeding         # Overweight: Estimated body mass index is 27.06 kg/m  as calculated from the following:    Height as of this encounter: 1.727 m (5' 8\").    Weight as of this encounter: 80.7 kg (178 lb).        # Anemia: based on hgb <11       Lines/Drains/Tubes:  7.5 mm endotracheal tube, placed 3/23  Right internal jugular Introducer/PA cath central line, placed 3/23  Left radial A-line, placed 3/23  Left femoral IABP, placed 3/23  32 Gabonese anterior mediastinal chest tube x 2, placed 3/23  Case catheter, placed 3/23     Code Status:  Full Code     The patient and/or the family was educated about the above plan of care and indicated understanding.  Family to be at bedside later today, will follow-up on arrival.     This patient is considered critically ill and requires ICU level of care due to acute respiratory failure requiring " mechanical ventilation, ruptured RCA with associated tamponade requiring emergent CABG, circulatory shock on vasopressors.     Total Critical Care time, not including separate billable procedure time: 65 minutes.    Caprice Paez MD, MPH  Pulmonary/Critical Care Medicine  03/23/2023   4:54 PM         ICU DAILY CHECKLIST:   ICU DAILY CHECKLIST           Can patient transfer out of MICU? no    FAST HUG:    Feeding:  no.  Patient is receiving NPO    Case: yes  Analgesia/Sedation: yes; Dexmedetomidine   Thromboembolic prophylaxis: yes; Mode:  Heparin  HOB>30:  yes  Stress Ulcer Protocol Active: yes; Mode: PPI  Glycemic Control: Any glucose > 180 yes; Mode of Insulin Therapy: Insulin gtt    INTUBATED:  Can patient have daily waking:  Yes  Can patient have spontaneous breathing trial:  Yes    Restraints? yes    PHYSICAL THERAPY AND MOBILITY:  Can patient have PT and mobility trial: no  Activity: Bedrest     Chief Complaint No chief complaint on file.            HPI:   Antonia Everett is a 72 year old female with rheumatoid arthritis, on chronic immunosuppression with Plaquenil, age-related osteoporosis, GERD without esophagitis, prediabetes, mixed hyperlipidemia, and abnormal coronary CT angiogram with unstable angina who presented to [facility] for elective coronary angiogram on 3/23/2023, subsequently complicated by RCA mid-stent RCA coronary perforation, transient PEA arrest x 5 min, pericardial tamponade s/p emergent pericardial drain, cardiogenic shock s/p IABP placement, and emergent 1-vessel CABG 3/23.  Now admitted to the ICU for post-procedure care.  History is provided by the interventional cardiology team, CT surgery team, anesthesiology team, and also I was present for care during/after cardiac arrest in cath lab.    Patient presented today for elective coronary angiogram.  Found to have mild LAD disease, mild-moderate diagnoal disease, extremly tortuous and calicified RCA with moderate proximal  stenosis and severe serial mid-stenosis lesions. Mid-RCA lesions were dilated serially with balloon, and the more distal mid-RCA lesion had BRITTANY placed x 1.  Then the more proximal mid-RCA lesion was stented pos-stenting images showed coronary perforation. Balloon was dilated across lesion did not seal perforation and patient decompensated, had PEA cardiac arrest.  Received manual CPR x 5 min.  Given Epi 1 mg IV x 2 total, sodium bicarb 50 mEq IV after ABG pH 7.28.  Intubated by anesthesia team, found to have some biting and bruising to tongue.  Perforation then treated with covered stent, bleeding stopped.  Subxiphoid pericardiocentesis performed, 350 mL blood removed with no further bleeding noted.    After this, patient developed inferior ST elevation with hypotension, found to have thrombosis of her covered stent, this was treated with balloon angioplasty and restoration of flow.  CT surgery consulted, taken to the OR for emergent bypass grafting x1.  Intra-aortic balloon pump placed prior to transfer to the OR.    In the OR, patient underwent emergent CABG x1 with our SVG to RPDA repair.   mL.  Found to have bloody pericardial effusion, bleeding from the RCA and the right atrium.  Also with bleeding while coming off pump.        Medications:  Fentanyl 100 mcg, methadone 20 mg, ketamine 50 mg, rocuronium 150 mg, cefazolin 2 g, vancomycin 1000 mg, sodium bicarb 50 mEq, calcium 0.5 mg, amiodarone 150 mg, factor VIIa 2 mg,    Intake: Albumin 5% 500 mL, NS 1700 mL, Cell Saver 386 mL    Output:  mL, 1700 mL urine output    Upon ICU transfer, insulin infusion at 2 units/h, Precedex gtt, epinephrine gtt infusing.        Review of Systems:   Review of Systems:  Review of systems is not obtainable due to patient factors - intubation and sedation        Medical/Surgical History:     Past Medical History:   Diagnosis Date     Bleeding duodenal ulcer 2010     Chronic diarrhea 04/20/2017    Colonoscopy normal  except inflammatory polyp or     Chronic left shoulder pain 9/26/2017     Chronic low back pain without sciatica 11/3/2017     Chronic pain      Chronic radicular low back pain     Pain clinic.  Nerve stimulator placement     Complete tear of left rotator cuff 4/3/2018     Duodenal ulcer     bleeding     Family history of breast cancer      GERD (gastroesophageal reflux disease)      H/O ulcer disease     secondary to medications     Herpes zoster 2010     History of transfusion      Hypercholesterolemia      LA (lupus anticoagulant) disorder (H)      Left hip pain 2015    bursitis, evaluated by orthopedics     Lichen sclerosus of female genitalia 6/16/2020     MRSA (methicillin resistant staph aureus) culture positive     betweeen 1996 and 2000     Oral lichen planus 4/20/2017     Plantar fasciitis      Raynaud's disease without gangrene 11/20/2018    Involving both feet, describing burning sensation, redness and coolness to touch     Rheumatoid arthritis (H)     Followed by rheumatology, Dr. Felipe,      Screening     DEXA normal T score -0.9 2016, minimal change compared to previous DEXA     VRE (vancomycin resistant enterococcus) culture positive     between 1996 and 2000      Past Surgical History:   Procedure Laterality Date     ABDOMINAL HERNIA REPAIR       APPENDECTOMY       ARTHROSCOPY SHOULDER ROTATOR CUFF REPAIR Right      ARTHROSCOPY SHOULDER ROTATOR CUFF REPAIR Left 04/2018     BACK SURGERY       BIOPSY BREAST Left      COLONOSCOPY      Normal colonoscopy May 2017 except inflammatory polyp     ESOPHAGOSCOPY, GASTROSCOPY, DUODENOSCOPY (EGD), COMBINED N/A 12/16/2018    Procedure: ESOPHAGOGASTRODUODENOSCOPY (EGD) with biopsy and foreign body removal;  Surgeon: Raymond Triana MD;  [facility];  Service: Gastroenterology     HC REMOVAL OF TONSILS,<13 Y/O      Description: Tonsillectomy;  Recorded: 09/30/2011;     HYSTERECTOMY      LUIS ANGEL with BSO     HYSTERECTOMY TOTAL ABDOMINAL, BILATERAL  SALPINGO-OOPHORECTOMY, COMBINED       KNEE SURGERY Right 2007    Arthroscopic knee surgery     OPEN REDUCTION INTERNAL FIXATION ELBOW  2014     IL ARTHRODESIS ANT INTERBODY MIN DISCECTOMY,LUMBAR      Description: Lumbar Vertebral Fusion;  Recorded: 09/30/2011;  Comments: L5-S1 fusion     IL STEREOTACT STIM SPINAL CORD      Description: Spinal Stereotaxis Stimulation Of Cord;  Recorded: 09/30/2011;  Comments: implanted 8/6/2007 with lead revision 2011     SKIN GRAFT  1996            Allergies/Medications:   Allergies:     Allergies   Allergen Reactions     Codeine Unknown     nausea     Gabapentin Unknown     Fatigue       Pregabalin Unknown     Fatigue         OUTPATIENT Medications:  Medications Prior to Admission   Medication Sig Dispense Refill Last Dose     aspirin 81 MG EC tablet Take 81 mg by mouth daily   3/23/2023     atorvastatin (LIPITOR) 40 MG tablet Take 1 tablet (40 mg) by mouth daily 90 tablet 3 3/22/2023     hydroxychloroquine (PLAQUENIL) 200 mg tablet [HYDROXYCHLOROQUINE (PLAQUENIL) 200 MG TABLET] Take 400 mg by mouth daily.    3/22/2023     isosorbide mononitrate (IMDUR) 30 MG 24 hr tablet Take 1 tablet (30 mg) by mouth daily 34 tablet 3 3/23/2023     sulfaSALAzine (AZULFIDINE ENTAB) 500 MG EC tablet [SULFASALAZINE (AZULFIDINE ENTAB) 500 MG EC TABLET] 1,500 mg 2 (two) times a day.    3/23/2023     celecoxib (CELEBREX) 200 MG capsule Take 1 capsule (200 mg) by mouth 2 times daily (Patient not taking: Reported on 3/16/2023) 180 capsule 3      diclofenac sodium 3 % Gel [DICLOFENAC SODIUM 3 % GEL] 4 g knees three times aday  and 2 g on hands or lebows three times a day 3 Tube 3      estradiol (ESTRACE) 0.1 MG/GM vaginal cream Place 0.5 g vaginally daily 42.5 g 11      GOLIMUMAB (SIMPONI ARIA IV) [GOLIMUMAB (SIMPONI ARIA IV)] Infuse into a venous catheter every 2 (two) months.         Lactobacillus rhamnosus GG (CULTURELLE) 10-15 Billion cell capsule [LACTOBACILLUS RHAMNOSUS GG (CULTURELLE) 10-15 BILLION  CELL CAPSULE] Take 1 capsule by mouth daily.        multivitamin therapeutic (THERAGRAN) tablet [MULTIVITAMIN THERAPEUTIC (THERAGRAN) TABLET] Take 1 tablet by mouth daily.        nitroGLYcerin (NITROSTAT) 0.4 MG sublingual tablet For chest pain place 1 tablet under the tongue every 5 minutes for 3 doses. If symptoms persist 5 minutes after 1st dose call 911. 30 tablet 1      nystatin (MYCOSTATIN) 100,000 unit/mL suspension         omeprazole (PRILOSEC) 40 MG DR capsule Take 1 capsule (40 mg) by mouth daily 90 capsule 3         INPATIENT Medications: Continuous Infusions:    aminocaproic acid (AMICAR) infusion ADULT       - MEDICATION INSTRUCTIONS -       - MEDICATION INSTRUCTIONS -       dexmedetomidine       dexmedetomidine       EPINEPHrine       EPINEPHrine       insulin regular       insulin regular       lactated ringers       lactated ringers       niCARdipine       niCARdipine       norepinephrine       Percutaneous Coronary Intervention orders placed (this is information for BPA alerting)       phenylephrine       phenylephrine       Reason antiplatelet medication not selected       sodium chloride       INPATIENT Medications: Scheduled Medications:    acetaminophen  975 mg Oral Q8H     aminocaproic acid  5 g Intravenous Once     aspirin  162 mg Oral Pre-Op/Pre-procedure x 1 dose    Or     aspirin  81 mg Oral Pre-Op/Pre-procedure x 1 dose     aspirin  162 mg Oral or NG Tube Daily     aspirin  81 mg Oral Once     aspirin  81 mg Oral Daily     [START ON 3/24/2023] aspirin  81 mg Oral Daily     atorvastatin  80 mg Oral Daily     cardioplegia low K - standard   PERFUSION On Call to OR     Cardioplegic Soln Hi K - Standard   PERFUSION On Call to OR     cardioplegic soln Hot Shot - Standard   PERFUSION On Call to OR     ceFAZolin  2 g Intravenous Pre-Op/Pre-procedure x 1 dose     ceFAZolin  2 g Intravenous See Admin Instructions     ceFAZolin  2 g Intravenous Q8H     chlorhexidine  10 mL Swish & Spit Once      desmopressin (DDAVP) infusion  0.3 mcg/kg Intravenous Once     [START ON 3/24/2023] heparin ANTICOAGULANT  5,000 Units Subcutaneous Q8H     heparin cell saver   PERFUSION On Call to OR     [Held by provider] hydroxychloroquine  400 mg Oral Daily     lidocaine   Transdermal Q8H REYNALDO     magnesium sulfate  4 g Intravenous Once     methadone  0.3 mg/kg (Ideal) Intravenous Once     metoprolol tartrate  12.5 mg Oral Pre-Op/Pre-procedure x 1 dose     mupirocin  0.5 g Both Nostrils BID     pantoprazole  40 mg Oral or NG Tube Daily    Or     pantoprazole  40 mg Oral Daily     Plasma-Lyte A  1,000 mL Intravenous Q15 Min     [START ON 3/24/2023] polyethylene glycol  17 g Oral Daily     prime solution for OR   Intravenous On Call to OR     senna-docusate  1 tablet Oral BID     sodium chloride (PF)  3 mL Intracatheter Q8H     sodium chloride (PF)  3 mL Intracatheter Q8H     [Held by provider] sulfaSALAzine ER  1,500 mg Oral BID     vein mixture   PERFUSION On Call to OR         Family History:     Social History:   Reviewed:  Family History   Problem Relation Age of Onset     Breast Cancer Mother 74.00     Cerebrovascular Disease Mother      Lung Cancer Father 70.00     Breast Cancer Maternal Aunt 70.00     Liver Cancer Brother 48.00    Reviewed:  Social History     Socioeconomic History     Marital status:      Spouse name: Not on file     Number of children: Not on file     Years of education: Not on file     Highest education level: Not on file   Occupational History     Not on file   Tobacco Use     Smoking status: Former     Types: Cigarettes     Quit date: 1970     Years since quittin.9     Smokeless tobacco: Never   Vaping Use     Vaping Use: Never used   Substance and Sexual Activity     Alcohol use: No     Drug use: No     Sexual activity: Not on file   Other Topics Concern     Not on file   Social History Narrative    Lives at home with her  , gardening , bikes , swims 4-5 miles walk a day     "    Social Determinants of Health     Financial Resource Strain: Not on file   Food Insecurity: Not on file   Transportation Needs: Not on file   Physical Activity: Not on file   Stress: Not on file   Social Connections: Not on file   Intimate Partner Violence: Not on file   Housing Stability: Not on file              Objective:   Vitals:  /75 (BP Location: Left arm)   Pulse 78   Temp 98  F (36.7  C) (Oral)   Resp 18   Ht 1.727 m (5' 8\")   Wt 80.7 kg (178 lb)   SpO2 95%   BMI 27.06 kg/m    Vent: Vent Mode: CMV/AC  (Continuous Mandatory Ventilation/ Assist Control)  Resp Rate (Set): 20 breaths/min  Tidal Volume (Set, mL): 460 mL  PEEP (cm H2O): 5 cmH2O  Resp: 18    GEN: Intubated, sedated  HEENT: Normocephalic, atraumatic.  Pupils small/reactive,, anicteric sclera. Moist mucous membranes.  Endotracheal tube.  NECK: Supple.  Right IJ PAC.  CHEST: Midsternal incision without drainage bleeding. Chest tubes to suction.  PULM: On mechanical ventilation.  Non-labored breathing.  No use of accessory muscles.  Clear to ausculation bilaterally.   CVS: Regular rate and rhythm.  Normal S1, S2.  No rubs, murmurs, or gallops.    ABDOMEN: Hypoactive bowel sounds.  Non-distended.    EXTREMITES:  No clubbing, cyanosis, or edema.  Extensive R hip/lateral thigh healed skin grafting. R wrist hematoma/ecchymoses, palpable ulnar and radial pulses.  NEURO:  Intubated, sedated    Intake/Output: I/O last 3 completed shifts:  In: 750 [I.V.:550; IV Piggyback:200]  Out: 375 [Urine:375]        Pertinent Studies:   All laboratory data reviewed  Serum Glucose range:   Recent Labs   Lab 03/23/23  1526 03/23/23  1503 03/23/23  1444 03/23/23  1400   * 156* 154* 131*     ABG:   Recent Labs   Lab 03/23/23  1526 03/23/23  1503 03/23/23  1306   PH 7.40 7.42 7.43   PCO2 38 38 36   PO2 115* 410* 425*   HCO3 24 25 24     CBC:   Recent Labs   Lab 03/23/23  1528 03/23/23  1526 03/23/23  1503 03/23/23  1400 03/23/23  1306 03/22/23  0745 "   WBC 15.6*  --   --   --  8.0 3.5*   HGB 8.9* 9.2* 8.5*   < > 10.6*  11.0* 13.1   HCT 27.2*  --   --   --  32.2* 40.8   MCV 98  --   --   --  97 99   *  --   --   --  182 233    < > = values in this interval not displayed.     Chemistry:   Recent Labs   Lab 03/23/23  1526 03/23/23  1503 03/23/23  1444 03/23/23  1306 03/22/23  0745    141 139   < > 143   POTASSIUM 4.0 5.0 4.5   < > 4.5   CHLORIDE  --   --   --   --  109*   CO2  --   --   --   --  26   BUN  --   --   --   --  21   CR  --   --   --   --  0.84   GFRESTIMATED  --   --   --   --  74   LULU  --   --   --   --  9.7    < > = values in this interval not displayed.     Coags:  Recent Labs   Lab 03/23/23  1528 03/23/23  1306   INR 1.64* 1.35*   PTT 99* >300*     Cardiac Markers:  No results for input(s): CKTOTAL, TROPONINI in the last 168 hours.     Microbiology:  None        Cardiology/Radiology:   Cardiac: All cardiac studies reviewed by me.    EKG:  Reviewed    TTE Limited, 3/23/23:  Summary:  The left ventricle is normal in size. There is normal left ventricular wall thickness. It appears severe hypokinetic in inferolateral wall. Normal right ventricle size and systolic function. There is no pericardial effusion.  Limited study, no pericardial effusion.    TTE Complete, 3/13/23:  Left ventricular size, wall motion and function are normal. The ejection fraction is 60-65%. Normal right ventricle size and systolic function. No hemodynamically significant valvular abnormalities on 2D or color flow Imaging.    Radiology:  All imaging studies reviewed by me.    Chest X-Ray, 3/23/23:  Pending

## 2023-03-23 NOTE — Clinical Note
Stent deployed in the proximal right coronary artery. Max pressure = 16 ana. Total duration = 30 seconds.

## 2023-03-23 NOTE — ANESTHESIA CARE TRANSFER NOTE
Patient: Antonia Everett    Procedure: Procedure(s):  CORONARY ARTERY BYPASS GRAFT X1 (CABG), EPIAORTIC ULTRASOUND, ENDOVEIN HARVEST FROM LEFT LOWER EXTREMITY       Diagnosis: CAD (coronary artery disease) [I25.10]  Diagnosis Additional Information: No value filed.    Anesthesia Type:   General     Note:    Oropharynx: endotracheal tube in place and ventilatory support  Level of Consciousness: iatrogenic sedation    Level of Supplemental Oxygen (L/min / FiO2): 15  Independent Airway: airway patency not satisfactory and stable  Dentition: dentition unchanged  Vital Signs Stable: post-procedure vital signs reviewed and stable  Report to RN Given: handoff report given  Patient transferred to: ICU    ICU Handoff: Call for PAUSE to initiate/utilize ICU HANDOFF, Identified Patient, Identified Responsible Provider, Reviewed the Pertinent Medical History, Discussed Surgical Course, Reviewed Intra-OP Anesthesia Management and Issues during Anesthesia, Set Expectations for Post Procedure Period and Allowed Opportunity for Questions and Acknowledgement of Understanding      Vitals:  Vitals Value Taken Time   /58 03/23/23 1648   Temp     Pulse 78 03/23/23 1650   Resp     SpO2 100 % 03/23/23 1650   Vitals shown include unvalidated device data.    Electronically Signed By: MICHELLE Nagy CRNA  March 23, 2023  4:51 PM

## 2023-03-24 ENCOUNTER — APPOINTMENT (OUTPATIENT)
Dept: RADIOLOGY | Facility: HOSPITAL | Age: 73
DRG: 907 | End: 2023-03-24
Attending: PHYSICIAN ASSISTANT
Payer: MEDICARE

## 2023-03-24 LAB
ALBUMIN SERPL BCG-MCNC: 4 G/DL (ref 3.5–5.2)
ALBUMIN UR-MCNC: 30 MG/DL
ALP SERPL-CCNC: 22 U/L (ref 35–104)
ALT SERPL W P-5'-P-CCNC: 27 U/L (ref 10–35)
ANION GAP SERPL CALCULATED.3IONS-SCNC: 12 MMOL/L (ref 7–15)
ANION GAP SERPL CALCULATED.3IONS-SCNC: 9 MMOL/L (ref 7–15)
APPEARANCE UR: CLEAR
AST SERPL W P-5'-P-CCNC: 92 U/L (ref 10–35)
ATRIAL RATE - MUSE: 69 BPM
ATRIAL RATE - MUSE: 72 BPM
ATRIAL RATE - MUSE: 80 BPM
BACTERIA #/AREA URNS HPF: ABNORMAL /HPF
BASE EXCESS BLDA CALC-SCNC: -0.1 MMOL/L
BASE EXCESS BLDV CALC-SCNC: 0.6 MMOL/L
BILIRUB SERPL-MCNC: 0.6 MG/DL
BILIRUB UR QL STRIP: NEGATIVE
BUN SERPL-MCNC: 13.3 MG/DL (ref 8–23)
BUN SERPL-MCNC: 13.6 MG/DL (ref 8–23)
CALCIUM SERPL-MCNC: 8.6 MG/DL (ref 8.8–10.2)
CALCIUM SERPL-MCNC: 8.8 MG/DL (ref 8.8–10.2)
CALCIUM, IONIZED MEASURED: 1.11 MMOL/L (ref 1.11–1.3)
CALCIUM, IONIZED MEASURED: 1.14 MMOL/L (ref 1.11–1.3)
CHLORIDE SERPL-SCNC: 110 MMOL/L (ref 98–107)
CHLORIDE SERPL-SCNC: 111 MMOL/L (ref 98–107)
COHGB MFR BLD: 98.7 % (ref 95–96)
COLOR UR AUTO: ABNORMAL
CREAT SERPL-MCNC: 0.8 MG/DL (ref 0.51–0.95)
CREAT SERPL-MCNC: 0.8 MG/DL (ref 0.51–0.95)
DEPRECATED HCO3 PLAS-SCNC: 23 MMOL/L (ref 22–29)
DEPRECATED HCO3 PLAS-SCNC: 26 MMOL/L (ref 22–29)
DIASTOLIC BLOOD PRESSURE - MUSE: NORMAL MMHG
ERYTHROCYTE [DISTWIDTH] IN BLOOD BY AUTOMATED COUNT: 12.7 % (ref 10–15)
GFR SERPL CREATININE-BSD FRML MDRD: 78 ML/MIN/1.73M2
GFR SERPL CREATININE-BSD FRML MDRD: 78 ML/MIN/1.73M2
GLUCOSE BLDC GLUCOMTR-MCNC: 119 MG/DL (ref 70–99)
GLUCOSE BLDC GLUCOMTR-MCNC: 124 MG/DL (ref 70–99)
GLUCOSE BLDC GLUCOMTR-MCNC: 125 MG/DL (ref 70–99)
GLUCOSE BLDC GLUCOMTR-MCNC: 126 MG/DL (ref 70–99)
GLUCOSE BLDC GLUCOMTR-MCNC: 126 MG/DL (ref 70–99)
GLUCOSE BLDC GLUCOMTR-MCNC: 129 MG/DL (ref 70–99)
GLUCOSE BLDC GLUCOMTR-MCNC: 132 MG/DL (ref 70–99)
GLUCOSE BLDC GLUCOMTR-MCNC: 135 MG/DL (ref 70–99)
GLUCOSE BLDC GLUCOMTR-MCNC: 147 MG/DL (ref 70–99)
GLUCOSE BLDC GLUCOMTR-MCNC: 150 MG/DL (ref 70–99)
GLUCOSE BLDC GLUCOMTR-MCNC: 151 MG/DL (ref 70–99)
GLUCOSE SERPL-MCNC: 125 MG/DL (ref 70–99)
GLUCOSE SERPL-MCNC: 150 MG/DL (ref 70–99)
GLUCOSE UR STRIP-MCNC: NEGATIVE MG/DL
HCO3 BLD-SCNC: 24 MMOL/L (ref 23–29)
HCO3 BLDV-SCNC: 25 MMOL/L (ref 24–30)
HCT VFR BLD AUTO: 27.4 % (ref 35–47)
HGB BLD-MCNC: 9.2 G/DL (ref 11.7–15.7)
HGB UR QL STRIP: NEGATIVE
HYALINE CASTS: 6 /LPF
INTERPRETATION ECG - MUSE: NORMAL
ION CA PH 7.4: 1.14 MMOL/L (ref 1.11–1.3)
ION CA PH 7.4: 1.18 MMOL/L (ref 1.11–1.3)
KETONES UR STRIP-MCNC: 80 MG/DL
LEUKOCYTE ESTERASE UR QL STRIP: NEGATIVE
MAGNESIUM SERPL-MCNC: 2.6 MG/DL (ref 1.7–2.3)
MCH RBC QN AUTO: 32.4 PG (ref 26.5–33)
MCHC RBC AUTO-ENTMCNC: 33.6 G/DL (ref 31.5–36.5)
MCV RBC AUTO: 97 FL (ref 78–100)
MRSA DNA SPEC QL NAA+PROBE: NEGATIVE
MUCOUS THREADS #/AREA URNS LPF: PRESENT /LPF
NITRATE UR QL: NEGATIVE
OXYHGB MFR BLD: 97.5 % (ref 95–96)
OXYHGB MFR BLDV: 58 % (ref 70–75)
P AXIS - MUSE: 23 DEGREES
P AXIS - MUSE: 28 DEGREES
P AXIS - MUSE: 34 DEGREES
PCO2 BLD: 34 MM HG (ref 35–45)
PCO2 BLDV: 38 MM HG (ref 35–50)
PH BLD: 7.46 [PH] (ref 7.37–7.44)
PH BLDV: 7.43 [PH] (ref 7.35–7.45)
PH UR STRIP: 5.5 [PH] (ref 5–7)
PH: 7.45 (ref 7.35–7.45)
PH: 7.46 (ref 7.35–7.45)
PHOSPHATE SERPL-MCNC: 3.8 MG/DL (ref 2.5–4.5)
PLATELET # BLD AUTO: 133 10E3/UL (ref 150–450)
PO2 BLD: 102 MM HG (ref 75–85)
PO2 BLDV: 31 MM HG (ref 25–47)
POTASSIUM SERPL-SCNC: 3.7 MMOL/L (ref 3.4–5.3)
POTASSIUM SERPL-SCNC: 3.8 MMOL/L (ref 3.4–5.3)
PR INTERVAL - MUSE: 152 MS
PR INTERVAL - MUSE: 158 MS
PR INTERVAL - MUSE: 166 MS
PROT SERPL-MCNC: 5.5 G/DL (ref 6.4–8.3)
QRS DURATION - MUSE: 104 MS
QRS DURATION - MUSE: 86 MS
QRS DURATION - MUSE: 86 MS
QT - MUSE: 438 MS
QT - MUSE: 454 MS
QT - MUSE: 458 MS
QTC - MUSE: 490 MS
QTC - MUSE: 497 MS
QTC - MUSE: 505 MS
R AXIS - MUSE: 21 DEGREES
R AXIS - MUSE: 31 DEGREES
R AXIS - MUSE: 33 DEGREES
RBC # BLD AUTO: 2.84 10E6/UL (ref 3.8–5.2)
RBC URINE: 9 /HPF
SA TARGET DNA: NEGATIVE
SAO2 % BLDV: 59.1 % (ref 70–75)
SODIUM SERPL-SCNC: 145 MMOL/L (ref 136–145)
SODIUM SERPL-SCNC: 146 MMOL/L (ref 136–145)
SP GR UR STRIP: >1.03 (ref 1–1.03)
SYSTOLIC BLOOD PRESSURE - MUSE: NORMAL MMHG
T AXIS - MUSE: -13 DEGREES
T AXIS - MUSE: 14 DEGREES
T AXIS - MUSE: 33 DEGREES
TEMPERATURE: 37 DEGREES C
TROPONIN T SERPL HS-MCNC: 1239 NG/L
TROPONIN T SERPL HS-MCNC: 1264 NG/L
UROBILINOGEN UR STRIP-MCNC: <2 MG/DL
VENTRICULAR RATE- MUSE: 69 BPM
VENTRICULAR RATE- MUSE: 72 BPM
VENTRICULAR RATE- MUSE: 80 BPM
WBC # BLD AUTO: 10 10E3/UL (ref 4–11)
WBC URINE: 3 /HPF

## 2023-03-24 PROCEDURE — 258N000003 HC RX IP 258 OP 636: Performed by: PHYSICIAN ASSISTANT

## 2023-03-24 PROCEDURE — 999N000009 HC STATISTIC AIRWAY CARE

## 2023-03-24 PROCEDURE — 93005 ELECTROCARDIOGRAM TRACING: CPT | Performed by: PHYSICIAN ASSISTANT

## 2023-03-24 PROCEDURE — 83735 ASSAY OF MAGNESIUM: CPT | Performed by: PHYSICIAN ASSISTANT

## 2023-03-24 PROCEDURE — 84484 ASSAY OF TROPONIN QUANT: CPT | Performed by: PHYSICIAN ASSISTANT

## 2023-03-24 PROCEDURE — 93010 ELECTROCARDIOGRAM REPORT: CPT | Mod: HIP | Performed by: INTERNAL MEDICINE

## 2023-03-24 PROCEDURE — 85027 COMPLETE CBC AUTOMATED: CPT | Performed by: PHYSICIAN ASSISTANT

## 2023-03-24 PROCEDURE — 999N000253 HC STATISTIC WEANING TRIALS

## 2023-03-24 PROCEDURE — 99207 PR NO BILLABLE SERVICE THIS VISIT: CPT | Performed by: INTERNAL MEDICINE

## 2023-03-24 PROCEDURE — 99291 CRITICAL CARE FIRST HOUR: CPT | Mod: 24 | Performed by: INTERNAL MEDICINE

## 2023-03-24 PROCEDURE — 999N000287 HC ICU ADULT ROUNDING, EACH 10 MINS

## 2023-03-24 PROCEDURE — 82805 BLOOD GASES W/O2 SATURATION: CPT | Performed by: THORACIC SURGERY (CARDIOTHORACIC VASCULAR SURGERY)

## 2023-03-24 PROCEDURE — 82805 BLOOD GASES W/O2 SATURATION: CPT | Performed by: PHYSICIAN ASSISTANT

## 2023-03-24 PROCEDURE — 94799 UNLISTED PULMONARY SVC/PX: CPT

## 2023-03-24 PROCEDURE — 999N000055 HC STATISTIC END TITIAL CO2 MONITORING

## 2023-03-24 PROCEDURE — 94003 VENT MGMT INPAT SUBQ DAY: CPT

## 2023-03-24 PROCEDURE — G0463 HOSPITAL OUTPT CLINIC VISIT: HCPCS

## 2023-03-24 PROCEDURE — 93005 ELECTROCARDIOGRAM TRACING: CPT

## 2023-03-24 PROCEDURE — 200N000001 HC R&B ICU

## 2023-03-24 PROCEDURE — 272N000202 HC AEROBIKA WITH MANOMETER

## 2023-03-24 PROCEDURE — 250N000013 HC RX MED GY IP 250 OP 250 PS 637: Performed by: PHYSICIAN ASSISTANT

## 2023-03-24 PROCEDURE — 250N000011 HC RX IP 250 OP 636: Performed by: PHYSICIAN ASSISTANT

## 2023-03-24 PROCEDURE — 82330 ASSAY OF CALCIUM: CPT | Performed by: PHYSICIAN ASSISTANT

## 2023-03-24 PROCEDURE — 999N000259 HC STATISTIC EXTUBATION

## 2023-03-24 PROCEDURE — 81001 URINALYSIS AUTO W/SCOPE: CPT | Performed by: INTERNAL MEDICINE

## 2023-03-24 PROCEDURE — 71045 X-RAY EXAM CHEST 1 VIEW: CPT

## 2023-03-24 PROCEDURE — P9045 ALBUMIN (HUMAN), 5%, 250 ML: HCPCS | Performed by: THORACIC SURGERY (CARDIOTHORACIC VASCULAR SURGERY)

## 2023-03-24 PROCEDURE — 250N000011 HC RX IP 250 OP 636: Performed by: INTERNAL MEDICINE

## 2023-03-24 PROCEDURE — 999N000157 HC STATISTIC RCP TIME EA 10 MIN

## 2023-03-24 PROCEDURE — 99232 SBSQ HOSP IP/OBS MODERATE 35: CPT | Performed by: INTERNAL MEDICINE

## 2023-03-24 PROCEDURE — 250N000009 HC RX 250: Performed by: PHYSICIAN ASSISTANT

## 2023-03-24 PROCEDURE — 82330 ASSAY OF CALCIUM: CPT | Performed by: THORACIC SURGERY (CARDIOTHORACIC VASCULAR SURGERY)

## 2023-03-24 PROCEDURE — 250N000013 HC RX MED GY IP 250 OP 250 PS 637: Performed by: INTERNAL MEDICINE

## 2023-03-24 PROCEDURE — 250N000011 HC RX IP 250 OP 636: Performed by: THORACIC SURGERY (CARDIOTHORACIC VASCULAR SURGERY)

## 2023-03-24 PROCEDURE — 84100 ASSAY OF PHOSPHORUS: CPT | Performed by: PHYSICIAN ASSISTANT

## 2023-03-24 PROCEDURE — 80053 COMPREHEN METABOLIC PANEL: CPT | Performed by: PHYSICIAN ASSISTANT

## 2023-03-24 PROCEDURE — 87070 CULTURE OTHR SPECIMN AEROBIC: CPT | Performed by: INTERNAL MEDICINE

## 2023-03-24 RX ORDER — POTASSIUM CHLORIDE 29.8 MG/ML
20 INJECTION INTRAVENOUS ONCE
Status: COMPLETED | OUTPATIENT
Start: 2023-03-24 | End: 2023-03-24

## 2023-03-24 RX ORDER — NICOTINE POLACRILEX 4 MG
15-30 LOZENGE BUCCAL
Status: DISCONTINUED | OUTPATIENT
Start: 2023-03-24 | End: 2023-03-25

## 2023-03-24 RX ORDER — SULFASALAZINE 500 MG/1
1000 TABLET, DELAYED RELEASE ORAL 2 TIMES DAILY
Status: DISCONTINUED | OUTPATIENT
Start: 2023-03-24 | End: 2023-03-27 | Stop reason: HOSPADM

## 2023-03-24 RX ORDER — DEXTROSE MONOHYDRATE 25 G/50ML
25-50 INJECTION, SOLUTION INTRAVENOUS
Status: DISCONTINUED | OUTPATIENT
Start: 2023-03-24 | End: 2023-03-25

## 2023-03-24 RX ADMIN — OXYCODONE HYDROCHLORIDE 10 MG: 5 TABLET ORAL at 18:06

## 2023-03-24 RX ADMIN — HYDROMORPHONE HYDROCHLORIDE 0.2 MG: 0.2 INJECTION, SOLUTION INTRAMUSCULAR; INTRAVENOUS; SUBCUTANEOUS at 07:51

## 2023-03-24 RX ADMIN — LIDOCAINE 2 PATCH: 4 PATCH TOPICAL at 20:07

## 2023-03-24 RX ADMIN — SODIUM CHLORIDE, POTASSIUM CHLORIDE, SODIUM LACTATE AND CALCIUM CHLORIDE 500 ML: 600; 310; 30; 20 INJECTION, SOLUTION INTRAVENOUS at 07:37

## 2023-03-24 RX ADMIN — CEFAZOLIN SODIUM 2 G: 2 INJECTION, SOLUTION INTRAVENOUS at 13:52

## 2023-03-24 RX ADMIN — CEFAZOLIN SODIUM 2 G: 2 INJECTION, SOLUTION INTRAVENOUS at 04:30

## 2023-03-24 RX ADMIN — SODIUM CHLORIDE, POTASSIUM CHLORIDE, SODIUM LACTATE AND CALCIUM CHLORIDE 250 ML: 600; 310; 30; 20 INJECTION, SOLUTION INTRAVENOUS at 12:35

## 2023-03-24 RX ADMIN — Medication 0.6 MCG/KG/MIN: at 12:38

## 2023-03-24 RX ADMIN — ACETAMINOPHEN 975 MG: 325 TABLET ORAL at 17:13

## 2023-03-24 RX ADMIN — HYDROMORPHONE HYDROCHLORIDE 0.2 MG: 0.2 INJECTION, SOLUTION INTRAMUSCULAR; INTRAVENOUS; SUBCUTANEOUS at 03:43

## 2023-03-24 RX ADMIN — DEXMEDETOMIDINE HYDROCHLORIDE 0.5 MCG/KG/HR: 400 INJECTION INTRAVENOUS at 05:07

## 2023-03-24 RX ADMIN — HYDROMORPHONE HYDROCHLORIDE 0.2 MG: 0.2 INJECTION, SOLUTION INTRAMUSCULAR; INTRAVENOUS; SUBCUTANEOUS at 12:11

## 2023-03-24 RX ADMIN — ACETAMINOPHEN 650 MG: 325 TABLET ORAL at 13:59

## 2023-03-24 RX ADMIN — ONDANSETRON 4 MG: 2 INJECTION INTRAMUSCULAR; INTRAVENOUS at 14:29

## 2023-03-24 RX ADMIN — CALCIUM GLUCONATE 1 G: 20 INJECTION, SOLUTION INTRAVENOUS at 01:02

## 2023-03-24 RX ADMIN — POTASSIUM CHLORIDE 20 MEQ: 29.8 INJECTION, SOLUTION INTRAVENOUS at 05:08

## 2023-03-24 RX ADMIN — SODIUM CHLORIDE, POTASSIUM CHLORIDE, SODIUM LACTATE AND CALCIUM CHLORIDE 250 ML: 600; 310; 30; 20 INJECTION, SOLUTION INTRAVENOUS at 13:56

## 2023-03-24 RX ADMIN — PROCHLORPERAZINE EDISYLATE 5 MG: 5 INJECTION INTRAMUSCULAR; INTRAVENOUS at 19:39

## 2023-03-24 RX ADMIN — HYDROMORPHONE HYDROCHLORIDE 0.2 MG: 0.2 INJECTION, SOLUTION INTRAMUSCULAR; INTRAVENOUS; SUBCUTANEOUS at 09:21

## 2023-03-24 RX ADMIN — HYDROMORPHONE HYDROCHLORIDE 0.2 MG: 0.2 INJECTION, SOLUTION INTRAMUSCULAR; INTRAVENOUS; SUBCUTANEOUS at 05:32

## 2023-03-24 RX ADMIN — HYDROMORPHONE HYDROCHLORIDE 0.4 MG: 0.2 INJECTION, SOLUTION INTRAMUSCULAR; INTRAVENOUS; SUBCUTANEOUS at 20:06

## 2023-03-24 RX ADMIN — HEPARIN SODIUM 5000 UNITS: 5000 INJECTION, SOLUTION INTRAVENOUS; SUBCUTANEOUS at 20:06

## 2023-03-24 RX ADMIN — HEPARIN SODIUM 5000 UNITS: 5000 INJECTION, SOLUTION INTRAVENOUS; SUBCUTANEOUS at 13:52

## 2023-03-24 RX ADMIN — HYDROMORPHONE HYDROCHLORIDE 0.4 MG: 0.2 INJECTION, SOLUTION INTRAMUSCULAR; INTRAVENOUS; SUBCUTANEOUS at 16:15

## 2023-03-24 RX ADMIN — HYDROMORPHONE HYDROCHLORIDE 0.4 MG: 0.2 INJECTION, SOLUTION INTRAMUSCULAR; INTRAVENOUS; SUBCUTANEOUS at 14:32

## 2023-03-24 RX ADMIN — ALBUMIN HUMAN 12.5 G: 0.05 INJECTION, SOLUTION INTRAVENOUS at 14:28

## 2023-03-24 ASSESSMENT — ACTIVITIES OF DAILY LIVING (ADL)
DOING_ERRANDS_INDEPENDENTLY_DIFFICULTY: NO
CONCENTRATING,_REMEMBERING_OR_MAKING_DECISIONS_DIFFICULTY: NO
DIFFICULTY_EATING/SWALLOWING: NO
ADLS_ACUITY_SCORE: 37
ADLS_ACUITY_SCORE: 37
TOILETING_ISSUES: NO
ADLS_ACUITY_SCORE: 42
ADLS_ACUITY_SCORE: 37
ADLS_ACUITY_SCORE: 37
DEPENDENT_IADLS:: INDEPENDENT
ADLS_ACUITY_SCORE: 37
WEAR_GLASSES_OR_BLIND: YES
ADLS_ACUITY_SCORE: 37
VISION_MANAGEMENT: GLASSES
CHANGE_IN_FUNCTIONAL_STATUS_SINCE_ONSET_OF_CURRENT_ILLNESS/INJURY: NO
DRESSING/BATHING_DIFFICULTY: NO
ADLS_ACUITY_SCORE: 46
ADLS_ACUITY_SCORE: 50
ADLS_ACUITY_SCORE: 46
FALL_HISTORY_WITHIN_LAST_SIX_MONTHS: NO
WALKING_OR_CLIMBING_STAIRS_DIFFICULTY: NO
ADLS_ACUITY_SCORE: 37
ADLS_ACUITY_SCORE: 46

## 2023-03-24 NOTE — PROGRESS NOTES
"SPIRITUAL HEALTH SERVICES NOTE  [facility]; ICU    SPIRITUAL ASSESSMENT    Patient sedated    Family more optimistic/hopeful today    Would like to see a     FULL SPIRITUAL CARE NOTE:   Follow-up with Erika and Morgan. Erika was weaning off of sedation and was not alert during this visit. Morgan is relieved that she is out of surgery. He is encouraged by Dr. Zamorano's report and Erika's progress today. He is optimistic about her recovery and shares again that \"she is tough\". He welcomes a visit from our hospital [facility] today. Notified Fr. Kali Washington of the request.     Time Spent with Patient/Family: 10 minutes    PLAN OF CARE: Will remain available for further support as patient/family needs/desires.    Jaki Al M.Div.      Office: [phone] (for non-urgent requests)  Please Vocera or page through [facility] for time-sensitive requests    "

## 2023-03-24 NOTE — PROGRESS NOTES
PULMONARY / CRITICAL CARE PROGRESS NOTE    Date / Time of Admission:  3/23/2023  6:28 AM    Assessment:     Antonia Everett is a 72 year old female with history of rheumatoid arthritis, osteoporosis, GERD. Recently evaluated by cardiology for anginal chest pain. Coronary CT angiogram confirmed obstructive coronary disease. Patient was scheduled for coronary angiogram. Procedure was done on 3/23, noted severe tortuosity and calcification of RCA, mid RCA stent was placed, unfortunately, coronary perforation of mid RCA. Patient decompensated, patient was intubated, PEA arrest, brief CPR , 5 min approx, pericardial tamponade, pericardiocentesis was done. New RCA stent was placed with cessation of bleeding. IABP was placed. CV surgery consulted. Emergent CABG x 1 with RSVG to RPDA, repair of bleeding sites at right atrial- RCA junction.  ml. Admitted to ICU.     1. Acute respiratory failure s/p intubation 3/23  2. S/p emergent CABG x 1 with RSVG to RPDA, repair of bleeding sites at right atrial- RCA junction on 3/23  3. S/p IABP 3/23  4. S/p PEA cardiac arrest secondary to pericardial tamponade 3/23  Brief CPR, s/p drainage of pericardial effusion.   5. CAD with angina symptoms s/p coronary angiogram, procedure complicated with RCA perforation 3/23  6. Fever  Reactive. Normal WBC, CXR without infiltrates.   Get tracheal culture and U/A.   Continue to monitor, follow up CBC with diff.   7. Rheumatoid arthritis   8. GERD    Advance Directives:  Full code    Plan:   1. Titrate vent settings A/C 16/450/5/30%, keep SpO2 > 92%, plateau pressures < 30  2. Start ventilator weaning trials  3. Monitor mediastinal tube output.   4. Sedation to keep RASS 0 , currently on precedex drip   5. Heplock IV fluids   6. Pressors as needed to keep MAP > 65, ScvO2 > 65, CI > 2, currently on phenylephrine   7. Plan to remove IABP  8. Tracheal aspirate for cultures, U/A and urine culture  9. Follow up CBC with diff  10. Monitor kidney  function   11. Supplement electrolytes as needed  12. NPO  13. PPI IV  14. Glucose level monitoring   15. DVT prophylaxis heparin SQ    Please contact me if you have any questions.  Total critical care time, not including separately billable procedure time: 45 minutes  This patient had a high probability of imminent or life threatening deterioration due to acute respiratory failure which required my direct attention, intervention and personal management.     Vargas Costa  Pulmonary / Critical Care  03/24/2023  7:52 AM          ICU DAILY CHECKLIST                           Can patient transfer out of MICU? no    FAST HUG:    Feeding:  Feeding: no.  Patient is receiving NPO    Case: yes  Analgesia/Sedation:no    Thromboembolic prophylaxis: Yes; Mode:  Heparin and SCDs  HOB>30:  Yes  Stress Ulcer Protocol Active: Yes; Mode: PPI  Glycemic Control: Any glucose > 180 no; Mode of Insulin Therapy: Sliding Scale Insulin    INTUBATED:  Can patient have daily waking:  Yes  Can patient have spontaneous breathing trial:  Yes    Restraints? yes    PHYSICAL THERAPY AND MOBILITY:  Can patient have PT and mobility trial: no  Activity: bedrest    Subjective:   HPI:  Antonia Everett is a 72 year old female with history of rheumatoid arthritis, osteoporosis, GERD. Recently evaluated by cardiology for anginal chest pain. Coronary CT angiogram confirmed obstructive coronary disease. Patient was scheduled for coronary angiogram. Procedure was done on 3/23, noted severe tortuosity and calcification of RCA, mid RCA stent was placed, unfortunately, coronary perforation of mid RCA. Patient decompensated, patient was intubated, PEA arrest, brief CPR , 5 min approx, pericardial tamponade, pericardiocentesis was done. New RCA stent was placed with cessation of bleeding. IABP was placed. CV surgery consulted. Emergent CABG x 1 with RSVG to RPDA, repair of bleeding sites at right atrial- RCA junction.  ml. Admitted to ICU.      Events overnight  - Hemodynamically stable, on phenylephrine drip   - Low dose precedex  - Arousable and following commands    Allergies: Codeine, Gabapentin, and Pregabalin     MEDS:  Current Facility-Administered Medications   Medication     acetaminophen (TYLENOL) tablet 650 mg     acetaminophen (TYLENOL) tablet 975 mg     aspirin EC tablet 81 mg     atorvastatin (LIPITOR) tablet 80 mg     bisacodyl (DULCOLAX) suppository 10 mg     calcium gluconate 1 g in 50 mL sodium chloride intermittent infusion (premix)     calcium gluconate 2 g in  mL intermittent infusion     calcium gluconate 3 g in sodium chloride 0.9 % 100 mL intermittent infusion     ceFAZolin (ANCEF) 2 g in 100 mL D5W intermittent infusion     Continuing beta blocker from home medication list OR beta blocker order already placed during this visit     Continuing statin from home medication list OR statin order already placed during this visit     dexmedetomidine (PRECEDEX) 4 mcg/mL in NS infusion     glucose gel 15-30 g    Or     dextrose 50 % injection 25-50 mL    Or     glucagon injection 1 mg     glucose gel 15-30 g    Or     dextrose 50 % injection 25-50 mL    Or     glucagon injection 1 mg     EPINEPHrine (ADRENALIN) 5 mg in sodium chloride 0.9 % 250 mL infusion CENTRAL     fentaNYL (PF) (SUBLIMAZE) injection 25 mcg     heparin ANTICOAGULANT injection 5,000 Units     HOLD: Metformin and metformin containing medications on day of procedure and 48 hours after IV contrast given for patients with acute kidney injury or severe chronic kidney disease (stage IV or stage V; i.e., eGFR less than 30)     hydrALAZINE (APRESOLINE) injection 10 mg     hydrALAZINE (APRESOLINE) injection 10 mg     HYDROmorphone (DILAUDID) injection 0.2 mg    Or     HYDROmorphone (DILAUDID) injection 0.4 mg     [Held by provider] hydroxychloroquine (PLAQUENIL) tablet 400 mg     insulin aspart (NovoLOG) injection (RAPID ACTING)     insulin aspart (NovoLOG) injection  "(RAPID ACTING)     lactated ringers BOLUS 250 mL     lactated ringers infusion     lactated ringers infusion     Lidocaine (LIDOCARE) 4 % Patch 2-4 patch     lidocaine (LMX4) cream     lidocaine 1 % 0.1-1 mL     lidocaine patch in PLACE     magnesium hydroxide (MILK OF MAGNESIA) suspension 30 mL     metoprolol (LOPRESSOR) injection 5 mg     midazolam (VERSED) injection 0.5 mg     niCARdipine 40 mg in 200 mL NS (CARDENE) infusion     norepinephrine (LEVOPHED) 4 mg in  mL infusion PREMIX     ondansetron (ZOFRAN ODT) ODT tab 4 mg    Or     ondansetron (ZOFRAN) injection 4 mg     oxyCODONE (ROXICODONE) tablet 5 mg    Or     oxyCODONE (ROXICODONE) tablet 10 mg     pantoprazole (PROTONIX) 2 mg/mL suspension 40 mg    Or     pantoprazole (PROTONIX) EC tablet 40 mg     Percutaneous Coronary Intervention orders placed (this is information for BPA alerting)     phenylephrine (MARIN-SYNEPHRINE) 50 mg in NaCl 0.9 % 250 mL infusion     polyethylene glycol (MIRALAX) Packet 17 g     prochlorperazine (COMPAZINE) injection 5 mg    Or     prochlorperazine (COMPAZINE) tablet 5 mg     Reason antiplatelet not prescribed     senna-docusate (SENOKOT-S/PERICOLACE) 8.6-50 MG per tablet 1 tablet     sodium chloride (PF) 0.9% PF flush 3 mL     sodium chloride (PF) 0.9% PF flush 3 mL     sodium chloride (PF) 0.9% PF flush 3 mL     sodium chloride (PF) 0.9% PF flush 3 mL     [Held by provider] sulfaSALAzine ER (AZULFIDINE EN) EC tablet 1,500 mg       Objective:   VITALS:  /75 (BP Location: Left arm)   Pulse 68   Temp (!) 101.3  F (38.5  C) (Pulmonary Artery)   Resp 16   Ht 1.727 m (5' 8\")   Wt 82.6 kg (182 lb 3.2 oz)   SpO2 99%   BMI 27.70 kg/m    VENT:  Vent Mode: CMV/AC  (Continuous Mandatory Ventilation/ Assist Control)  FiO2 (%): (S) 30 %  Resp Rate (Set): 16 breaths/min  Tidal Volume (Set, mL): 450 mL  PEEP (cm H2O): 5 cmH2O  Resp: 16    EXAM:   Gen: sedated, arousable, alert, no distress  HEENT: pale conjunctiva, moist " mucosa, tongue laceration, vented  Neck: no thyromegaly, masses or JVD  Chest : clean surgical wound, mediastinal chest tubes  Lungs: discrete ronchi  CV: regular, no murmurs or gallops appreciated  Abdomen: soft, NT, BS wnl  Ext: trace edema, IABP  Neuro: sedated, arousable, non non focal       Data Review:  Recent Labs   Lab 03/24/23  0656 03/24/23  0605 03/24/23  0452 03/24/23  0423 03/24/23  0402 03/24/23  0253   * 132* 147* 125* 125* 129*      03/24/23 04:23   Sodium 145   Potassium 3.7   Chloride 110 (H)   Carbon Dioxide (CO2) 23   Urea Nitrogen 13.6   Creatinine 0.80   GFR Estimate 78   Calcium 8.8   Anion Gap 12   Magnesium 2.6 (H)   Phosphorus 3.8   Calcium, Ionized Measured 1.14   Calcium, Ionized pH 7.4 1.18   Glucose 125 (H)      03/24/23 04:23   WBC 10.0   Hemoglobin 9.2 (L)   Hematocrit 27.4 (L)   Platelet Count 133 (L)   RBC Count 2.84 (L)   MCV 97   MCH 32.4   MCHC 33.6   RDW 12.7      03/24/23 04:26   pH Arterial 7.46 (H)   pCO2 Arterial 34 (L)   PO2 Arterial 102 (H)   Bicarbonate Arterial 24   Base Excess Art -0.1   Oxyhemoglobin 97.5 (H)     XR CHEST PORT 1 VIEW  LOCATION: [facility]  DATE/TIME: 3/24/2023 6:15 AM  INDICATION: Post Op CVTS Surgery  COMPARISON: 03/23/2023                                                IMPRESSION: Endotracheal tube 3.2 cm above the danie. Right IJ [place]-Stuart catheter tip in the main pulmonary artery. Mediastinal drains. Cardiac pacing wires. Sternotomy wires and mediastinal clips. Spinal stimulator. Lungs are clear. No pleural effusion or pneumothorax. No edema. Stable cardiomediastinal silhouette.    By:  Vargas Costa MD, 03/24/2023  7:52 AM    Primary Care Physician:  Sarah Beth Carvalho

## 2023-03-24 NOTE — PHARMACY-ADMISSION MEDICATION HISTORY
Pharmacy Note - Admission Medication History    Pertinent Provider Information: Of note, sulfasalazine was ordered prior to pharmacy completing the med rec. The dose was adjusted on the PTA med list, so per scope of practice, the inpatient dose was also adjusted.     ______________________________________________________________________    Prior To Admission (PTA) med list completed and updated in EMR.       PTA Med List   Medication Sig Last Dose     aspirin (ASA) 81 MG chewable tablet Take 1 tablet (81 mg) by mouth daily Starting tomorrow.      aspirin 81 MG EC tablet Take 81 mg by mouth daily 3/23/2023     atorvastatin (LIPITOR) 40 MG tablet Take 1 tablet (40 mg) by mouth daily (Patient taking differently: Take 40 mg by mouth At Bedtime) 3/22/2023     atorvastatin (LIPITOR) 80 MG tablet Take 1 tablet (80 mg) by mouth daily      diclofenac sodium 3 % Gel [DICLOFENAC SODIUM 3 % GEL] 4 g knees three times aday  and 2 g on hands or lebows three times a day (Patient taking differently: Place onto the skin 3 times daily as needed) Unknown     estradiol (ESTRACE) 0.1 MG/GM vaginal cream Place 0.5 g vaginally daily (Patient taking differently: Place 0.5 g vaginally three times a week) Unknown     GOLIMUMAB (SIMPONI ARIA IV) [GOLIMUMAB (SIMPONI ARIA IV)] Infuse into a venous catheter every 2 (two) months.  Unknown     hydroxychloroquine (PLAQUENIL) 200 mg tablet [HYDROXYCHLOROQUINE (PLAQUENIL) 200 MG TABLET] Take 400 mg by mouth daily.  3/22/2023     isosorbide mononitrate (IMDUR) 30 MG 24 hr tablet Take 1 tablet (30 mg) by mouth daily 3/23/2023     Lactobacillus rhamnosus GG (CULTURELLE) 10-15 Billion cell capsule [LACTOBACILLUS RHAMNOSUS GG (CULTURELLE) 10-15 BILLION CELL CAPSULE] Take 1 capsule by mouth daily. Unknown     multivitamin therapeutic (THERAGRAN) tablet [MULTIVITAMIN THERAPEUTIC (THERAGRAN) TABLET] Take 1 tablet by mouth daily. Unknown     nitroGLYcerin (NITROSTAT) 0.4 MG sublingual tablet One tablet under  "the tongue every 5 minutes if needed for chest pain. May repeat every 5 minutes for a maximum of 3 doses in 15 minutes\"      nitroGLYcerin (NITROSTAT) 0.4 MG sublingual tablet For chest pain place 1 tablet under the tongue every 5 minutes for 3 doses. If symptoms persist 5 minutes after 1st dose call 911. Unknown     omeprazole (PRILOSEC) 40 MG DR capsule Take 1 capsule (40 mg) by mouth daily 3/23/2023     sulfaSALAzine (AZULFIDINE ENTAB) 500 MG EC tablet 1,000 mg 2 times daily 3/23/2023       Information source(s): Patient, Family member, Prescription bottles and [facility]Everywhere[facility]  Method of interview communication: in-person    Summary of Changes to PTA Med List  New: none  Discontinued: nystatin suspension  Changed: estradiol, sulfasalazine    Patient was asked about OTC/herbal products specifically.  PTA med list reflects this.    In the past week, patient estimated taking medication this percent of the time:  greater than 90%.    Medication Affordability:N/A       Allergies were reviewed, assessed, and updated with the patient.      Patient does not use any multi-dose medications prior to admission.    The information provided in this note is only as accurate as the sources available at the time of the update(s).    Thank you for the opportunity to participate in the care of this patient.    Angy Byrd RPH  3/24/2023 9:47 AM    "

## 2023-03-24 NOTE — PROGRESS NOTES
CVTS Daily Progress Note   POD#1 s/p emergent CABGx1  Attending: Jaun  LOS: 1    SUBJECTIVE/INTERVAL EVENTS:    Patient arrived to ICU from OR yesterday afternoon. She remains intubated on minimal settings and with IABP at 1:1. Hemodynamics stable with lessening IABP support.  Appropriately sedated; GARCIA and follows commands. Remains on nessa this AM; normotensive. NSR. CI 2.4. Patient progressing overall well. Remains in bed secondary to the above. Pain well controlled. - BM / flatus. NPO for now. UOP adequate. Chest tube output appropriate. Hgb 9.2. Patient cannot meaningfully participate in ROS due to intubation/sedation.      OBJECTIVE:  Temp:  [97  F (36.1  C)-101.3  F (38.5  C)] 101.3  F (38.5  C)  Pulse:  [64-85] 69  Resp:  [16-18] 16  MAP:  [51 mmHg-101 mmHg] 76 mmHg  Arterial Line BP: ()/(37-73) 126/48  FiO2 (%):  [30 %-100 %] 30 %  SpO2:  [95 %-100 %] 98 %  Vent Mode: CMV/AC  (Continuous Mandatory Ventilation/ Assist Control)  FiO2 (%): (S) 30 %  Resp Rate (Set): 16 breaths/min  Tidal Volume (Set, mL): 450 mL  PEEP (cm H2O): 5 cmH2O  Resp: 16    Vitals:    03/23/23 0645 03/24/23 0600   Weight: 80.7 kg (178 lb) 82.6 kg (182 lb 3.2 oz)       Current Medications:    Scheduled Meds:    acetaminophen  975 mg Oral Q8H     aspirin  81 mg Oral Daily     atorvastatin  80 mg Oral Daily     ceFAZolin  2 g Intravenous Q8H     heparin ANTICOAGULANT  5,000 Units Subcutaneous Q8H     [Held by provider] hydroxychloroquine  400 mg Oral Daily     insulin aspart  1-7 Units Subcutaneous TID AC     insulin aspart  1-5 Units Subcutaneous At Bedtime     lidocaine   Transdermal Q8H REYNALDO     pantoprazole  40 mg Oral or NG Tube Daily    Or     pantoprazole  40 mg Oral Daily     polyethylene glycol  17 g Oral Daily     senna-docusate  1 tablet Oral BID     sodium chloride (PF)  3 mL Intracatheter Q8H     sodium chloride (PF)  3 mL Intracatheter Q8H     [Held by provider] sulfaSALAzine ER  1,500 mg Oral BID     Continuous  Infusions:    - MEDICATION INSTRUCTIONS -       - MEDICATION INSTRUCTIONS -       dexmedetomidine 0.4 mcg/kg/hr (03/24/23 0801)     EPINEPHrine Stopped (03/23/23 1955)     lactated ringers       lactated ringers       niCARdipine       norepinephrine Stopped (03/23/23 1645)     Percutaneous Coronary Intervention orders placed (this is information for BPA alerting)       phenylephrine 0.7 mcg/kg/min (03/24/23 0737)     Reason antiplatelet medication not selected       PRN Meds:.acetaminophen, bisacodyl, calcium gluconate, calcium gluconate, calcium gluconate, - MEDICATION INSTRUCTIONS -, - MEDICATION INSTRUCTIONS -, glucose **OR** dextrose **OR** glucagon, glucose **OR** dextrose **OR** glucagon, EPINEPHrine, fentaNYL, HOLD MEDICATION, hydrALAZINE, hydrALAZINE, HYDROmorphone **OR** HYDROmorphone, lactated ringers, Lidocaine, lidocaine 4%, lidocaine (buffered or not buffered), magnesium hydroxide, metoprolol, midazolam, niCARdipine, ondansetron **OR** ondansetron, oxyCODONE **OR** oxyCODONE, Percutaneous Coronary Intervention orders placed (this is information for BPA alerting), phenylephrine, prochlorperazine **OR** prochlorperazine, Reason antiplatelet medication not selected, sodium chloride (PF), sodium chloride (PF)    Cardiographics:    Telemetry monitoring demonstrates NSR with rates in the 60s per my personal review.    Imaging:  Results for orders placed or performed during the hospital encounter of 03/23/23   XR Chest Port 1 View    Impression    IMPRESSION: Poststernotomy. Endotracheal tube tip located 2.5 cm above the danie. Right IJ [place]-Stuart catheter with the tip in the right pulmonary outflow tract. Questionable faintly seen mediastinal drain. No pneumothorax. Low lung volumes. Mild left   basilar atelectasis. No pleural effusion. Normal heart size. No overt vascular congestion or pulmonary edema. Thoracic spinal stimulator.   XR Chest Port 1 View    Impression    IMPRESSION: Endotracheal tube 3.2 cm  above the danie. Right IJ [place]-Stuart catheter tip in the main pulmonary artery. Mediastinal drains. Cardiac pacing wires. Sternotomy wires and mediastinal clips. Spinal stimulator.    Lungs are clear. No pleural effusion or pneumothorax. No edema. Stable cardiomediastinal silhouette.       Labs, personally reviewed.  Hemoglobin   Date Value Ref Range Status   03/24/2023 9.2 (L) 11.7 - 15.7 g/dL Final   03/23/2023 9.9 (L) 11.7 - 15.7 g/dL Final   03/23/2023 8.9 (L) 11.7 - 15.7 g/dL Final     Hemoglobin POCT   Date Value Ref Range Status   03/23/2023 9.2 (L) 11.7 - 15.7 g/dL Final   03/23/2023 8.5 (L) 11.7 - 15.7 g/dL Final   03/23/2023 8.6 (L) 11.7 - 15.7 g/dL Final     WBC Count   Date Value Ref Range Status   03/24/2023 10.0 4.0 - 11.0 10e3/uL Final   03/23/2023 17.6 (H) 4.0 - 11.0 10e3/uL Final   03/23/2023 15.6 (H) 4.0 - 11.0 10e3/uL Final     Platelet Count   Date Value Ref Range Status   03/24/2023 133 (L) 150 - 450 10e3/uL Final   03/23/2023 152 150 - 450 10e3/uL Final   03/23/2023 149 (L) 150 - 450 10e3/uL Final     Creatinine   Date Value Ref Range Status   03/24/2023 0.80 0.51 - 0.95 mg/dL Final   03/24/2023 0.80 0.51 - 0.95 mg/dL Final   03/23/2023 0.77 0.51 - 0.95 mg/dL Final     Potassium   Date Value Ref Range Status   03/24/2023 3.7 3.4 - 5.3 mmol/L Final   03/24/2023 3.8 3.4 - 5.3 mmol/L Final   03/23/2023 3.8 3.4 - 5.3 mmol/L Final   03/23/2023 3.8 3.4 - 5.3 mmol/L Final   03/22/2023 4.5 3.5 - 5.0 mmol/L Final   06/24/2021 4.6 3.5 - 5.0 mmol/L Final     Potassium POCT   Date Value Ref Range Status   03/23/2023 4.0 3.5 - 5.0 mmol/L Final   03/23/2023 5.0 3.5 - 5.0 mmol/L Final   03/23/2023 4.5 3.5 - 5.0 mmol/L Final     Magnesium   Date Value Ref Range Status   03/24/2023 2.6 (H) 1.7 - 2.3 mg/dL Final   03/23/2023 2.2 1.7 - 2.3 mg/dL Final          I/O:  I/O last 3 completed shifts:  In: 4475.3 [I.V.:3165.3; Other:386; IV Piggyback:424]  Out: 4371 [Urine:4100; Chest Tube:271]       Physical  Exam:    General: Patient seen in bed. Intubated/sedated. GARCIA and follows commands  HEENT: PAULA, no sclera icterus, moist mucosa, ET tube secure, no tracheal deviation  CV: RRR on monitor. 2+ peripheral pulses in all extremities. Mild edema. IABP in place.  Pulm: Non-labored effort on CMV. Chest tubes in place, serosanguinous output, no air leak. Incision C/D/I.  Abd: Soft, NT, ND  : Farfan with long urine  Ext: Mild pedal edema, SCDs in place, warm, distal pulses intact  Neuro: Sedated, unable to fully eval      ASSESSMENT/PLAN:    Antonia Everett is a 72 year old female with a history of CAD who is POD#1 s/p CABGx1.    Principal Problem:    Coronary artery disease involving native heart, unspecified vessel or lesion type, unspecified whether angina present  Active Problems:    Coronary artery disease involving native heart without angina pectoris, unspecified vessel or lesion type    Unstable angina pectoris (H)    Abnormal cardiovascular stress test    Positive cardiac stress test    Percutaneous transluminal coronary angioplasty status    Ruptured, coronary artery (H)        NEURO:   - Scheduled Tylenol/lidocaine patches and PRN Tylenol/oxycodone/dilaudid/fentanyl for pain    CV:   - Normotensive  - Weaning pressors as hemodynamics allow, currently on nessa  - Beta blocker pending weaning from pressors  - ASA 162mg  - Atorvastatin 80mg daily  - Chest tubes to remain today  - IABP in place; plan to remove this AM    PULM:   - Vent weaning as able; anticipate extubation today  - Maintaining oxygen saturations on minimal vent settings  - Encourage pulmonary toilet    FEN/GI:  - Continue electrolyte replacement protocol  - Diet: NPO while intubated, then cardiac ADAT  - Bowel regimen    RENAL:  - Adequate UOP/hr. Continue to monitor closely via farfan.   - Cr 0.80  - Farfan to remain in for close monitoring of I/O and during period of diuresis/relative immobility.   - Diuresis PRN and pending weaning from  pressors    HEME:  - Acute blood loss anemia post-op.   - Hgb stable, no bleeding concerns. Hep SQ, ASA    ID:  - Inga op ppx complete, afebrile. No concerns for infection  - Will wait 2 weeks to restart PTA Plaquenil and sulfasalazine (RA)    ENDO:   - Transition to SSI    PPx:   - DVT: SCDs, SQ heparin TID, ambulation   - GI: Protonix 40mg PO daily    DISPO:   - Continue critical care in ICU        Patient discussed with Dr. Zamorano.        _______  PAUL EugeneC  Cardiothoracic Surgery  [phone]

## 2023-03-24 NOTE — CONSULTS
Care Management Initial Consult    General Information  Assessment completed with: Spouse or significant other,    Type of CM/SW Visit: Initial Assessment    Primary Care Provider verified and updated as needed:     Readmission within the last 30 days:        Reason for Consult: discharge planning  Advance Care Planning:            Communication Assessment  Patient's communication style: spoken language (English or Bilingual)    Hearing Difficulty or Deaf: yes   Wear Glasses or Blind: yes    Cognitive  Cognitive/Neuro/Behavioral: .WDL except  Level of Consciousness: sedated  Arousal Level: arouses to voice  Orientation: other (see comments) (ROBBY)  Mood/Behavior: calm, cooperative  Best Language:  (ROBBY - intubated)  Speech: endotracheal tube    Living Environment:   People in home: spouse     Current living Arrangements: house      Able to return to prior arrangements: yes       Family/Social Support:  Care provided by: self  Provides care for: no one  Marital Status:             Description of Support System: Supportive, Involved    Support Assessment: Adequate family and caregiver support, Adequate social supports    Current Resources:   Patient receiving home care services: No     Community Resources: None  Equipment currently used at home: none  Supplies currently used at home: None    Employment/Financial:  Employment Status:          Financial Concerns: No concerns identified           Lifestyle & Psychosocial Needs:  Social Determinants of Health     Tobacco Use: Medium Risk     Smoking Tobacco Use: Former     Smokeless Tobacco Use: Never     Passive Exposure: Not on file   Alcohol Use: Not on file   Financial Resource Strain: Not on file   Food Insecurity: Not on file   Transportation Needs: Not on file   Physical Activity: Not on file   Stress: Not on file   Social Connections: Not on file   Intimate Partner Violence: Not on file   Depression: Not at risk     PHQ-2 Score: 0   Housing Stability:  Not on file       Functional Status:  Prior to admission patient needed assistance:   Dependent ADLs:: Independent, Ambulation-no assistive device  Dependent IADLs:: Independent       Mental Health Status:          Chemical Dependency Status:                Values/Beliefs:  Spiritual, Cultural Beliefs, Sikhism Practices, Values that affect care:                 Additional Information:  RNCM met with patient and patient's  in room. Patient intubated. Initial assessment completed with Patient's , Morgan [phone]. Role explained.     Patient lives at home with spouse, independent. Drives. No DME/ Services/  benefits. No concerns.    Discussed patient in ICU rounds, goal to extubate later today after wean trial. Patient awake on vent following commands per RN.     Discharge needs unknown.     CM will continue to follow care progression and aide in discharge planning as needed.     Etta Michelle RN

## 2023-03-24 NOTE — PROGRESS NOTES
[facility] - ICU    RN Progress Note:            Pertinent Assessments:      Please refer to flowsheet rows for full assessment     Pt has had elevated temperatures 101 max, scheduled tylenol given and temperatures improving.  Weaned off pressors, MAP >65, Extubated and IABP removed this am.          Mobility Level:     2 - Tilt      Barriers to Progression - tethered to monitor, invasive lines          Key Events - This Shift:     SANGEETHA SAT (Sedation Awakening Trial): For use ONLY if intubated    SAT Safety Screen PASSED   If FAILED why?    SAT Performed PASSED   If FAILED why?      Extubated at 1200            Barriers to Discharge / Downgrade:     POD #1, invasive lines          Point of Contact Update YES-OR-NO: Yes  If No, reason: N/A  Name:Morgan  Phone Number:Updated at bedside  Summary of Conversation: Pt doing very well following emergency open heart surgery. Cardiac rehab to start tomorrow.  Balloon pump removed and pt extubated.

## 2023-03-24 NOTE — PROGRESS NOTES
RT PROGRESS NOTE    VENT DAY# 2    Patient was weaned on pressure support of 5 and peep of 5, Fi02 30%.  They achieved volumes of 430mls, respiratory rate of 14 and a minute ventilation of 7.25. Weaned successfully  for 70 minutes.    Sputum sample completed prior to extubation.    Patient had an audible cuff leak heard by RN and RT. Patient extubated to 2L nasal canula and had a strong cough. RT to continue to assess and monitor cardiopulmonary status while in the ICU.       Lynette Alcala, RT

## 2023-03-24 NOTE — PROGRESS NOTES
RT PROGRESS NOTE    VENT DAY# 2    CURRENT SETTINGS:   Vent Mode: CMV/AC  (Continuous Mandatory Ventilation/ Assist Control)  FiO2 (%): 35 %  Resp Rate (Set): 16 breaths/min  Tidal Volume (Set, mL): 450 mL  PEEP (cm H2O): 5 cmH2O  Resp: 16      ETT SIZE 7.5 Secured at 22 cm at teeth/gums    Respiratory Medications: none       NOTE / SHIFT SUMMARY:   RT will continue to monitor.     Noah Ann, RT

## 2023-03-24 NOTE — PROGRESS NOTES
"[facility] consulted for \"management of noncardiac medical issues \".  Patient is intubated and on pressors and intensivist team is following.  [facility] will follow peripherally for now until patient extubated and ICU team signed off.    Pravin Aguirre MD  "

## 2023-03-24 NOTE — TREATMENT PLAN
Patient Score: 8  Patient Acuity: 4    Clinical Indication for Therapy: prevent atelectasis    Therapy Ordered: Aerobika and IS QID       History:   -Home inhaled Medications: none   -Home Oxygen: none      Assessment Summary: Patient is post CABG x1 with RSVG to RPDA. Extubated earlier today to 2L nasal canula. Breath sounds have remained clear bilaterally.   Patient was taught and demonstrated back the Aerobika and IS. She achieved 1000ml on the IS with proper technique. RT to continue to encourage the usage of aerobika and IS and to continue to assess and monitor cardiopulmonary status.       Lynette Alcala, RT  3/24/2023

## 2023-03-24 NOTE — PROGRESS NOTES
PROVIDER RESTRAINT FOR NON-VIOLENT BEHAVIOR FACE TO FACE EVALUATION    Patient's Immediate Situation:  Patient demonstrated the following behaviors: pulling lines    Patient's Reaction to the intervention:  Does patient understand the reason for restraint/seclusion? unable to express    Medical Condition:  Is there any evidence of compromise of Skin integrity, Respiratory, Cardiovascular, Musculoskeletal, Hydration?  yes    Behavioral Condition:  In consultation with the RN, is there a need to continue this restraint or seclusion? yes    See Restraint Flowsheet for complete restraint documentation and assessment.    Vargas Costa MD  Critical Care medicine   03/24/23

## 2023-03-24 NOTE — OP NOTE
Procedure Date: 03/23/2023    OPERATING AREA:  Room 8.    PROCEDURES:  1.  Median sternotomy.  2.  Evacuation of a bloody pericardial effusion.  3.  Endoscopic greater saphenous vein procurement from the left thigh.  4.  Epiaortic ultrasound of the ascending aorta.  5.  Placement on central cardiopulmonary bypass.  6.  Emergency single vessel coronary artery bypass grafting procedure; reversed saphenous vein graft to the right posterior descending coronary artery.  7.  Placement of a temporary ventricular pacing wire.  8.  Control of bleeding sites x 2 involving the proximal right coronary artery and the right atrium.    ATTENDING SURGEON:  Yessenia Zamorano MD    FIRST ASSISTANT:  ST Durand SA-C    ANESTHESIA:  General endotracheal.    SKIN PREP:  Betadine and DuraPrep.    INCISIONS:  Median sternotomy and a skip incision along the course of the greater saphenous vein, left upper leg.    DRAINS:  Two 32-[place] mediastinal.    CULTURES:  None.    SPECIMEN:  None.    CLOSURE:  Routine.    PREOPERATIVE DIAGNOSES:  1.  Unstable angina.  2.  Bloody pericardial effusion, status post percutaneous coronary intervention and stenting of the right coronary artery.  3.  Single vessel coronary artery disease.  4.  Rheumatoid arthritis.    POSTOPERATIVE DIAGNOSES:  1.  Unstable angina.  2.  Bloody pericardial effusion, status post percutaneous coronary intervention and stenting of the right coronary artery.  3.  Single vessel coronary artery disease.  4.  Rheumatoid arthritis.    BRIEF HISTORY:  Mrs. Everett is a 72-year-old woman who has been experiencing chest pain with exertion and more recently, it wakes her at night.  Today, she underwent a coronary angiogram and a stent was placed in the right coronary artery.  The right coronary artery was perforated and the patient developed a bloody pericardial effusion.  The bloody pericardial effusion was evacuated after the patient was intubated.  Arrangements were made  for the patient to undergo an emergency coronary artery bypass grafting procedure and the above procedures were planned.    FINDINGS AT OPERATION:  There was a bloody pericardial effusion present.  There was a hematoma involving the proximal, mid and distal right coronary artery.  There was slow bleeding from the junction of the right atrium and proximal right coronary artery.  The right posterior descending coronary artery was a 2 mm target vessel, an excellent vessel for bypass grafting.  The reversed saphenous vein graft measured 4 to 5 mm in diameter and was of good quality.  The patient's ascending aorta was free of any palpable plaque. There was a small area of plaque seen anteriorly by epiaortic ultrasound and this area was avoided.  The patient's pulmonary artery pressures were normal.  Overall, contractility of the heart was preserved.    DESCRIPTION OF PROCEDURE:  The patient arrived from the cath lab with an intraaortic balloon pump in place.  She was already intubated.  She was positioned supine and an arterial line was placed.  General anesthesia was induced. The patient's neck, chest, abdomen, both groins, and lower extremities were prepped and draped in a standard sterile fashion.  A complete median sternotomy was made.  The pericardium was opened and a bloody pericardial effusion was drained.  The pericardium was then tented up on pericardial sutures.  The aorta was  from the pulmonary artery.  The patient remained hemodynamically stable.  The greater saphenous vein was then procured from the left lower extremity.  A small incision was made along the course of the greater saphenous vein below the knee by Emiliana Dorantes.  Approximately 5 cm of the vein was dissected out circumferentially.  The endoscope was then passed proximally along the course of the greater saphenous vein.  The vein was mobilized circumferentially, its branches were clipped or cauterized.  It was clipped proximally and  distally and extracted.  It was then cannulated and distended, its branches controlled with Ligaclips.  The leg wound was rendered hemostatic and then closed in layers using running 2-0 and 3-0 Vicryl, as well as Dermabond on the skin.    Heparin was administered.  Epiaortic ultrasound of the ascending aorta was carried out.  Pursestring sutures were placed in the ascending aorta and right atrium for cannulation.  When the ACT was appropriate, cannulation was performed and central cardiopulmonary bypass was established.  A retrograde cardioplegia catheter was inserted via the right atrium into the coronary sinus.  The aorta was crossclamped and 1200 mL of cold blood cardioplegia was administered in a retrograde fashion.  A good arrest was achieved.  Cold topical saline and slush was applied to the heart intermittently during the period of aortic crossclamp.  Attention was first turned to the right posterior descending coronary artery.  This vessel was dissected out for a distance of 1 cm and then opened for a distance of 1 cm.  It was bypassed in an end-to-side fashion using reversed saphenous vein graft and running 7-0 Prolene.  Upon completion of this one and only distal anastomosis, the vein graft was flushed with cold blood cardioplegia and sized to the ascending aorta and the patient received another dose of cold blood cardioplegia in a retrograde fashion.  It had been decided to perform the single proximal aorto-saphenous anastomosis with the aortic crossclamp in place; therefore, a 4 mm punch aortotomy was created.  The single proximal aorto-saphenous anastomosis was then constructed in an end-to-side fashion using running 6-0 Prolene.  A hot shot was then delivered in a retrograde fashion and de-airing maneuvers were performed.  The aortic crossclamp was released.  The aortic crossclamp time was 31 minutes.  The patient required defibrillation twice.  A ventricular pacing wire was applied and the patient was  ventricularly paced for a brief period of time and then her own rhythm returned.  She received a loading dose of amiodarone.  Gentle ventilation resumed. By transesophageal echo after a while, it was clear that adequate de-airing had been done and the patient was placed on low-dose epinephrine.  The intraaortic balloon pump had been turned off prior to aortic cannulation and at this time, it was placed on 1:2 with half augmentation and later full augmentation.  When she was warm, the heart was allowed to fill and eject and the patient  from cardiopulmonary bypass without difficulty.  Total time on cardiopulmonary bypass was 50 minutes.  The patient was decannulated and the cannulation sites oversewn with 4-0 Prolene.  Protamine was administered to reverse the heparin.  The patient also received a half dose of Factor V. Even with DDAVP, it was apparent there was still a bleeding site in the area of the proximal right coronary artery, as well as its junction with the right atrium.  Therefore, 2 pledgeted 4-0 Prolenes were placed in this area, finally achieving hemostasis.  The mediastinum was drained with two 32-[place] chest tubes.  The sternum was approximated with three #6 stainless steel sternal wires as well as 3 double wires.  The sternotomy wound was irrigated with warm antibiotic-containing solution.  It was closed in 4 layers using 2 layers of running 0 Stratafix, an additional layer of 2-0 Stratafix, and a subcuticular stitch of 4-0 Monocryl.  Dermabond was applied to the skin.  The sponge, needle and instrument counts were reported as correct.  The estimated blood loss was 350 mL. Mrs. Everett was brought to the intensive care unit in critical but stable condition.    Yessenia Zamorano MD        D: 2023   T: 2023   MT: precious    Name:     ELSA EVERETT  MRN:      [id number]        Account:        [id number]   :      1950           Procedure Date: 2023      Document: [id number]

## 2023-03-24 NOTE — PROGRESS NOTES
CARDIOLOGY PROGRESS NOTE      Assessment/Plan:  1.  Coronary artery disease involving native heart, unspecified vessel or lesion type, unspecified whether angina present-angiography yesterday showed normal left main with 20% ostial stenosis, LAD with ostial 10% stenosis, first diagonal sequential 30 and 25% lesions and second diagonal 30% stenosis.  Circumflex had a proximal 40% lesion, right coronary artery had a proximal 50% lesion followed by a mid 99% lesion.  There was a distal 20% lesion.  Patient had Jefry 18 x 3.5 mm stent placed.  Apparent rupture requiring urgent pericardiocentesis and subsequent coronary artery bypass grafting.  2.  Coronary bypass grafting-she had a vein graft to the right coronary artery yesterday.  Doing well, currently on small amount of phenylephrine.  Postop care as per surgery.  3.  Pure hypercholesterolemia-restart home of atorvastatin 40 mg.  4.  Rheumatoid arthritis-will need home immunosuppressive started.    Plan:  1.  Postop care as per cardiothoracic surgery.  2.  Cardiology will sign off, available as needed.    Discharge Plannin.  Barrier to discharge is recovery from open heart procedure.  2.  Can follow with Dr. Germán Trevino primary cardiologist.     LOS: 1 day     Subjective:  Interval History:    72-year-old white female being seen on second day of hospitalization with son and daughter-in-law at bedside.  She feels a slight left-sided nondescript discomfort.  Does have incisional discomfort.  Does have a raspy voice.  Feels thirsty.  No significant shortness breath, PND, orthopnea or peripheral edema.    Medications    acetaminophen  975 mg Oral Q8H     aspirin  81 mg Oral Daily     atorvastatin  80 mg Oral Daily     ceFAZolin  2 g Intravenous Q8H     heparin ANTICOAGULANT  5,000 Units Subcutaneous Q8H     [Held by provider] hydroxychloroquine  400 mg Oral Daily     insulin aspart  1-7 Units Subcutaneous TID AC     insulin aspart  1-5 Units Subcutaneous At  "Bedtime     lidocaine   Transdermal Q8H REYNALDO     pantoprazole  40 mg Oral or NG Tube Daily    Or     pantoprazole  40 mg Oral Daily     polyethylene glycol  17 g Oral Daily     senna-docusate  1 tablet Oral BID     sodium chloride (PF)  3 mL Intracatheter Q8H     sodium chloride (PF)  3 mL Intracatheter Q8H     [Held by provider] sulfaSALAzine ER  1,000 mg Oral BID     Objective:   Vital signs in last 24 hours:  Temp:  [97  F (36.1  C)-101.3  F (38.5  C)] 101.3  F (38.5  C)  Pulse:  [64-85] 73  Resp:  [16-18] 16  BP: (105)/(50) 105/50  MAP:  [51 mmHg-101 mmHg] 69 mmHg  Arterial Line BP: ()/(37-73) 92/53  FiO2 (%):  [30 %-100 %] 30 %  SpO2:  [95 %-100 %] 96 %    Physical Exam:  /50   Pulse 73   Temp (!) 101.3  F (38.5  C) (Pulmonary Artery)   Resp 16   Ht 1.727 m (5' 8\")   Wt 82.6 kg (182 lb 3.2 oz)   SpO2 96%   BMI 27.70 kg/m      General Appearance:    Alert, cooperative, no distress, appears stated age   Head:    Normocephalic, without obvious abnormality, atraumatic   Throat:   Lips, mucosa, and tongue normal; teeth and gums normal   Neck:   Supple, symmetrical, trachea midline, no adenopathy;        thyroid:  No enlargement/tenderness/nodules; no carotid    bruit or JVD   Back:     Symmetric, no curvature, ROM normal, no CVA tenderness   Lungs:     Clear to auscultation bilaterally, respirations unlabored   Chest wall:    No tenderness, midline sternotomy scar   Heart:    Regular rate and rhythm, S1 and S2 normal, no murmur, rub   or gallop   Abdomen:     Soft, non-tender, bowel sounds active all four quadrants,     no masses, no organomegaly   Extremities:   Normal, atraumatic, no cyanosis or edema   Pulses:   2+ and symmetric all extremities   Skin:   Skin color, texture, turgor normal, no rashes or lesions     Cardiographics:      ECG: Personally reviewed by myself shows sinus rhythm, borderline prolonged QT otherwise within normal limits.    Echocardiogram:   The left ventricle is normal in " size. There is normal left ventricular wall thickness.  It appears severe hypokinetic in inferolateral wall.  Normal right ventricle size and systolic function.  There is no pericardial effusion.     Limited study, no pericardial effusion.  ______________________________________________________________________________        Lab Results:   Recent Labs   Lab 03/24/23 0423   WBC 10.0   HGB 9.2*   HCT 27.4*   *     Recent Labs   Lab 03/24/23 0423      CO2 23   BUN 13.6   .       No results found for: CKTOTAL, CKMB, TROPONINI

## 2023-03-24 NOTE — PROGRESS NOTES
Chart reviewed.  Patient currentl requiring ICU level of care. Extubated earlier to 2L.  ICU team/CV surgery team managing at this time.  HMS peripherally following until no longer requiring ICU level of care.

## 2023-03-25 ENCOUNTER — APPOINTMENT (OUTPATIENT)
Dept: OCCUPATIONAL THERAPY | Facility: HOSPITAL | Age: 73
DRG: 907 | End: 2023-03-25
Attending: PHYSICIAN ASSISTANT
Payer: MEDICARE

## 2023-03-25 LAB
ANION GAP SERPL CALCULATED.3IONS-SCNC: 9 MMOL/L (ref 7–15)
BUN SERPL-MCNC: 22.5 MG/DL (ref 8–23)
CALCIUM SERPL-MCNC: 8.3 MG/DL (ref 8.8–10.2)
CALCIUM, IONIZED MEASURED: 1.15 MMOL/L (ref 1.11–1.3)
CHLORIDE SERPL-SCNC: 110 MMOL/L (ref 98–107)
CREAT SERPL-MCNC: 0.82 MG/DL (ref 0.51–0.95)
DEPRECATED HCO3 PLAS-SCNC: 25 MMOL/L (ref 22–29)
GFR SERPL CREATININE-BSD FRML MDRD: 76 ML/MIN/1.73M2
GLUCOSE BLDC GLUCOMTR-MCNC: 115 MG/DL (ref 70–99)
GLUCOSE SERPL-MCNC: 122 MG/DL (ref 70–99)
ION CA PH 7.4: 1.15 MMOL/L (ref 1.11–1.3)
MAGNESIUM SERPL-MCNC: 2.3 MG/DL (ref 1.7–2.3)
PH: 7.39 (ref 7.35–7.45)
PHOSPHATE SERPL-MCNC: 2.8 MG/DL (ref 2.5–4.5)
POTASSIUM SERPL-SCNC: 3.9 MMOL/L (ref 3.4–5.3)
POTASSIUM SERPL-SCNC: 3.9 MMOL/L (ref 3.4–5.3)
SODIUM SERPL-SCNC: 144 MMOL/L (ref 136–145)

## 2023-03-25 PROCEDURE — 82330 ASSAY OF CALCIUM: CPT | Performed by: PHYSICIAN ASSISTANT

## 2023-03-25 PROCEDURE — 97535 SELF CARE MNGMENT TRAINING: CPT | Mod: GO

## 2023-03-25 PROCEDURE — 83735 ASSAY OF MAGNESIUM: CPT | Performed by: THORACIC SURGERY (CARDIOTHORACIC VASCULAR SURGERY)

## 2023-03-25 PROCEDURE — 999N000127 HC STATISTIC PERIPHERAL IV START W US GUIDANCE

## 2023-03-25 PROCEDURE — 97166 OT EVAL MOD COMPLEX 45 MIN: CPT | Mod: GO

## 2023-03-25 PROCEDURE — 84100 ASSAY OF PHOSPHORUS: CPT | Performed by: THORACIC SURGERY (CARDIOTHORACIC VASCULAR SURGERY)

## 2023-03-25 PROCEDURE — 97110 THERAPEUTIC EXERCISES: CPT | Mod: GO

## 2023-03-25 PROCEDURE — 210N000001 HC R&B IMCU HEART CARE

## 2023-03-25 PROCEDURE — 84132 ASSAY OF SERUM POTASSIUM: CPT | Performed by: THORACIC SURGERY (CARDIOTHORACIC VASCULAR SURGERY)

## 2023-03-25 PROCEDURE — 80048 BASIC METABOLIC PNL TOTAL CA: CPT | Performed by: PHYSICIAN ASSISTANT

## 2023-03-25 PROCEDURE — 99232 SBSQ HOSP IP/OBS MODERATE 35: CPT | Performed by: INTERNAL MEDICINE

## 2023-03-25 PROCEDURE — 36415 COLL VENOUS BLD VENIPUNCTURE: CPT | Performed by: PHYSICIAN ASSISTANT

## 2023-03-25 PROCEDURE — 258N000003 HC RX IP 258 OP 636: Performed by: PHYSICIAN ASSISTANT

## 2023-03-25 PROCEDURE — 250N000011 HC RX IP 250 OP 636: Performed by: PHYSICIAN ASSISTANT

## 2023-03-25 PROCEDURE — 250N000013 HC RX MED GY IP 250 OP 250 PS 637: Performed by: PHYSICIAN ASSISTANT

## 2023-03-25 PROCEDURE — 250N000013 HC RX MED GY IP 250 OP 250 PS 637: Performed by: INTERNAL MEDICINE

## 2023-03-25 RX ORDER — FUROSEMIDE 10 MG/ML
40 INJECTION INTRAMUSCULAR; INTRAVENOUS 2 TIMES DAILY
Status: DISCONTINUED | OUTPATIENT
Start: 2023-03-25 | End: 2023-03-27

## 2023-03-25 RX ORDER — KETOROLAC TROMETHAMINE 15 MG/ML
15 INJECTION, SOLUTION INTRAMUSCULAR; INTRAVENOUS EVERY 6 HOURS PRN
Status: DISCONTINUED | OUTPATIENT
Start: 2023-03-25 | End: 2023-03-27 | Stop reason: HOSPADM

## 2023-03-25 RX ADMIN — ONDANSETRON 4 MG: 4 TABLET, ORALLY DISINTEGRATING ORAL at 17:25

## 2023-03-25 RX ADMIN — HEPARIN SODIUM 5000 UNITS: 5000 INJECTION, SOLUTION INTRAVENOUS; SUBCUTANEOUS at 21:15

## 2023-03-25 RX ADMIN — HYDROMORPHONE HYDROCHLORIDE 0.2 MG: 0.2 INJECTION, SOLUTION INTRAMUSCULAR; INTRAVENOUS; SUBCUTANEOUS at 07:06

## 2023-03-25 RX ADMIN — ATORVASTATIN CALCIUM 80 MG: 40 TABLET, FILM COATED ORAL at 09:26

## 2023-03-25 RX ADMIN — HEPARIN SODIUM 5000 UNITS: 5000 INJECTION, SOLUTION INTRAVENOUS; SUBCUTANEOUS at 04:00

## 2023-03-25 RX ADMIN — FUROSEMIDE 40 MG: 10 INJECTION, SOLUTION INTRAVENOUS at 08:26

## 2023-03-25 RX ADMIN — FUROSEMIDE 40 MG: 10 INJECTION, SOLUTION INTRAVENOUS at 14:25

## 2023-03-25 RX ADMIN — PANTOPRAZOLE SODIUM 40 MG: 40 TABLET, DELAYED RELEASE ORAL at 08:27

## 2023-03-25 RX ADMIN — OXYCODONE HYDROCHLORIDE 10 MG: 5 TABLET ORAL at 03:11

## 2023-03-25 RX ADMIN — KETOROLAC TROMETHAMINE 15 MG: 15 INJECTION, SOLUTION INTRAMUSCULAR; INTRAVENOUS at 10:10

## 2023-03-25 RX ADMIN — METOPROLOL TARTRATE 12.5 MG: 25 TABLET, FILM COATED ORAL at 08:27

## 2023-03-25 RX ADMIN — SENNOSIDES AND DOCUSATE SODIUM 1 TABLET: 50; 8.6 TABLET ORAL at 09:26

## 2023-03-25 RX ADMIN — METOPROLOL TARTRATE 12.5 MG: 25 TABLET, FILM COATED ORAL at 21:17

## 2023-03-25 RX ADMIN — HEPARIN SODIUM 5000 UNITS: 5000 INJECTION, SOLUTION INTRAVENOUS; SUBCUTANEOUS at 12:46

## 2023-03-25 RX ADMIN — SENNOSIDES AND DOCUSATE SODIUM 1 TABLET: 50; 8.6 TABLET ORAL at 21:15

## 2023-03-25 RX ADMIN — KETOROLAC TROMETHAMINE 15 MG: 15 INJECTION, SOLUTION INTRAMUSCULAR; INTRAVENOUS at 17:58

## 2023-03-25 RX ADMIN — ACETAMINOPHEN 975 MG: 325 TABLET ORAL at 00:48

## 2023-03-25 RX ADMIN — ONDANSETRON 4 MG: 4 TABLET, ORALLY DISINTEGRATING ORAL at 11:37

## 2023-03-25 RX ADMIN — SODIUM CHLORIDE, POTASSIUM CHLORIDE, SODIUM LACTATE AND CALCIUM CHLORIDE 250 ML: 600; 310; 30; 20 INJECTION, SOLUTION INTRAVENOUS at 03:15

## 2023-03-25 RX ADMIN — ASPIRIN 81 MG: 81 TABLET, COATED ORAL at 09:26

## 2023-03-25 RX ADMIN — POLYETHYLENE GLYCOL 3350 17 G: 17 POWDER, FOR SOLUTION ORAL at 08:26

## 2023-03-25 RX ADMIN — ACETAMINOPHEN 975 MG: 325 TABLET ORAL at 08:27

## 2023-03-25 ASSESSMENT — ACTIVITIES OF DAILY LIVING (ADL)
ADLS_ACUITY_SCORE: 26
PREVIOUS_RESPONSIBILITIES: MEAL PREP;HOUSEKEEPING;LAUNDRY;SHOPPING;MEDICATION MANAGEMENT;FINANCES;DRIVING
ADLS_ACUITY_SCORE: 37
ADLS_ACUITY_SCORE: 34
ADLS_ACUITY_SCORE: 37
ADLS_ACUITY_SCORE: 37
ADLS_ACUITY_SCORE: 34
ADLS_ACUITY_SCORE: 35
ADLS_ACUITY_SCORE: 37
ADLS_ACUITY_SCORE: 34
ADLS_ACUITY_SCORE: 34
ADLS_ACUITY_SCORE: 37
ADLS_ACUITY_SCORE: 34

## 2023-03-25 NOTE — PROGRESS NOTES
[facility] Progress Note - Hospitalist Service    Date of Admission:  3/23/2023    Assessment & Plan   72 year old female with history of rheumatoid arthritis, osteoporosis, GERD. Recently evaluated by cardiology for anginal chest pain. Coronary CT angiogram confirmed obstructive coronary disease. Patient was scheduled for coronary angiogram. Procedure was done on 3/23, noted severe tortuosity and calcification of RCA, mid RCA stent was placed, unfortunately, coronary perforation of mid RCA. Patient decompensated, patient was intubated, PEA arrest, brief CPR , 5 min approx, pericardial tamponade, pericardiocentesis was done. New RCA stent was placed with cessation of bleeding. IABP was placed. CV surgery consulted. Emergent CABG x 1 with RSVG to RPDA, repair of bleeding sites at right atrial- RCA junction.  ml. Admitted to ICU.  Transferred out of ICU 3/25/23.    Acute hypoxic respiratory failure:  s/p intubation 3/23.  Extubated 3/24. On room air.    S/p emergent CABG x 1:  RSVG to RPDA, repair of bleeding sites at right atrial- RCA junction on 3/23.  S/p IABP 3/23.  -metoprolol 12.5mg BID  -asa 162 mg every day  -lipitor 80mg every day  -lasix 40mg IV q12  -per [facility]    PEA cardiac arrest: secondary to pericardial tamponade 3/23.  Brief CPR, s/p drainage of pericardial effusion 350ml on 3/23/23.   -per [facility]    CAD: anginal symptoms, s/p coronary angiogram, procedure complicated with RCA perforation 3/23 resultant in emergent CABG x1.    Fever: resolved. Reactive. Leukocytosis resolved CXR without infiltrates. tracheal culture NGTD, MRSA nasal screen negative,  and U/A no e/o infection.     ABLA: Hgb 9.2.    Rheumatoid arthritis  -Plaquenil and sulfasalazine to be resumed in 2 weeks per [facility]    GERD  -PPI        Diet: Full Liquid Diet    DVT Prophylaxis: heparin 5K q8  Case Catheter: Not present  Lines: None     Cardiac Monitoring: ACTIVE order. Indication: Post-  "PCI/Angiogram (24 hours)  Code Status: Full Code      Clinically Significant Risk Factors         # Hypernatremia: Highest Na = 146 mmol/L in last 2 days, will monitor as appropriate  # Hypocalcemia: Lowest Ca = 8.3 mg/dL in last 2 days, will monitor and replace as appropriate               # Overweight: Estimated body mass index is 28.42 kg/m  as calculated from the following:    Height as of this encounter: 1.727 m (5' 8\").    Weight as of this encounter: 84.8 kg (186 lb 14.4 oz)., PRESENT ON ADMISSION         Disposition Plan             Morgan Guerin DO, DO  Hospitalist Service  [facility]  Securely message with [employer] (more info)  Text page via [employer] Paging/Directory   ______________________________________________________________________    Interval History   Afebrile. Transferred to general tele floor    Physical Exam   Vital Signs: Temp: 98.1  F (36.7  C) Temp src: Oral BP: 108/52 Pulse: 82   Resp: 18 SpO2: 90 % O2 Device: None (Room air) Oxygen Delivery: 2 LPM  Weight: 186 lbs 14.4 oz  gen nad  cv rrr  Lungs decreased bases, non labored  Neuro a&o    Lab/imaging reviewed  "

## 2023-03-25 NOTE — PLAN OF CARE
[facility] - ICU    RN Progress Note:            Pertinent Assessments:      Please refer to flowsheet rows for full assessment     Mobility, Urine Output.         Mobility Level:     3 - Sit      Barriers to Progression: none. Pt dangled and stood last evening without incident, will progress to chair this a.m         Key Events - This Shift:   Nauseated on evening shift but better now after compazine. Low P.O intake due to nausea. Urine output borderline.VSS and afebrile. IS to 1000 with encouragement.             Barriers to Discharge / Downgrade:     Hemodynamically stable, invasive lines pulled yesterday evening. Should be able to downgrade       Problem: Cardiovascular Surgery  Goal: Improved Activity Tolerance  Outcome: Progressing     Problem: Cardiovascular Surgery  Goal: Effective Cardiac Function  Outcome: Progressing     Problem: Cardiovascular Surgery  Goal: Fluid and Electrolyte Balance  Outcome: Progressing     Problem: Cardiovascular Surgery  Goal: Effective Oxygenation and Ventilation  Outcome: Progressing  Intervention: Promote Airway Secretion Clearance  Recent Flowsheet Documentation  Taken 3/25/2023 0400 by Analisa Arias, RN  Administration (IS): instruction provided, follow-up  Cough And Deep Breathing: done with encouragement  Patient Tolerance (IS): good    Goal Outcome Evaluation:      Plan of Care Reviewed With: patient    Overall Patient Progress: improving Overall Patient Progress: improving    Outcome Evaluation: VSS off pressors

## 2023-03-25 NOTE — PLAN OF CARE
Problem: Plan of Care - These are the overarching goals to be used throughout the patient stay.    Goal: Optimal Comfort and Wellbeing  3/25/2023 1858 by Morgan Kong, RN  Outcome: Progressing  3/25/2023 1858 by Morgan Kong RN  Outcome: Progressing  Intervention: Monitor Pain and Promote Comfort  Recent Flowsheet Documentation  Taken 3/25/2023 1758 by Morgan Kong, RN  Pain Management Interventions: (Aqua K pad)   medication (see MAR)   other (see comments)  Taken 3/25/2023 1131 by Morgan Kong RN  Pain Management Interventions: emotional support      Patient Name: Antonia Everett   MRN: [id number]   Date of Admission: 3/23/2023    Procedure: Procedure(s):  CORONARY ARTERY BYPASS GRAFT X1 (CABG), EPIAORTIC ULTRASOUND, ENDOVEIN HARVEST FROM LEFT LOWER EXTREMITY    Post Op day #:2     Subjective (Patient focus/Primary Problem for shift): Sternal incision pain          Pain Goal 2 Pain Rating 5-7/10           Pain Medication/ Regime effective to reduce patient pain IV Toradol and scheduled Tylenol    Objective (Physical assessment):           Rhythm: normal sinus rhythm            Bowel Activity: no if Yes indicate when: Passing flatus (poor appetite due to nausea that is intermittent. Only eating full liquids in small amounts. Also, unable to eat solids due to bruised tongue from extubation.)          Bowel Medications: yes            Incision: healing well          Incentive Spirometry Q 1-2 hour when awake:  yes Volume: 1000 ml          Epicardial Pacing Wires:  no            Patient Activity:           Up to chair for meals: yes          Ambulation with RN x2 (Not including CR): no (up to bathroom several times)           Is patient in home clothes:no             Chest Tubes                 Mediastinal: no (removed 2 tubes this morning; dressing clean, dry and intact)                 Dressing Change Daily:no If No, why? Chest tube insertion site dressings to be removed tomorrow. Will shower  tomorrow.                     Urinary Catheter: no (removed this afternoon)           Preventative WOC consult (need MD order): not applicable       Assessment (Nursing primary shift focus): Lung sounds clear/diminished. SpO2 90-92% on 2 lpm O2. Continues to complains of incisional sternal pain and chronic low back pain. Pain managed with air bed, prn IV Toradol, repositioning, and scheduled Tylenol. NSR on tele. Vitally stable. Case removed around noon. Good output with IV lasix. Emptying bladder without issues. No BM yet today. Only eating small amounts of thick liquids due to intermittent nausea and bruised tongue from extubation.    Plan (Patient Care Plan/focus): Continue to encourage incentive spirometer and ambulation.      Morgan Kong RN   3/25/2023   6:58 PM

## 2023-03-25 NOTE — PROGRESS NOTES
03/25/23 0831   Appointment Info   Signing Clinician's Name / Credentials (OT) Verna Cabrera OT   Living Environment   People in Home spouse   Current Living Arrangements house   Home Accessibility stairs to enter home;stairs within home   Number of Stairs, Main Entrance 3   Stair Railings, Main Entrance railings safe and in good condition   Number of Stairs, Within Home, Primary greater than 10 stairs   Stair Railings, Within Home, Primary railings safe and in good condition   Transportation Anticipated family or friend will provide   Living Environment Comments was independent w/her ADls and mobility   Self-Care   Usual Activity Tolerance good   Current Activity Tolerance fair   Equipment Currently Used at Home none   Fall history within last six months no   Instrumental Activities of Daily Living (IADL)   Previous Responsibilities meal prep;housekeeping;laundry;shopping;medication management;finances;driving  ( assists PRN)   General Information   Onset of Illness/Injury or Date of Surgery 03/23/23   Referring Physician Dr Sanders   Patient/Family Therapy Goal Statement (OT) go home   Existing Precautions/Restrictions cardiac;other (see comments)  (sternal)   Limitations/Impairments other (see comments)  (none)   Left Upper Extremity (Weight-bearing Status) full weight-bearing (FWB)   Right Upper Extremity (Weight-bearing Status) full weight-bearing (FWB)   Left Lower Extremity (Weight-bearing Status) full weight-bearing (FWB)   Right Lower Extremity (Weight-bearing Status) full weight-bearing (FWB)   Cognitive Status Examination   Orientation Status orientation to person, place and time   Behavioral Issues other (see comments)  (none)   Affect/Mental Status (Cognitive) WNL   Follows Commands WNL   Visual Perception   Visual Impairment/Limitations corrective lenses full-time   Sensory   Sensory Quick Adds left LE;right LE   Pain Assessment   Patient Currently in Pain Yes, see Vital Sign flowsheet    Posture   Posture not impaired   Range of Motion Comprehensive   General Range of Motion no range of motion deficits identified   Strength Comprehensive (MMT)   General Manual Muscle Testing (MMT) Assessment no strength deficits identified   Muscle Tone Assessment   Muscle Tone Quick Adds No deficits were identified   Coordination   Upper Extremity Coordination No deficits were identified   Bed Mobility   Comment (Bed Mobility) CGA   Transfers   Transfer Comments CGA   Balance   Balance Assessment no deficits were identified   Activities of Daily Living   BADL Assessment/Intervention lower body dressing   Lower Body Dressing Assessment/Training   Comment, (Lower Body Dressing) CGA   Clinical Impression   Criteria for Skilled Therapeutic Interventions Met (OT) Yes, treatment indicated   OT Diagnosis CABG   Influenced by the following impairments weakness, fatigue, decreased ADLs   OT Problem List-Impairments impacting ADL balance;strength   Assessment of Occupational Performance 3-5 Performance Deficits   Identified Performance Deficits weakenss, fatigue, decreased ADLs   Planned Therapy Interventions (OT) ADL retraining   Clinical Decision Making Complexity (OT) moderate complexity   Anticipated Equipment Needs Upon Discharge (OT) shower chair   Risk & Benefits of therapy have been explained evaluation/treatment results reviewed;care plan/treatment goals reviewed   OT Total Evaluation Time   OT Eval, Moderate Complexity Minutes (00729) 15   OT Goals   Therapy Frequency (OT) 2 times/wk   OT Predicted Duration/Target Date for Goal Attainment 04/01/23   OT Goals Cardiac Phase 1   OT: Understanding of cardiac education to maximize quality of life, condition management, and health outcomes Patient;Caregiver;Verbalize   OT: Perform aerobic activity with stable cardiovascular response 10 minutes;continuous   OT: Functional/aerobic ambulation tolerance with stable cardiovascular response in order to return to home and  community environment Modified independent;Greater than 300 feet   OT: Navigation of stairs simulating home set up with stable cardiovascular response in order to return to home and community environment Greater than 10 stairs   Self-Care/Home Management   Self-Care/Home Mgmt/ADL, Compensatory, Meal Prep Minutes (47156) 8   Symptoms Noted During/After Treatment (Meal Preparation/Planning Training) fatigue   Treatment Detail/Skilled Intervention transfers CGA w/out a device, CABG precaution review   Therapeutic Procedures/Exercise   Therapeutic Procedure: strength, endurance, ROM, flexibillity minutes (17139) 8   Symptoms Noted During/After Treatment fatigue   Treatment Detail/Skilled Intervention see below for further information   Treatment Time Includes (CR Only) See specific exercise details intervention group(s)   Calisthenics   Type Hip Abductors;Hip Flexor: kicks;Knee Flexion;Knee Bends  (ankle pumps)   Cardiovascular Response Normal   Exercise Details completed 10 reps x5 ex w/visual cues   Vital Signs Details before /60, HR 86, O2 98, after /59, HR 86, O2 99   Cardiac Education   Education Provided Precautions   Education Packet Given to Patient Yes   All Patient Education Handouts Reviewed with Patient and/or Family No   Cardiac Rehab Phase II Plan   Phase II Order Received No   OT Discharge Planning   OT Plan transfers, ambulation, LE cals, precautions   OT Discharge Recommendation (DC Rec) home with outpatient cardiac rehab   OT Rationale for DC Rec pt is safe to return home w/ who can assist pt PRN   OT Brief overview of current status CGA w/ADLs and mobility

## 2023-03-25 NOTE — PROGRESS NOTES
CVTS Daily Progress Note   POD#2 s/p emergent CABGx1  Attending: Jaun  LOS: 2    SUBJECTIVE/INTERVAL EVENTS:    No acute events overnight. IABP removed yesterday. Patient was subsequently extubated and weaned from pressors; normotensive. NSR. Maintaining oxygen saturations on nasal cannula. Patient progressing overall well. Up to chair this AM. Pain well controlled although does complain of some chronic back pain. - BM / flatus. Tolerating diet without nausea. UOP borderline adequate. Chest tube output appropriate. Patient denies new chest pain, SOB, calf pain, abdominal pain. She has no questions today.      OBJECTIVE:  Temp:  [97.5  F (36.4  C)-100.9  F (38.3  C)] 98.5  F (36.9  C)  Pulse:  [68-85] 83  Resp:  [9-22] 9  BP: ()/(42-70) 131/63  MAP:  [38 mmHg-80 mmHg] 38 mmHg  Arterial Line BP: ()/(30-61) 48/30  FiO2 (%):  [30 %] 30 %  SpO2:  [91 %-99 %] 91 %  Vent Mode: CPAP/PS  (Continuous positive airway pressure with Pressure Support)  FiO2 (%): 30 %  Resp Rate (Set): 16 breaths/min  Tidal Volume (Set, mL): 450 mL  PEEP (cm H2O): 5 cmH2O  Pressure Support (cm H2O): 5 cmH2O  Resp: (!) 9    Vitals:    03/23/23 0645 03/24/23 0600 03/25/23 0600   Weight: 80.7 kg (178 lb) 82.6 kg (182 lb 3.2 oz) 84.8 kg (186 lb 14.4 oz)       Current Medications:    Scheduled Meds:    acetaminophen  975 mg Oral Q8H     aspirin  81 mg Oral Daily     atorvastatin  80 mg Oral Daily     furosemide  40 mg Intravenous BID     heparin ANTICOAGULANT  5,000 Units Subcutaneous Q8H     [Held by provider] hydroxychloroquine  400 mg Oral Daily     lidocaine   Transdermal Q8H REYNALDO     metoprolol tartrate  12.5 mg Oral BID     pantoprazole  40 mg Oral or NG Tube Daily    Or     pantoprazole  40 mg Oral Daily     polyethylene glycol  17 g Oral Daily     senna-docusate  1 tablet Oral BID     sodium chloride (PF)  3 mL Intracatheter Q8H     [Held by provider] sulfaSALAzine ER  1,000 mg Oral BID     Continuous Infusions:    PRN  Meds:.acetaminophen, bisacodyl, calcium gluconate, calcium gluconate, calcium gluconate, glucose **OR** dextrose **OR** glucagon, glucose **OR** dextrose **OR** glucagon, hydrALAZINE, hydrALAZINE, HYDROmorphone **OR** HYDROmorphone, ketorolac, lactated ringers, Lidocaine, lidocaine 4%, lidocaine (buffered or not buffered), magnesium hydroxide, metoprolol, ondansetron **OR** ondansetron, oxyCODONE **OR** oxyCODONE, prochlorperazine **OR** prochlorperazine, sodium chloride (PF), sodium chloride (PF)    Cardiographics:    Telemetry monitoring demonstrates NSR with rates in the 80s per my personal review.    Imaging:  Results for orders placed or performed during the hospital encounter of 03/23/23   XR Chest Port 1 View    Impression    IMPRESSION: Poststernotomy. Endotracheal tube tip located 2.5 cm above the danie. Right IJ [place]-Stuart catheter with the tip in the right pulmonary outflow tract. Questionable faintly seen mediastinal drain. No pneumothorax. Low lung volumes. Mild left   basilar atelectasis. No pleural effusion. Normal heart size. No overt vascular congestion or pulmonary edema. Thoracic spinal stimulator.   XR Chest Port 1 View    Impression    IMPRESSION: Endotracheal tube 3.2 cm above the danie. Right IJ [place]-Stuart catheter tip in the main pulmonary artery. Mediastinal drains. Cardiac pacing wires. Sternotomy wires and mediastinal clips. Spinal stimulator.    Lungs are clear. No pleural effusion or pneumothorax. No edema. Stable cardiomediastinal silhouette.       Labs, personally reviewed.  Hemoglobin   Date Value Ref Range Status   03/24/2023 9.2 (L) 11.7 - 15.7 g/dL Final   03/23/2023 9.9 (L) 11.7 - 15.7 g/dL Final   03/23/2023 8.9 (L) 11.7 - 15.7 g/dL Final     Hemoglobin POCT   Date Value Ref Range Status   03/23/2023 9.2 (L) 11.7 - 15.7 g/dL Final   03/23/2023 8.5 (L) 11.7 - 15.7 g/dL Final   03/23/2023 8.6 (L) 11.7 - 15.7 g/dL Final     WBC Count   Date Value Ref Range Status   03/24/2023 10.0  4.0 - 11.0 10e3/uL Final   03/23/2023 17.6 (H) 4.0 - 11.0 10e3/uL Final   03/23/2023 15.6 (H) 4.0 - 11.0 10e3/uL Final     Platelet Count   Date Value Ref Range Status   03/24/2023 133 (L) 150 - 450 10e3/uL Final   03/23/2023 152 150 - 450 10e3/uL Final   03/23/2023 149 (L) 150 - 450 10e3/uL Final     Creatinine   Date Value Ref Range Status   03/25/2023 0.82 0.51 - 0.95 mg/dL Final   03/24/2023 0.80 0.51 - 0.95 mg/dL Final   03/24/2023 0.80 0.51 - 0.95 mg/dL Final     Potassium   Date Value Ref Range Status   03/25/2023 3.9 3.4 - 5.3 mmol/L Final   03/25/2023 3.9 3.4 - 5.3 mmol/L Final   03/24/2023 3.7 3.4 - 5.3 mmol/L Final   03/22/2023 4.5 3.5 - 5.0 mmol/L Final   06/24/2021 4.6 3.5 - 5.0 mmol/L Final     Potassium POCT   Date Value Ref Range Status   03/23/2023 4.0 3.5 - 5.0 mmol/L Final   03/23/2023 5.0 3.5 - 5.0 mmol/L Final   03/23/2023 4.5 3.5 - 5.0 mmol/L Final     Magnesium   Date Value Ref Range Status   03/25/2023 2.3 1.7 - 2.3 mg/dL Final   03/24/2023 2.6 (H) 1.7 - 2.3 mg/dL Final   03/23/2023 2.2 1.7 - 2.3 mg/dL Final          I/O:  I/O last 3 completed shifts:  In: 2535.89 [P.O.:220; I.V.:1315.89; Other:500; IV Piggyback:500]  Out: 1220 [Urine:930; Chest Tube:290]       Physical Exam:    General: Patient seen up in chair. NAD. Conversant and pleasant.  CV: RRR on monitor. 2+ peripheral pulses in all extremities. Mild edema.  Pulm: Non-labored effort on nasal cannula. Chest tubes in place, serosanguinous output, no air leak. Incision C/D/I.  Abd: Soft, NT, ND  : Case with long urine  Ext: Mild pedal edema, SCDs in place, warm, distal pulses intact  Neuro: CNs grossly intact      ASSESSMENT/PLAN:    Antonia Everett is a 72 year old female with a history of CAD who is POD#2 s/p CABGx1.    Principal Problem:    Coronary artery disease involving native heart, unspecified vessel or lesion type, unspecified whether angina present  Active Problems:    Coronary artery disease involving native heart without  angina pectoris, unspecified vessel or lesion type    Unstable angina pectoris (H)    Abnormal cardiovascular stress test    Positive cardiac stress test    Percutaneous transluminal coronary angioplasty status    Ruptured, coronary artery (H)        NEURO:   - Scheduled Tylenol/lidocaine patches and PRN Tylenol/oxycodone/dilaudid/Toradol for pain    CV:   - Normotensive off presosrs  - Metoprolol 12.5mg BID   - ASA 162mg  - Atorvastatin 80mg daily  - Chest tubes removed this AM  - IABP removed 3/24    PULM:   - Maintaining oxygen saturations on nasal cannula  - Encourage pulmonary toilet    FEN/GI:  - Continue electrolyte replacement protocol  - Diet: Cardiac ADAT  - Bowel regimen    RENAL:  - Adequate UOP/hr. Continue to monitor closely.  - Discontinue Case  - Diuresis with Lasix 40mg IV BID    HEME:  - Acute blood loss anemia post-op.   - No bleeding concerns. Hep SQ, ASA    ID:  - Inga op ppx complete, afebrile. No concerns for infection  - Will wait 2 weeks to restart PTA Plaquenil and sulfasalazine (RA)    ENDO:   - No issues    PPx:   - DVT: SCDs, SQ heparin TID, ambulation   - GI: Protonix 40mg PO daily    DISPO:   - Transfer to general telemetry status.        Patient discussed with Dr. Oh      _______  PAUL EugeneC  Cardiothoracic Surgery  [phone]

## 2023-03-25 NOTE — PROGRESS NOTES
ICU NOTE    Chart was reviewed.   Patient was extubated on 3/24.   Patient remains hemodynamically stable   Chest tubes were removed.   CV surgery is planning to transferred patient to telemetry floor.     ICU service will signed off.   Contact us if you have any questions.     Vargas Costa MD  Critical Care medicine  03/25/23 10:00 AM

## 2023-03-26 ENCOUNTER — APPOINTMENT (OUTPATIENT)
Dept: OCCUPATIONAL THERAPY | Facility: HOSPITAL | Age: 73
DRG: 907 | End: 2023-03-26
Attending: INTERNAL MEDICINE
Payer: MEDICARE

## 2023-03-26 LAB
ANION GAP SERPL CALCULATED.3IONS-SCNC: 7 MMOL/L (ref 7–15)
BACTERIA ASPIRATE CULT: NORMAL
BUN SERPL-MCNC: 21.6 MG/DL (ref 8–23)
CALCIUM SERPL-MCNC: 8.3 MG/DL (ref 8.8–10.2)
CALCIUM, IONIZED MEASURED: 1.13 MMOL/L (ref 1.11–1.3)
CHLORIDE SERPL-SCNC: 104 MMOL/L (ref 98–107)
CREAT SERPL-MCNC: 0.83 MG/DL (ref 0.51–0.95)
DEPRECATED HCO3 PLAS-SCNC: 29 MMOL/L (ref 22–29)
ERYTHROCYTE [DISTWIDTH] IN BLOOD BY AUTOMATED COUNT: 13 % (ref 10–15)
GFR SERPL CREATININE-BSD FRML MDRD: 75 ML/MIN/1.73M2
GLUCOSE SERPL-MCNC: 112 MG/DL (ref 70–99)
HCT VFR BLD AUTO: 22.5 % (ref 35–47)
HGB BLD-MCNC: 7.2 G/DL (ref 11.7–15.7)
HGB BLD-MCNC: 7.9 G/DL (ref 11.7–15.7)
ION CA PH 7.4: 1.17 MMOL/L (ref 1.11–1.3)
MAGNESIUM SERPL-MCNC: 2.1 MG/DL (ref 1.7–2.3)
MCH RBC QN AUTO: 32.4 PG (ref 26.5–33)
MCHC RBC AUTO-ENTMCNC: 32 G/DL (ref 31.5–36.5)
MCV RBC AUTO: 101 FL (ref 78–100)
PH: 7.45 (ref 7.35–7.45)
PHOSPHATE SERPL-MCNC: 2 MG/DL (ref 2.5–4.5)
PLATELET # BLD AUTO: 99 10E3/UL (ref 150–450)
POTASSIUM SERPL-SCNC: 3.3 MMOL/L (ref 3.4–5.3)
POTASSIUM SERPL-SCNC: 3.6 MMOL/L (ref 3.4–5.3)
RBC # BLD AUTO: 2.22 10E6/UL (ref 3.8–5.2)
SODIUM SERPL-SCNC: 140 MMOL/L (ref 136–145)
WBC # BLD AUTO: 7 10E3/UL (ref 4–11)

## 2023-03-26 PROCEDURE — 250N000013 HC RX MED GY IP 250 OP 250 PS 637: Performed by: PHYSICIAN ASSISTANT

## 2023-03-26 PROCEDURE — 80048 BASIC METABOLIC PNL TOTAL CA: CPT | Performed by: PHYSICIAN ASSISTANT

## 2023-03-26 PROCEDURE — 36415 COLL VENOUS BLD VENIPUNCTURE: CPT | Performed by: PHYSICIAN ASSISTANT

## 2023-03-26 PROCEDURE — 97535 SELF CARE MNGMENT TRAINING: CPT | Mod: GO

## 2023-03-26 PROCEDURE — 85027 COMPLETE CBC AUTOMATED: CPT | Performed by: PHYSICIAN ASSISTANT

## 2023-03-26 PROCEDURE — 250N000013 HC RX MED GY IP 250 OP 250 PS 637: Performed by: INTERNAL MEDICINE

## 2023-03-26 PROCEDURE — 210N000001 HC R&B IMCU HEART CARE

## 2023-03-26 PROCEDURE — 250N000011 HC RX IP 250 OP 636: Performed by: PHYSICIAN ASSISTANT

## 2023-03-26 PROCEDURE — 97110 THERAPEUTIC EXERCISES: CPT | Mod: GO

## 2023-03-26 PROCEDURE — 36415 COLL VENOUS BLD VENIPUNCTURE: CPT | Performed by: INTERNAL MEDICINE

## 2023-03-26 PROCEDURE — 99232 SBSQ HOSP IP/OBS MODERATE 35: CPT | Performed by: INTERNAL MEDICINE

## 2023-03-26 PROCEDURE — 84132 ASSAY OF SERUM POTASSIUM: CPT | Performed by: INTERNAL MEDICINE

## 2023-03-26 PROCEDURE — G0463 HOSPITAL OUTPT CLINIC VISIT: HCPCS

## 2023-03-26 PROCEDURE — 999N000157 HC STATISTIC RCP TIME EA 10 MIN

## 2023-03-26 PROCEDURE — 94799 UNLISTED PULMONARY SVC/PX: CPT

## 2023-03-26 PROCEDURE — 85018 HEMOGLOBIN: CPT | Performed by: INTERNAL MEDICINE

## 2023-03-26 PROCEDURE — 83735 ASSAY OF MAGNESIUM: CPT | Performed by: PHYSICIAN ASSISTANT

## 2023-03-26 PROCEDURE — 84100 ASSAY OF PHOSPHORUS: CPT | Performed by: PHYSICIAN ASSISTANT

## 2023-03-26 PROCEDURE — 82330 ASSAY OF CALCIUM: CPT | Performed by: PHYSICIAN ASSISTANT

## 2023-03-26 RX ORDER — POTASSIUM CHLORIDE 1500 MG/1
40 TABLET, EXTENDED RELEASE ORAL ONCE
Status: COMPLETED | OUTPATIENT
Start: 2023-03-26 | End: 2023-03-26

## 2023-03-26 RX ORDER — METOPROLOL TARTRATE 25 MG/1
25 TABLET, FILM COATED ORAL 2 TIMES DAILY
Status: DISCONTINUED | OUTPATIENT
Start: 2023-03-26 | End: 2023-03-27 | Stop reason: HOSPADM

## 2023-03-26 RX ADMIN — POTASSIUM CHLORIDE 40 MEQ: 1500 TABLET, EXTENDED RELEASE ORAL at 09:30

## 2023-03-26 RX ADMIN — POTASSIUM & SODIUM PHOSPHATES POWDER PACK 280-160-250 MG 1 PACKET: 280-160-250 PACK at 13:43

## 2023-03-26 RX ADMIN — POTASSIUM & SODIUM PHOSPHATES POWDER PACK 280-160-250 MG 1 PACKET: 280-160-250 PACK at 09:35

## 2023-03-26 RX ADMIN — FUROSEMIDE 40 MG: 10 INJECTION, SOLUTION INTRAVENOUS at 09:32

## 2023-03-26 RX ADMIN — METOPROLOL TARTRATE 25 MG: 25 TABLET, FILM COATED ORAL at 09:30

## 2023-03-26 RX ADMIN — METOPROLOL TARTRATE 25 MG: 25 TABLET, FILM COATED ORAL at 20:32

## 2023-03-26 RX ADMIN — POTASSIUM & SODIUM PHOSPHATES POWDER PACK 280-160-250 MG 1 PACKET: 280-160-250 PACK at 17:40

## 2023-03-26 RX ADMIN — SENNOSIDES AND DOCUSATE SODIUM 1 TABLET: 50; 8.6 TABLET ORAL at 09:30

## 2023-03-26 RX ADMIN — HEPARIN SODIUM 5000 UNITS: 5000 INJECTION, SOLUTION INTRAVENOUS; SUBCUTANEOUS at 05:15

## 2023-03-26 RX ADMIN — ATORVASTATIN CALCIUM 80 MG: 40 TABLET, FILM COATED ORAL at 09:30

## 2023-03-26 RX ADMIN — HEPARIN SODIUM 5000 UNITS: 5000 INJECTION, SOLUTION INTRAVENOUS; SUBCUTANEOUS at 13:43

## 2023-03-26 RX ADMIN — PANTOPRAZOLE SODIUM 40 MG: 40 TABLET, DELAYED RELEASE ORAL at 09:30

## 2023-03-26 RX ADMIN — ASPIRIN 81 MG: 81 TABLET, COATED ORAL at 09:30

## 2023-03-26 RX ADMIN — ACETAMINOPHEN 975 MG: 325 TABLET ORAL at 00:08

## 2023-03-26 RX ADMIN — SENNOSIDES AND DOCUSATE SODIUM 1 TABLET: 50; 8.6 TABLET ORAL at 20:32

## 2023-03-26 RX ADMIN — HEPARIN SODIUM 5000 UNITS: 5000 INJECTION, SOLUTION INTRAVENOUS; SUBCUTANEOUS at 20:31

## 2023-03-26 RX ADMIN — POLYETHYLENE GLYCOL 3350 17 G: 17 POWDER, FOR SOLUTION ORAL at 09:30

## 2023-03-26 RX ADMIN — FUROSEMIDE 40 MG: 10 INJECTION, SOLUTION INTRAVENOUS at 14:47

## 2023-03-26 ASSESSMENT — ACTIVITIES OF DAILY LIVING (ADL)
ADLS_ACUITY_SCORE: 26
ADLS_ACUITY_SCORE: 24
ADLS_ACUITY_SCORE: 28
ADLS_ACUITY_SCORE: 26
ADLS_ACUITY_SCORE: 26
ADLS_ACUITY_SCORE: 24
ADLS_ACUITY_SCORE: 26
ADLS_ACUITY_SCORE: 24
ADLS_ACUITY_SCORE: 28
ADLS_ACUITY_SCORE: 28

## 2023-03-26 NOTE — PLAN OF CARE
Patient Name: Antonia Everett   MRN: [id number]   Date of Admission: 3/23/2023    Procedure: Procedure(s):  CORONARY ARTERY BYPASS GRAFT X1 (CABG), EPIAORTIC ULTRASOUND, ENDOVEIN HARVEST FROM LEFT LOWER EXTREMITY    Post Op day #:3    Subjective (Patient focus/Primary Problem for shift):           Pain Goal: No pain Pain Ratin/10          Pain Medication/ Regime effective to reduce patient pain: Scheduled tylenol     Objective (Physical assessment):           Rhythm: normal sinus rhythm            Bowel Activity: yes if Yes indicate when: 3/26          Bowel Medications: yes            Incision: healing well          Incentive Spirometry Q 1-2 hour when awake:  yes Volume:           Epicardial Pacing Wires:  no            Patient Activity:           Up to chair for meals: yes          Ambulation with RN x2 (Not including CR): yes            Is patient in home clothes:no             Chest Tubes   Pleural: no Draining: not applicable               Suction: not applicable              Mediastinal: no Draining: not applicable               Suction: not applicable   Dressing Change Daily:yes If No, why?                     Urinary Catheter: no           Preventative WOC consult (need MD order): no       Assessment (Nursing primary shift focus): Pt ambulating very well with SBA and heart pillow. Walking to the bathroom and was able to have a BM and multiple urine occurrences this shift. NSR. On 2L NC in order to keep O2 sats in low 90s. A/O. VSS.    Plan (Patient Care Plan/focus): Pt educated on importance of IS and how to continue to use it effectively. Plan for shower today. Pt up to chair for meals and walking with staff multiple times today. Has had a BM so continue bowel meds.       Kati Larson RN   3/26/2023   6:18 AM

## 2023-03-26 NOTE — PROGRESS NOTES
CVTS Daily Progress Note   POD#3 s/p emergent CABGx1  Attending: Jaun  LOS: 3    SUBJECTIVE/INTERVAL EVENTS:    No acute events overnight. Normotensive/borderline HTN. NSR. Maintaining oxygen saturations on room air this AM. Patient progressing overall well. Up to chair and ambulating with therapy. Pain well controlled. - BM / +flatus. Tolerating diet without nausea. UOP adequate. Chest tubes removed yesterday. Patient denies new chest pain, SOB, calf pain, abdominal pain. She has no questions today but is looking forward to leaving the hospital.      OBJECTIVE:  Temp:  [97.5  F (36.4  C)-99.7  F (37.6  C)] 98  F (36.7  C)  Pulse:  [80-99] 99  Resp:  [9-18] 18  BP: (108-150)/(52-75) 132/75  SpO2:  [90 %-100 %] 95 %  Resp: 18    Vitals:    03/23/23 0645 03/24/23 0600 03/25/23 0600 03/26/23 0345   Weight: 80.7 kg (178 lb) 82.6 kg (182 lb 3.2 oz) 84.8 kg (186 lb 14.4 oz) 82.4 kg (181 lb 9.6 oz)       Current Medications:    Scheduled Meds:    acetaminophen  975 mg Oral Q8H     aspirin  81 mg Oral Daily     atorvastatin  80 mg Oral Daily     furosemide  40 mg Intravenous BID     heparin ANTICOAGULANT  5,000 Units Subcutaneous Q8H     [Held by provider] hydroxychloroquine  400 mg Oral Daily     lidocaine   Transdermal Q8H REYNALDO     metoprolol tartrate  25 mg Oral BID     pantoprazole  40 mg Oral or NG Tube Daily    Or     pantoprazole  40 mg Oral Daily     polyethylene glycol  17 g Oral Daily     potassium & sodium phosphates  1 packet Oral or Feeding Tube Q4H     potassium chloride  40 mEq Oral Once     senna-docusate  1 tablet Oral BID     sodium chloride (PF)  3 mL Intracatheter Q8H     [Held by provider] sulfaSALAzine ER  1,000 mg Oral BID     Continuous Infusions:    PRN Meds:.acetaminophen, bisacodyl, calcium gluconate, calcium gluconate, calcium gluconate, glucose **OR** dextrose **OR** glucagon, hydrALAZINE, hydrALAZINE, ketorolac, lactated ringers, Lidocaine, lidocaine 4%, lidocaine (buffered or not buffered),  magnesium hydroxide, metoprolol, ondansetron **OR** ondansetron, oxyCODONE **OR** oxyCODONE, prochlorperazine **OR** prochlorperazine, sodium chloride (PF), sodium chloride (PF)    Cardiographics:    Telemetry monitoring demonstrates NSR with rates in the 80s per my personal review.    Imaging:  Results for orders placed or performed during the hospital encounter of 03/23/23   XR Chest Port 1 View    Impression    IMPRESSION: Poststernotomy. Endotracheal tube tip located 2.5 cm above the danie. Right IJ Foster-Stuart catheter with the tip in the right pulmonary outflow tract. Questionable faintly seen mediastinal drain. No pneumothorax. Low lung volumes. Mild left   basilar atelectasis. No pleural effusion. Normal heart size. No overt vascular congestion or pulmonary edema. Thoracic spinal stimulator.   XR Chest Port 1 View    Impression    IMPRESSION: Endotracheal tube 3.2 cm above the danie. Right IJ Foster-Stuart catheter tip in the main pulmonary artery. Mediastinal drains. Cardiac pacing wires. Sternotomy wires and mediastinal clips. Spinal stimulator.    Lungs are clear. No pleural effusion or pneumothorax. No edema. Stable cardiomediastinal silhouette.       Labs, personally reviewed.  Hemoglobin   Date Value Ref Range Status   03/26/2023 7.2 (L) 11.7 - 15.7 g/dL Final   03/24/2023 9.2 (L) 11.7 - 15.7 g/dL Final   03/23/2023 9.9 (L) 11.7 - 15.7 g/dL Final     Hemoglobin POCT   Date Value Ref Range Status   03/23/2023 9.2 (L) 11.7 - 15.7 g/dL Final   03/23/2023 8.5 (L) 11.7 - 15.7 g/dL Final   03/23/2023 8.6 (L) 11.7 - 15.7 g/dL Final     WBC Count   Date Value Ref Range Status   03/26/2023 7.0 4.0 - 11.0 10e3/uL Final   03/24/2023 10.0 4.0 - 11.0 10e3/uL Final   03/23/2023 17.6 (H) 4.0 - 11.0 10e3/uL Final     Platelet Count   Date Value Ref Range Status   03/26/2023 99 (L) 150 - 450 10e3/uL Final   03/24/2023 133 (L) 150 - 450 10e3/uL Final   03/23/2023 152 150 - 450 10e3/uL Final     Creatinine   Date Value Ref  Range Status   03/26/2023 0.83 0.51 - 0.95 mg/dL Final   03/25/2023 0.82 0.51 - 0.95 mg/dL Final   03/24/2023 0.80 0.51 - 0.95 mg/dL Final     Potassium   Date Value Ref Range Status   03/26/2023 3.3 (L) 3.4 - 5.3 mmol/L Final   03/25/2023 3.9 3.4 - 5.3 mmol/L Final   03/25/2023 3.9 3.4 - 5.3 mmol/L Final   03/22/2023 4.5 3.5 - 5.0 mmol/L Final   06/24/2021 4.6 3.5 - 5.0 mmol/L Final     Potassium POCT   Date Value Ref Range Status   03/23/2023 4.0 3.5 - 5.0 mmol/L Final   03/23/2023 5.0 3.5 - 5.0 mmol/L Final   03/23/2023 4.5 3.5 - 5.0 mmol/L Final     Magnesium   Date Value Ref Range Status   03/26/2023 2.1 1.7 - 2.3 mg/dL Final   03/25/2023 2.3 1.7 - 2.3 mg/dL Final   03/24/2023 2.6 (H) 1.7 - 2.3 mg/dL Final          I/O:  I/O last 3 completed shifts:  In: 483 [P.O.:480; I.V.:3]  Out: 2325 [Urine:2325]       Physical Exam:    General: Patient seen up in chair. NAD. Conversant and pleasant.  CV: RRR on monitor. 2+ peripheral pulses in all extremities. Mild edema.  Pulm: Non-labored effort on room air. Incision C/D/I.  Abd: Soft, NT, ND  Ext: Mild pedal edema, SCDs in place, warm, distal pulses intact  Neuro: CNs grossly intact      ASSESSMENT/PLAN:    Antonia Everett is a 72 year old female with a history of CAD who is POD#3 s/p CABGx1.    Principal Problem:    Coronary artery disease involving native heart, unspecified vessel or lesion type, unspecified whether angina present  Active Problems:    Coronary artery disease involving native heart without angina pectoris, unspecified vessel or lesion type    Unstable angina pectoris (H)    Abnormal cardiovascular stress test    Positive cardiac stress test    Percutaneous transluminal coronary angioplasty status    Ruptured, coronary artery (H)        NEURO:   - Scheduled Tylenol/lidocaine patches and PRN Tylenol/oxycodone/Toradol for pain    CV:   - Normotensive/borderline HTN  - Metoprolol 12.5mg BID  Increased to 25mg BID  - ASA 162mg  - Atorvastatin 80mg daily  -  Chest tubes removed 3/25  - IABP removed 3/24    PULM:   - Maintaining oxygen saturations on room air  - Encourage pulmonary toilet    FEN/GI:  - Continue electrolyte replacement protocol  - Diet: Cardiac ADAT  - Bowel regimen    RENAL:  - Adequate UOP/hr. Continue to monitor closely.  - Diuresis with Lasix 40mg IV BID    HEME:  - Acute blood loss anemia post-op.   - No bleeding concerns. Hep SQ, ASA    ID:  - Inga op ppx complete, afebrile. No concerns for infection  - Will wait 2 weeks to restart PTA Plaquenil and sulfasalazine (RA)    ENDO:   - No issues    PPx:   - DVT: SCDs, SQ heparin TID, ambulation   - GI: Protonix 40mg PO daily    DISPO:   - General telemetry status; anticipate discharge to home in 24-48 hours.        Patient discussed with Dr. Oh      _______  Maria M Tam PA-C  Cardiothoracic Surgery  [phone]

## 2023-03-26 NOTE — PLAN OF CARE
"  Problem: Cardiovascular Surgery  Goal: Nausea and Vomiting Relief  Outcome: Progressing     Problem: Cardiovascular Surgery  Goal: Effective Oxygenation and Ventilation  Outcome: Progressing  Intervention: Promote Airway Secretion Clearance  Recent Flowsheet Documentation  Taken 3/26/2023 0945 by Morgan Kong RN  Administration (IS): proper technique demonstrated  Level Incentive Spirometer (mL): 1000  Number of Repetitions (IS): 10  Patient Tolerance (IS): good     Problem: Cardiovascular Surgery  Goal: Acceptable Pain Control  Outcome: Progressing  Intervention: Prevent or Manage Pain  Recent Flowsheet Documentation  Taken 3/26/2023 0945 by Morgan Kong RN  Pain Management Interventions: declines     sc  Patient Name: Antonia Everett   MRN: [id number]   Date of Admission: 3/23/2023    Procedure: Procedure(s):  CORONARY ARTERY BYPASS GRAFT X1 (CABG), EPIAORTIC ULTRASOUND, ENDOVEIN HARVEST FROM LEFT LOWER EXTREMITY    Post Op day #: 3    Subjective (Patient focus/Primary Problem for shift): Sternal incision pain          Pain Goal 2  Pain Rating 3           Pain Medication/ Regime effective to reduce patient pain Scheduled Tylenol and prn IV Toridol. Pain has not been an issue per pt and describes as \"mild\". Pt declined scheduled Tylenol and prn Toridol.    Objective (Physical assessment):           Rhythm: normal sinus rhythm            Bowel Activity: no (passing flatus) if Yes indicate when: n/a          Bowel Medications: yes            Incision: covered (chest tube insertion sites covered with bandaids)          Incentive Spirometry Q 1-2 hour when awake:  yes Volume: 1,250 ml          Epicardial Pacing Wires:  no            Patient Activity:           Up to chair for meals: yes          Ambulation with RN x2 (Not including CR): yes  (x3)           Is patient in home clothes:no             Chest Tubes                 Mediastinal: no (chest tubes removed yesterday morning). Now covered only with " "bandaids after shower this morning.                     Urinary Catheter: no           Preventative WOC consult (need MD order): not applicable       Assessment (Nursing primary shift focus): SpO2 95% on RA. Lungs/clear diminished. Ambulated 150 ft with RN and did not have SOB. No complaints of nausea today. Still poor appetite today, however now tolerating solids. No BM, although continues to have flatus and feels she will have one soon. Incision sites clean, dry, and intact. HgB 7.9 after recheck (7.2 in a.m.). Pain of low back and sternum described as much better today and \"mild\". NSR on tele. VSS.     Plan (Patient Care Plan/focus): Continue to encourage ambulation and incentive spirometer use.      Morgan Kong RN   3/26/2023   2:03 PM            "

## 2023-03-26 NOTE — PLAN OF CARE
Patient is on RA, Flutter valve done via mouthpiece. I.S patient obtained 1000 ml.  RCAT done. Will continue to monitor and assess as needed.      Nehemiah Jewell, RT

## 2023-03-26 NOTE — PROGRESS NOTES
[facility] Progress Note - Hospitalist Service    Date of Admission:  3/23/2023    Assessment & Plan   72 year old female with history of rheumatoid arthritis, osteoporosis, GERD. Recently evaluated by cardiology for anginal chest pain. Coronary CT angiogram confirmed obstructive coronary disease. Patient was scheduled for coronary angiogram. Procedure was done on 3/23, noted severe tortuosity and calcification of RCA, mid RCA stent was placed, unfortunately, coronary perforation of mid RCA. Patient decompensated, patient was intubated, PEA arrest, brief CPR , 5 min approx, pericardial tamponade, pericardiocentesis was done. New RCA stent was placed with cessation of bleeding. IABP was placed. CV surgery consulted. Emergent CABG x 1 with RSVG to RPDA, repair of bleeding sites at right atrial- RCA junction.  ml. Admitted to ICU.  Transferred out of ICU 3/25/23.    Acute hypoxic respiratory failure:  s/p intubation 3/23.  Extubated 3/24. On room air.Chest tubes out.  -bronchial hygiene    S/p emergent CABG x 1:  RSVG to RPDA, repair of bleeding sites at right atrial- RCA junction on 3/23.  S/p IABP 3/23.  -metoprolol 25mg BID  -asa 81 mg every day  -lipitor 80mg every day  -lasix 40mg IV q12  -per CVS    PEA cardiac arrest: secondary to pericardial tamponade 3/23.  Brief CPR, s/p drainage of pericardial effusion 350ml on 3/23/23.   -per CVS    CAD: anginal symptoms, s/p coronary angiogram, procedure complicated with RCA perforation 3/23 resultant in emergent CABG x1.    Fever: resolved. Reactive. Leukocytosis resolved. CXR without infiltrates. tracheal culture NGTD, MRSA nasal screen negative,  and U/A no e/o infection.     ABLA: Hgb 9.2 (3/24) ->7.2 this morning.   -repeat Hgb STAT and if correct will contact CV surgery    Acute thrombocytopenia:  plt 152 (3/23) -> 133 (3/24) -> 99 today  -cbc a.m.  -hold heparin if < 70k    Prolonged QTc: ECG (3/24/23) 497 ->340  "    Rheumatoid arthritis  -Plaquenil and sulfasalazine to be resumed in 2 weeks per CVS    GERD  -PPI    Hypokalemia: replaced    Hypophosphatemia:  -replaced        Diet: Regular Diet Adult    DVT Prophylaxis: heparin 5K q8  Case Catheter: Not present  Lines: None     Cardiac Monitoring: ACTIVE order. Indication: Post- PCI/Angiogram (24 hours)  Code Status: Full Code      Clinically Significant Risk Factors        # Hypokalemia: Lowest K = 3.3 mmol/L in last 2 days, will replace as needed         # Thrombocytopenia: Lowest platelets = 99 in last 2 days, will monitor for bleeding         # Overweight: Estimated body mass index is 27.61 kg/m  as calculated from the following:    Height as of this encounter: 1.727 m (5' 8\").    Weight as of this encounter: 82.4 kg (181 lb 9.6 oz)., PRESENT ON ADMISSION         Disposition Plan   Per CV surgery            Morgan Guerin DO, DO  Hospitalist Service  [facility]  Securely message with PostedIn (more info)  Text page via [employer] Paging/Directory   ______________________________________________________________________    Interval History   Afebrile. No acute events overnight.    Physical Exam   Vital Signs: Temp: 98  F (36.7  C) Temp src: Oral BP: 125/59 Pulse: 99   Resp: 18 SpO2: 95 % O2 Device: None (Room air) Oxygen Delivery: 2 LPM  Weight: 181 lbs 9.6 oz  gen nad  cv rrr, prominent right carotid pulsation w/o bruit. No rub. Sternal incision c/d/i  Lungs decreased bases, non labored  Neuro a&o  Abd:  Bs+, nd, some llq bruising, no cva tenderness bilat, left femoral arterial groin sit w/o bleeding, no bruit  Ski lle incisions c/d/i,     Lab/imaging reviewed  "

## 2023-03-26 NOTE — PROGRESS NOTES
Care Management Follow Up    Length of Stay (days): 3    Expected Discharge Date: 03/29/2023     Concerns to be Addressed:  IV lasix  Patient plan of care discussed at interdisciplinary rounds: Yes    Anticipated Discharge Disposition:  Home      Anticipated Discharge Services:   none  Anticipated Discharge DME:  unknown    Patient/family educated on Medicare website which has current facility and service quality ratings:  NA  Education Provided on the Discharge Plan:  yes  Patient/Family in Agreement with the Plan:  yes    Referrals Placed by CM/SW:  NA  Private pay costs discussed: NA    Additional Information:  Pt is a 72 year old  female with a past medical history of Lumbar radiculopathy, GERD, Chronic pain, Raynaud's disease, unstable angina pectoris, CAD. She was admitted on 03/323/23 for  A CABG.     She does not have a health care directive and does not have a designated health care agent.  Her spouse Morgan is her primary contact.    Pt was transferred form the ICU the evening on 03/25/23. She had suffered a PEA arrest in the ICU, but has not suffered any lasting effects.  PT plans to go home with her  on discharge.  OT has signed off. Family will provide transportation.  CM will continue to follow for any needs.     Pretty Watkins RN

## 2023-03-27 ENCOUNTER — APPOINTMENT (OUTPATIENT)
Dept: OCCUPATIONAL THERAPY | Facility: HOSPITAL | Age: 73
DRG: 907 | End: 2023-03-27
Attending: INTERNAL MEDICINE
Payer: MEDICARE

## 2023-03-27 VITALS
WEIGHT: 180.2 LBS | BODY MASS INDEX: 27.31 KG/M2 | SYSTOLIC BLOOD PRESSURE: 117 MMHG | HEART RATE: 86 BPM | HEIGHT: 68 IN | DIASTOLIC BLOOD PRESSURE: 58 MMHG | TEMPERATURE: 98.5 F | OXYGEN SATURATION: 93 % | RESPIRATION RATE: 20 BRPM

## 2023-03-27 DIAGNOSIS — Z95.1 S/P CABG X 1: Primary | ICD-10-CM

## 2023-03-27 LAB
CALCIUM, IONIZED MEASURED: 1.11 MMOL/L (ref 1.11–1.3)
CREAT SERPL-MCNC: 0.83 MG/DL (ref 0.51–0.95)
ERYTHROCYTE [DISTWIDTH] IN BLOOD BY AUTOMATED COUNT: 12.7 % (ref 10–15)
GFR SERPL CREATININE-BSD FRML MDRD: 75 ML/MIN/1.73M2
HCT VFR BLD AUTO: 24.4 % (ref 35–47)
HGB BLD-MCNC: 7.7 G/DL (ref 11.7–15.7)
ION CA PH 7.4: 1.13 MMOL/L (ref 1.11–1.3)
MAGNESIUM SERPL-MCNC: 2 MG/DL (ref 1.7–2.3)
MCH RBC QN AUTO: 32.2 PG (ref 26.5–33)
MCHC RBC AUTO-ENTMCNC: 31.6 G/DL (ref 31.5–36.5)
MCV RBC AUTO: 102 FL (ref 78–100)
PH: 7.44 (ref 7.35–7.45)
PHOSPHATE SERPL-MCNC: 4 MG/DL (ref 2.5–4.5)
PLATELET # BLD AUTO: 135 10E3/UL (ref 150–450)
POTASSIUM SERPL-SCNC: 3.6 MMOL/L (ref 3.4–5.3)
RBC # BLD AUTO: 2.39 10E6/UL (ref 3.8–5.2)
WBC # BLD AUTO: 5.8 10E3/UL (ref 4–11)

## 2023-03-27 PROCEDURE — 97535 SELF CARE MNGMENT TRAINING: CPT | Mod: GO

## 2023-03-27 PROCEDURE — 258N000003 HC RX IP 258 OP 636: Performed by: PHYSICIAN ASSISTANT

## 2023-03-27 PROCEDURE — 84100 ASSAY OF PHOSPHORUS: CPT | Performed by: PHYSICIAN ASSISTANT

## 2023-03-27 PROCEDURE — 83735 ASSAY OF MAGNESIUM: CPT | Performed by: INTERNAL MEDICINE

## 2023-03-27 PROCEDURE — 999N000157 HC STATISTIC RCP TIME EA 10 MIN

## 2023-03-27 PROCEDURE — 85014 HEMATOCRIT: CPT | Performed by: INTERNAL MEDICINE

## 2023-03-27 PROCEDURE — 82330 ASSAY OF CALCIUM: CPT | Performed by: PHYSICIAN ASSISTANT

## 2023-03-27 PROCEDURE — 99207 PR NO BILLABLE SERVICE THIS VISIT: CPT | Performed by: INTERNAL MEDICINE

## 2023-03-27 PROCEDURE — 84132 ASSAY OF SERUM POTASSIUM: CPT | Performed by: INTERNAL MEDICINE

## 2023-03-27 PROCEDURE — 250N000011 HC RX IP 250 OP 636: Performed by: PHYSICIAN ASSISTANT

## 2023-03-27 PROCEDURE — 97110 THERAPEUTIC EXERCISES: CPT | Mod: GO

## 2023-03-27 PROCEDURE — 82565 ASSAY OF CREATININE: CPT | Performed by: INTERNAL MEDICINE

## 2023-03-27 PROCEDURE — 250N000009 HC RX 250: Performed by: PHYSICIAN ASSISTANT

## 2023-03-27 PROCEDURE — 36415 COLL VENOUS BLD VENIPUNCTURE: CPT | Performed by: INTERNAL MEDICINE

## 2023-03-27 PROCEDURE — 250N000013 HC RX MED GY IP 250 OP 250 PS 637: Performed by: PHYSICIAN ASSISTANT

## 2023-03-27 RX ORDER — FUROSEMIDE 10 MG/ML
40 INJECTION INTRAMUSCULAR; INTRAVENOUS DAILY
Status: DISCONTINUED | OUTPATIENT
Start: 2023-03-27 | End: 2023-03-27 | Stop reason: HOSPADM

## 2023-03-27 RX ORDER — POTASSIUM CHLORIDE 1500 MG/1
20 TABLET, EXTENDED RELEASE ORAL ONCE
Status: COMPLETED | OUTPATIENT
Start: 2023-03-27 | End: 2023-03-27

## 2023-03-27 RX ORDER — MAGNESIUM OXIDE 400 MG/1
400 TABLET ORAL EVERY 4 HOURS
Status: COMPLETED | OUTPATIENT
Start: 2023-03-27 | End: 2023-03-27

## 2023-03-27 RX ORDER — METOPROLOL TARTRATE 25 MG/1
25 TABLET, FILM COATED ORAL 2 TIMES DAILY
Qty: 180 TABLET | Refills: 0 | Status: SHIPPED | OUTPATIENT
Start: 2023-03-27 | End: 2023-06-02

## 2023-03-27 RX ORDER — ACETAMINOPHEN 325 MG/1
975 TABLET ORAL EVERY 8 HOURS PRN
Qty: 60 TABLET | Refills: 0 | Status: SHIPPED | OUTPATIENT
Start: 2023-03-27

## 2023-03-27 RX ORDER — POTASSIUM CHLORIDE 750 MG/1
10 TABLET, EXTENDED RELEASE ORAL DAILY
Qty: 3 TABLET | Refills: 0 | Status: SHIPPED | OUTPATIENT
Start: 2023-03-27 | End: 2023-03-30

## 2023-03-27 RX ORDER — ATORVASTATIN CALCIUM 80 MG/1
80 TABLET, FILM COATED ORAL DAILY
Qty: 90 TABLET | Refills: 3 | Status: SHIPPED | OUTPATIENT
Start: 2023-03-27 | End: 2023-06-02

## 2023-03-27 RX ORDER — CLOPIDOGREL BISULFATE 75 MG/1
75 TABLET ORAL DAILY
Qty: 90 TABLET | Refills: 3 | Status: SHIPPED | OUTPATIENT
Start: 2023-03-27 | End: 2023-06-02

## 2023-03-27 RX ORDER — FUROSEMIDE 20 MG
20 TABLET ORAL 2 TIMES DAILY
Qty: 10 TABLET | Refills: 0 | Status: SHIPPED | OUTPATIENT
Start: 2023-03-27 | End: 2023-06-29

## 2023-03-27 RX ORDER — AMOXICILLIN 250 MG
1 CAPSULE ORAL 2 TIMES DAILY PRN
COMMUNITY
Start: 2023-03-27 | End: 2023-06-29

## 2023-03-27 RX ADMIN — PANTOPRAZOLE SODIUM 40 MG: 40 TABLET, DELAYED RELEASE ORAL at 08:34

## 2023-03-27 RX ADMIN — POTASSIUM CHLORIDE 20 MEQ: 1500 TABLET, EXTENDED RELEASE ORAL at 08:35

## 2023-03-27 RX ADMIN — SODIUM PHOSPHATE, MONOBASIC, MONOHYDRATE AND SODIUM PHOSPHATE, DIBASIC, ANHYDROUS 15 MMOL: 142; 276 INJECTION, SOLUTION INTRAVENOUS at 00:15

## 2023-03-27 RX ADMIN — MAGNESIUM OXIDE TAB 400 MG (241.3 MG ELEMENTAL MG) 400 MG: 400 (241.3 MG) TAB at 13:17

## 2023-03-27 RX ADMIN — ATORVASTATIN CALCIUM 80 MG: 40 TABLET, FILM COATED ORAL at 08:35

## 2023-03-27 RX ADMIN — MAGNESIUM OXIDE TAB 400 MG (241.3 MG ELEMENTAL MG) 400 MG: 400 (241.3 MG) TAB at 08:35

## 2023-03-27 RX ADMIN — ASPIRIN 81 MG: 81 TABLET, COATED ORAL at 08:34

## 2023-03-27 RX ADMIN — SENNOSIDES AND DOCUSATE SODIUM 1 TABLET: 50; 8.6 TABLET ORAL at 08:35

## 2023-03-27 RX ADMIN — HEPARIN SODIUM 5000 UNITS: 5000 INJECTION, SOLUTION INTRAVENOUS; SUBCUTANEOUS at 04:21

## 2023-03-27 RX ADMIN — POLYETHYLENE GLYCOL 3350 17 G: 17 POWDER, FOR SOLUTION ORAL at 08:36

## 2023-03-27 ASSESSMENT — ACTIVITIES OF DAILY LIVING (ADL)
ADLS_ACUITY_SCORE: 24

## 2023-03-27 NOTE — PROGRESS NOTES
Patient discharged home by CV surgery.  [facility] has nothing further to add. Will s/o.

## 2023-03-27 NOTE — DISCHARGE SUMMARY
Cardiovascular Surgery Discharge Summary    Primary Care Physician:  Sarah Beth Carvalho  Discharge Provider: Maria M Tam PA-C  Admission Date: 3/23/2023  Admission Diagnoses: Abnormal cardiovascular stress test [R94.39]  Positive cardiac stress test [R94.39]  Unstable angina pectoris (H) [I20.0]  Coronary artery disease involving native heart, unspecified vessel or lesion type, unspecified whether angina present [I25.10]  Percutaneous transluminal coronary angioplasty status [Z98.61]  Ruptured, coronary artery (H) [I21.9]  Discharge Date: 3/27/2023  Disposition: Home  Condition at Discharge: Good  Code Status: Full Code     Principal Diagnosis:   Coronary artery disease involving native heart, unspecified vessel or lesion type, unspecified whether angina present s/p CABGx1    Discharge Diagnoses:    Active Problems:      Patient Active Problem List   Diagnosis     Lupus Anticoagulant     Esophageal Reflux     Lumbar Disc Degeneration     Rheumatoid arthritis (H)     Hypercholesterolemia     Family history of breast cancer     Lumbar radiculopathy     Chronic pain     Bleeding duodenal ulcer     Oral lichen planus     Chronic low back pain without sciatica     Complete tear of left rotator cuff     Raynaud's disease without gangrene     Lichen sclerosus of female genitalia     Healthcare maintenance     Coronary artery disease involving native heart without angina pectoris, unspecified vessel or lesion type     Unstable angina pectoris (H)     Abnormal cardiovascular stress test     Positive cardiac stress test     Coronary artery disease involving native heart, unspecified vessel or lesion type, unspecified whether angina present     Percutaneous transluminal coronary angioplasty status     Ruptured, coronary artery (H)         Consult/s: Dietary, critical care medicine    Surgery:   3/23/2023 with Dr. Zamorano  1.  Median sternotomy.  2.  Evacuation of a bloody pericardial effusion.  3.  Endoscopic greater  saphenous vein procurement from the left thigh.  4.  Epiaortic ultrasound of the ascending aorta.  5.  Placement on central cardiopulmonary bypass.  6.  Emergency single vessel coronary artery bypass grafting procedure; reversed saphenous vein graft to the right posterior descending coronary artery.  7.  Placement of a temporary ventricular pacing wire.  8.  Control of bleeding sites x 2 involving the proximal right coronary artery and the right atrium.      Discharge Medications:      Review of your medicines      START taking      Dose / Directions   acetaminophen 325 MG tablet  Commonly known as: TYLENOL  Used for: S/P CABG (coronary artery bypass graft)      Dose: 975 mg  Take 3 tablets (975 mg) by mouth every 8 hours as needed for mild pain  Quantity: 60 tablet  Refills: 0     clopidogrel 75 MG tablet  Commonly known as: PLAVIX  Used for: S/P CABG (coronary artery bypass graft)      Dose: 75 mg  Take 1 tablet (75 mg) by mouth daily  Quantity: 90 tablet  Refills: 3     metoprolol tartrate 25 MG tablet  Commonly known as: LOPRESSOR  Used for: S/P CABG (coronary artery bypass graft)      Dose: 25 mg  Take 1 tablet (25 mg) by mouth 2 times daily  Quantity: 180 tablet  Refills: 0     senna-docusate 8.6-50 MG tablet  Commonly known as: SENOKOT-S/PERICOLACE  Used for: S/P CABG (coronary artery bypass graft)      Dose: 1 tablet  Take 1 tablet by mouth 2 times daily as needed for constipation  Refills: 0        CONTINUE these medicines which may have CHANGED, or have new prescriptions. If we are uncertain of the size of tablets/capsules you have at home, strength may be listed as something that might have changed.      Dose / Directions   * aspirin 81 MG chewable tablet  Commonly known as: ASA  This may have changed: You were already taking a medication with the same name, and this prescription was added. Make sure you understand how and when to take each.      Dose: 81 mg  Take 1 tablet (81 mg) by mouth daily Starting  tomorrow.  Quantity: 30 tablet  Refills: 3     * aspirin 81 MG EC tablet  This may have changed: Another medication with the same name was added. Make sure you understand how and when to take each.      Dose: 81 mg  Take 81 mg by mouth daily  Refills: 0     atorvastatin 80 MG tablet  Commonly known as: LIPITOR  This may have changed:     medication strength    how much to take  Used for: S/P CABG (coronary artery bypass graft)      Dose: 80 mg  Take 1 tablet (80 mg) by mouth daily  Quantity: 90 tablet  Refills: 3     diclofenac sodium 3 % Gel  This may have changed: See the new instructions.  Used for: Pain of finger of left hand      [DICLOFENAC SODIUM 3 % GEL] 4 g knees three times aday  and 2 g on hands or lebows three times a day  Quantity: 3 Tube  Refills: 3     estradiol 0.1 MG/GM vaginal cream  Commonly known as: ESTRACE  This may have changed: when to take this  Used for: Vaginal atrophy      Dose: 0.5 g  Place 0.5 g vaginally daily  Quantity: 42.5 g  Refills: 11         * This list has 2 medication(s) that are the same as other medications prescribed for you. Read the directions carefully, and ask your doctor or other care provider to review them with you.            CONTINUE these medicines which have NOT CHANGED      Dose / Directions   celecoxib 200 MG capsule  Commonly known as: celeBREX  Used for: Chronic midline low back pain without sciatica      Dose: 200 mg  Take 1 capsule (200 mg) by mouth 2 times daily  Quantity: 180 capsule  Refills: 3     hydroxychloroquine 200 MG tablet  Commonly known as: PLAQUENIL      Dose: 400 mg  [HYDROXYCHLOROQUINE (PLAQUENIL) 200 MG TABLET] Take 400 mg by mouth daily.  Refills: 0     lactobacillus rhamnosus (GG) 10 B CELL capsule  Commonly known as: CULTURELLE      Dose: 1 capsule  [LACTOBACILLUS RHAMNOSUS GG (CULTURELLE) 10-15 BILLION CELL CAPSULE] Take 1 capsule by mouth daily.  Refills: 0     nitroGLYcerin 0.4 MG sublingual tablet  Commonly known as: NITROSTAT  Used  for: Angina at rest (H)      For chest pain place 1 tablet under the tongue every 5 minutes for 3 doses. If symptoms persist 5 minutes after 1st dose call 911.  Quantity: 30 tablet  Refills: 1     omeprazole 40 MG DR capsule  Commonly known as: priLOSEC  Used for: Atypical chest pain, Abnormal stress test      Dose: 40 mg  Take 1 capsule (40 mg) by mouth daily  Quantity: 90 capsule  Refills: 3     SIMPONI ARIA IV  Used for: Medicare annual wellness visit, initial, Need for immunization against influenza, Rheumatoid arthritis (H), Chronic low back pain without sciatica, Hypercholesterolemia, Oral lichen planus, Healthcare maintenance, Urine abnormality      [GOLIMUMAB (SIMPONI ARIA IV)] Infuse into a venous catheter every 2 (two) months.  Refills: 0     sulfaSALAzine  MG EC tablet  Commonly known as: AZULFIDINE EN      Dose: 1,000 mg  1,000 mg 2 times daily  Refills: 0     THERAPEUTIC MULTIVITAMIN PO      Dose: 1 tablet  [MULTIVITAMIN THERAPEUTIC (THERAGRAN) TABLET] Take 1 tablet by mouth daily.  Refills: 0        STOP taking    isosorbide mononitrate 30 MG 24 hr tablet  Commonly known as: IMDUR              Where to get your medicines      These medications were sent to [facility] [address] [address]    Phone: [phone]     acetaminophen 325 MG tablet    aspirin 81 MG chewable tablet    atorvastatin 80 MG tablet    clopidogrel 75 MG tablet    metoprolol tartrate 25 MG tablet     Some of these will need a paper prescription and others can be bought over the counter. Ask your nurse if you have questions.    You don't need a prescription for these medications    senna-docusate 8.6-50 MG tablet         Discharge Instructions:    Follow up appointment with Primary Care Physician: Sarah Beth Carvalho within 7 days of discharge  Follow up appointment with Specialist:    Follow with CV Surgery as scheduled.   Follow-up with  "cardiology as scheduled    Diet: Cardiac    Activity/Restrictions: As tolerated with sternal precautions in mind (see below). No driving for 4 weeks or while on pain medication.     - Shower and wash your incisions daily with soap and water. No tub baths/hot tubs for 4 weeks. An antibacterial soap such as Dial or Safeguard is recommended.    - Check your incisions every day. If you notice any redness, drainage, or anything unusual, please call the surgeons office.    - No driving for 4 weeks after surgery or while on pain medication     - Do not lift anything more than 10 pounds for 6 weeks after surgery. After 6 weeks, advance lifting is tolerated.    - You may have watery drainage from your chest tube site for 2-3 weeks after surgery. Your may cover with a Band-Aid to protect your clothing. Remove the Band-Aid every day and wash the site.    - If you have a leg lesion, you may have swelling for 2-3 months. Elevate your leg any time you are not walking.    - If you feel any \"popping\" or \"clicking\" sensations in your chest, your arms are out too far or you are putting too much weight into arm movements. Do not reach over your head or out to the side to pull something. Do not do any arm exercises or use any exercise equipment that involves arm movement. If you feel your sternum moving, call the surgeon's office.    - Increase your daily activity as explained by Cardiac Rehab. You are encouraged to enroll in an Outpatient Cardiac Rehab Program.    - No active sports using your upper arms for 3 months. This includes fishing, hunting, bowling, swimming, tennis or golf.    - No physical activity such as cutting the grass, raking, vacuuming, changing sheets on your bed, snow shoveling, or using a  for 3 months.    - Use incentive spirometer 6-8 times per day for 2 weeks.       Hospital Summary:   Antonia Everett is a 72 year old female who was admitted to [facility] on 3/23/2023 for an outpatient " coronary angiogram. During the procedure,severe tortuosity and calcification of RCA were noted, mid RCA stent was placed which unfortunately caused coronary perforation of mid RCA. Patient decompensated, intubated. Had PEA arrest, brief CPR (approx 5 min), pericardial tamponade, pericardiocentesis was done. New RCA stent was placed with cessation of bleeding, although artery still with significant narrowing. IABP was placed. CV surgery consulted for emergent CABGx1.    Patient was deemed a candidate for coronary artery bypass surgery, and was taken to the operating room emergently on 02/23/2023 where patient underwent single vessel coronary artery bypass and endoscopic vein harvest from the left leg. Surgery was uneventful and patient was brought to the ICU post-operatively. IABP was removed POD#1 and she was subsequently extubated and weaned from pressors. Patient was awake and alert, afebrile, and with stable vitals. Insulin drip was discontinued and she was transitioned to a sliding scale. She was transferred to general telemetry status on POD#2 where patient has had return of bowel function, is maintaining oxygen saturations on room air, had her chest tubes removed, and has no complaints of chest pain or shortness of breath. On 03/27/23, patient was stable enough to be discharged to home.    Of note, patient will be maintained on Plavix for one year due to RCA stent. When Plavix is stopped, ASA should be increased to 162mg daily indefinitely s/p CABG.    Also, patient aware to wait 2 weeks from surgery for oral RA/lupus meds and one month for injectables. OK to restart PRN celebrex.     Patient has mild bilateral lower extremity edema and is slightly weight up at discharge; due to this she will be sent with 3 days of Lasix therapy with potassium supplementation.       Vital signs:  Temp: 97.8  F (36.6  C) Temp src: Oral BP: 103/57 Pulse: 80   Resp: 18 SpO2: 90 % O2 Device: None (Room air) Oxygen Delivery: 2 LPM  "Height: 172.7 cm (5' 8\") Weight: 81.7 kg (180 lb 3.2 oz)  Estimated body mass index is 27.4 kg/m  as calculated from the following:    Height as of this encounter: 1.727 m (5' 8\").    Weight as of this encounter: 81.7 kg (180 lb 3.2 oz).          Physical Exam:    Pertinent exam findings on day of discharge include:  Gen: Seen up in chair. NAD. Pleasant and conversant.   CV: RRR on monitor. Mild bilateral lower extremity edema.  Pulm: Non-labored breathing on room air.  Abd: Soft, non-tender, non-distended  Neuro: CNs grossly intact  Inc: C/D/I    _______  Maria M Tam PA-C  Cardiothoracic Surgery  [phone]        "

## 2023-03-27 NOTE — PLAN OF CARE
Goal Outcome Evaluation:                  sc  Patient Name: Antonia Everett   MRN: [id number]   Date of Admission: 3/23/2023    Procedure: Procedure(s):  CORONARY ARTERY BYPASS GRAFT X1 (CABG), EPIAORTIC ULTRASOUND, ENDOVEIN HARVEST FROM LEFT LOWER EXTREMITY    Post Op day #:4    Subjective (Patient focus/Primary Problem for shift): sleep          Pain Goal 0 Pain Rating 0           Pain Medication/ Regime effective to reduce patient pain scheduled tylenol, though patient declined to take    Objective (Physical assessment):           Rhythm: normal sinus rhythm            Bowel Activity: yes if Yes indicate when: 3/26          Bowel Medications: yes            Incision: healing well          Incentive Spirometry Q 1-2 hour when awake:  yes Volume: 1400          Epicardial Pacing Wires:  no            Patient Activity:           Up to chair for meals: yes          Ambulation with RN x2 (Not including CR): not applicable            Is patient in home clothes:no             Chest Tubes   Pleural: no Draining: not applicable               Suction: not applicable              Mediastinal: no Draining: not applicable               Suction: not applicable   Dressing Change Daily:not applicable If No, why?open to air                     Urinary Catheter: no           Preventative WOC consult (need MD order): no       Assessment (Nursing primary shift focus): Patient is alert and oriented.  She denied pain and declined to take tylenol.  Able to walk to restroom with staff standing by for safety.  Following sternal precautions.  Had bowel movement previous day.  Able to void without issue.      Plan (Patient Care Plan/focus): Follow up with electrolyte protocols in am.  Sit up in recliner for breakfast.       Raquel Torres RN   3/27/2023   4:57 AM

## 2023-03-27 NOTE — PROGRESS NOTES
Care Management Discharge Note    Discharge Date: 03/27/2023       Discharge Disposition: Home    Discharge Services:  Outpatient Rehab (PT, OT, SLP, Cardiac or Pulmonary)    Discharge DME:      Discharge Transportation: family or friend will provide    Private pay costs discussed: Not applicable    Education Provided on the Discharge Plan:    Persons Notified of Discharge Plans: MD has placed orders  Patient/Family in Agreement with the Plan: yes    Handoff Referral Completed: Yes    Additional Information:    Plan for patient to return home with OP CR.     Spouse to transport.     No CM needs identified.       GASPER Kruger

## 2023-03-27 NOTE — PLAN OF CARE
Occupational Therapy Discharge Summary    Reason for therapy discharge:    Discharged to home with outpatient therapy.    Progress towards therapy goal(s). See goals on Care Plan in [facility] electronic health record for goal details.  Goals met    Therapy recommendation(s):    Continued therapy is recommended.  Rationale/Recommendations:  D/c home w/ outpatient cardiac rehab .    Goal Outcome Evaluation:

## 2023-03-27 NOTE — PROGRESS NOTES
Patient on RA. Follow up with pulmonary toiletries. Patient did well with flutter valve and IS. IS achieved 1500. Patient tolerated well.           Rory Goldstein, RT

## 2023-03-27 NOTE — PROGRESS NOTES
Pt tolerated flutter valve and volume expansion achieving 1400 ml of IS. Deep breath and coughing encouraged. Pt remains on room air. RT following.    Anjum Subramanian, RT

## 2023-03-30 ENCOUNTER — OFFICE VISIT (OUTPATIENT)
Dept: INTERNAL MEDICINE | Facility: CLINIC | Age: 73
End: 2023-03-30
Payer: MEDICARE

## 2023-03-30 VITALS
RESPIRATION RATE: 9 BRPM | DIASTOLIC BLOOD PRESSURE: 64 MMHG | BODY MASS INDEX: 25.98 KG/M2 | TEMPERATURE: 97.9 F | WEIGHT: 175.4 LBS | HEART RATE: 68 BPM | SYSTOLIC BLOOD PRESSURE: 108 MMHG | OXYGEN SATURATION: 79 % | HEIGHT: 69 IN

## 2023-03-30 DIAGNOSIS — D64.9 ANEMIA, UNSPECIFIED TYPE: ICD-10-CM

## 2023-03-30 DIAGNOSIS — Z95.1 S/P CABG (CORONARY ARTERY BYPASS GRAFT): Primary | ICD-10-CM

## 2023-03-30 PROCEDURE — 99214 OFFICE O/P EST MOD 30 MIN: CPT | Performed by: INTERNAL MEDICINE

## 2023-03-30 RX ORDER — HYDROCODONE BITARTRATE AND ACETAMINOPHEN 5; 325 MG/1; MG/1
1 TABLET ORAL EVERY 6 HOURS PRN
Qty: 30 TABLET | Refills: 0 | Status: SHIPPED | OUTPATIENT
Start: 2023-03-30 | End: 2023-04-18

## 2023-03-30 NOTE — PROGRESS NOTES
"  Assessment & Plan     S/P CABG (coronary artery bypass graft)  Sternotomy wound is healing really well.  There is no signs of infection.  She has 2 incisions in the upper abdomen.  One of them she can cover with Steri-Strips they are still bleeding a little bit but not infected.  She has no other complications still has a lot of postoperative pain.  She is using the incentive spirometer.  She is set up for cardiac rehab.  And postop cardiology visit.  Blood pressure is slightly low normal but will continue beta-blocker.  She is not taking the nitrates which is appropriate she is taking aspirin and Plavix.  And high-dose Lipitor.  She has constant still postoperative chest pain.  Some of the pain is very similar to her prior pain that she used to have.  But it is not anginal in nature.  She is overall got a lot of support and is coping well.  - HYDROcodone-acetaminophen (NORCO) 5-325 MG tablet; Take 1 tablet by mouth every 6 hours as needed for severe pain (7-10)    Anemia, unspecified type  Sharp drop in hemoglobin to 7 platelets also dropped.  Would advise holding the Celebrex until her hemoglobin and platelet counts normalized.  To reduce her risk of bleeding complications recommend hydrocodone for pain relief.  - CBC with platelets; Future  - Iron and iron binding capacity; Future  - Comprehensive metabolic panel; Future  Repeat blood tests can be done in 3 days she just had her blood work done 2 days ago.  No other complications like renal dysfunction only anemia.  Overall she has done a remarkable recovery.    Close follow-up return to clinic in 6 weeks.     MED REC REQUIRED  Post Medication Reconciliation Status: discharge medications reconciled and changed, per note/orders  BMI:   Estimated body mass index is 26.28 kg/m  as calculated from the following:    Height as of this encounter: 1.74 m (5' 8.5\").    Weight as of this encounter: 79.6 kg (175 lb 6.4 oz).           Sarah Beth Carvalho MD  [facility] " CLINIC [facility]    Sarai James is a 72 year old, presenting for the following health issues:  Recheck Medication and Hospital F/U  Very pleasant patient accompanied by her .  She had anginal symptoms found to have a abnormal stress test.  Moderate coronary artery stenosis in the CT angiogram.  Cardiology consultation was obtained due to unstable angina symptoms.  She was started on medical management with beta-blocker and nitrate and statin and aspirin with no improvement in her symptoms.  She was taken for an angiogram and found to have still significant stenosis of the right coronary artery.  Unfortunately while stent was being put in due to the tortuosity and severe calcification of the artery it to ruptured at the midpoint.  Resulting in pericardial tamponade and cardiac arrest.  She went underwent CPR for 5 to 8 minutes.  Pericardial cardiocentesis was done.  She was successfully resuscitated.  And intubated under right coronary artery was restented.  She was then taken for surgery for for an emergent single-vessel bypass using her saphenous vein.  She was given temporary pacing which was removed.  She had further evacuation of pericardial blood at the bypass.  That surgery went uneventfully.  Hemoglobin did drop.  She was discharged to home with no other postoperative complications like pleurisy.  She had been given Dilaudid in the hospital and developed some hallucinations with that.  Additional Questions 3/30/2023   Roomed by AW   Accompanied by SPOUSE     HPI   Current Outpatient Medications   Medication     acetaminophen (TYLENOL) 325 MG tablet     aspirin (ASA) 81 MG chewable tablet     atorvastatin (LIPITOR) 80 MG tablet     clopidogrel (PLAVIX) 75 MG tablet     diclofenac sodium 3 % Gel     estradiol (ESTRACE) 0.1 MG/GM vaginal cream     furosemide (LASIX) 20 MG tablet     GOLIMUMAB (SIMPONI ARIA IV)     HYDROcodone-acetaminophen (NORCO) 5-325 MG tablet     hydroxychloroquine  (PLAQUENIL) 200 mg tablet     Lactobacillus rhamnosus GG (CULTURELLE) 10-15 Billion cell capsule     metoprolol tartrate (LOPRESSOR) 25 MG tablet     multivitamin therapeutic (THERAGRAN) tablet     nitroGLYcerin (NITROSTAT) 0.4 MG sublingual tablet     omeprazole (PRILOSEC) 40 MG DR capsule     potassium chloride ER (KLOR-CON M) 10 MEQ CR tablet     senna-docusate (SENOKOT-S/PERICOLACE) 8.6-50 MG tablet     No current facility-administered medications for this visit.         Hospital Follow-up Visit:    Hospital/Nursing Home/IP Rehab Facility: [facility]  Date of Admission: 3/23/23  Date of Discharge: 3/27/23  Reason(s) for Admission: ANGIOGRAM    Was your hospitalization related to COVID-19? No   Problems taking medications regularly:  None  Medication changes since discharge: None  Problems adhering to non-medication therapy:  None    Summary of hospitalization:  [facility] discharge summary reviewed    Diagnostic Tests/Treatments reviewed.  Follow up needed: none  Other Healthcare Providers Involved in Patient s Care:         None  Update since discharge: stable.   Plan of care communicated with patient         Patient Active Problem List   Diagnosis     Lupus Anticoagulant     Esophageal Reflux     Lumbar Disc Degeneration     Rheumatoid arthritis (H)     Hypercholesterolemia     Family history of breast cancer     Lumbar radiculopathy     Chronic pain     Bleeding duodenal ulcer     Oral lichen planus     Chronic low back pain without sciatica     Complete tear of left rotator cuff     Raynaud's disease without gangrene     Lichen sclerosus of female genitalia     Healthcare maintenance     Coronary artery disease involving native heart without angina pectoris, unspecified vessel or lesion type     Unstable angina pectoris (H)     Abnormal cardiovascular stress test     Positive cardiac stress test     Coronary artery disease involving native heart, unspecified vessel or lesion type,  "unspecified whether angina present     Percutaneous transluminal coronary angioplasty status     Ruptured, coronary artery (H)     Current Outpatient Medications   Medication     acetaminophen (TYLENOL) 325 MG tablet     aspirin (ASA) 81 MG chewable tablet     atorvastatin (LIPITOR) 80 MG tablet     clopidogrel (PLAVIX) 75 MG tablet     diclofenac sodium 3 % Gel     estradiol (ESTRACE) 0.1 MG/GM vaginal cream     furosemide (LASIX) 20 MG tablet     GOLIMUMAB (SIMPONI ARIA IV)     HYDROcodone-acetaminophen (NORCO) 5-325 MG tablet     hydroxychloroquine (PLAQUENIL) 200 mg tablet     Lactobacillus rhamnosus GG (CULTURELLE) 10-15 Billion cell capsule     metoprolol tartrate (LOPRESSOR) 25 MG tablet     multivitamin therapeutic (THERAGRAN) tablet     nitroGLYcerin (NITROSTAT) 0.4 MG sublingual tablet     omeprazole (PRILOSEC) 40 MG DR capsule     potassium chloride ER (KLOR-CON M) 10 MEQ CR tablet     senna-docusate (SENOKOT-S/PERICOLACE) 8.6-50 MG tablet     No current facility-administered medications for this visit.           Review of Systems   Constitutional, HEENT, cardiovascular, pulmonary, gi and gu systems are negative, except as otherwise noted.      Objective    /64 (BP Location: Left arm, Patient Position: Sitting, Cuff Size: Adult Regular)   Pulse 68   Temp 97.9  F (36.6  C) (Tympanic)   Resp (!) 9   Ht 1.74 m (5' 8.5\")   Wt 79.6 kg (175 lb 6.4 oz)   SpO2 (!) 79%   BMI 26.28 kg/m    There is no height or weight on file to calculate BMI.  Physical Exam   GENERAL: healthy, alert and no distress  NECK: no adenopathy, no asymmetry, masses, or scars and thyroid normal to palpation  RESP: lungs clear to auscultation - no rales, rhonchi or wheezes  CV: regular rate and rhythm, normal S1 S2, no S3 or S4, no murmur, click or rub, no peripheral edema and peripheral pulses strong  ABDOMEN: soft, nontender, no hepatosplenomegaly, no masses and bowel sounds normal  MS: no gross musculoskeletal defects noted, " no edema  Chest shows a large midline long sternotomy scar.  With glue on it no stitches visible.  It is well healed with no dehiscence no cellulitis or tenderness.  She has 2 laparoscopic or chest tube scars in the upper abdomen that are bleeding slightly they were covered with Steri-Strips.  Calfs are supple there is no pedal edema.

## 2023-04-04 ENCOUNTER — LAB (OUTPATIENT)
Dept: LAB | Facility: CLINIC | Age: 73
End: 2023-04-04
Payer: MEDICARE

## 2023-04-04 ENCOUNTER — HOSPITAL ENCOUNTER (OUTPATIENT)
Dept: CARDIAC REHAB | Facility: CLINIC | Age: 73
Discharge: HOME | End: 2023-04-04
Attending: INTERNAL MEDICINE
Payer: MEDICARE

## 2023-04-04 DIAGNOSIS — Z95.1 S/P CABG X 1: ICD-10-CM

## 2023-04-04 DIAGNOSIS — D64.9 ANEMIA, UNSPECIFIED TYPE: ICD-10-CM

## 2023-04-04 LAB
ALBUMIN SERPL-MCNC: 3.9 G/DL (ref 3.5–5)
ALP SERPL-CCNC: 47 U/L (ref 45–120)
ALT SERPL W P-5'-P-CCNC: 17 U/L (ref 0–45)
ANION GAP SERPL CALCULATED.3IONS-SCNC: 8 MMOL/L (ref 5–18)
AST SERPL W P-5'-P-CCNC: 20 U/L (ref 0–40)
BILIRUB SERPL-MCNC: 0.4 MG/DL (ref 0–1)
BUN SERPL-MCNC: 22 MG/DL (ref 8–28)
CALCIUM SERPL-MCNC: 9.3 MG/DL (ref 8.5–10.5)
CHLORIDE BLD-SCNC: 108 MMOL/L (ref 98–107)
CO2 SERPL-SCNC: 23 MMOL/L (ref 22–31)
CREAT SERPL-MCNC: 1.07 MG/DL (ref 0.6–1.1)
ERYTHROCYTE [DISTWIDTH] IN BLOOD BY AUTOMATED COUNT: 13.5 % (ref 10–15)
GFR SERPL CREATININE-BSD FRML MDRD: 55 ML/MIN/1.73M2
GLUCOSE BLD-MCNC: 95 MG/DL (ref 70–125)
HCT VFR BLD AUTO: 29.5 % (ref 35–47)
HGB BLD-MCNC: 9.4 G/DL (ref 11.7–15.7)
IRON BINDING CAPACITY (ROCHE): 276 UG/DL (ref 240–430)
IRON SATN MFR SERPL: 14 % (ref 15–46)
IRON SERPL-MCNC: 38 UG/DL (ref 37–145)
MCH RBC QN AUTO: 32 PG (ref 26.5–33)
MCHC RBC AUTO-ENTMCNC: 31.9 G/DL (ref 31.5–36.5)
MCV RBC AUTO: 100 FL (ref 78–100)
PLATELET # BLD AUTO: 383 10E3/UL (ref 150–450)
POTASSIUM BLD-SCNC: 4.8 MMOL/L (ref 3.5–5)
PROT SERPL-MCNC: 6.9 G/DL (ref 6–8)
RBC # BLD AUTO: 2.94 10E6/UL (ref 3.8–5.2)
SODIUM SERPL-SCNC: 139 MMOL/L (ref 136–145)
WBC # BLD AUTO: 6.5 10E3/UL (ref 4–11)

## 2023-04-04 PROCEDURE — 93797 PHYS/QHP OP CAR RHAB WO ECG: CPT

## 2023-04-04 PROCEDURE — 83550 IRON BINDING TEST: CPT

## 2023-04-04 PROCEDURE — 36415 COLL VENOUS BLD VENIPUNCTURE: CPT

## 2023-04-04 PROCEDURE — 85027 COMPLETE CBC AUTOMATED: CPT

## 2023-04-04 PROCEDURE — 80053 COMPREHEN METABOLIC PANEL: CPT

## 2023-04-04 PROCEDURE — 93798 PHYS/QHP OP CAR RHAB W/ECG: CPT

## 2023-04-06 ENCOUNTER — HOSPITAL ENCOUNTER (OUTPATIENT)
Dept: CARDIAC REHAB | Facility: CLINIC | Age: 73
Discharge: HOME | End: 2023-04-06
Attending: INTERNAL MEDICINE
Payer: MEDICARE

## 2023-04-06 ENCOUNTER — TELEPHONE (OUTPATIENT)
Dept: CARDIOLOGY | Facility: CLINIC | Age: 73
End: 2023-04-06
Payer: MEDICARE

## 2023-04-06 PROCEDURE — 93798 PHYS/QHP OP CAR RHAB W/ECG: CPT

## 2023-04-06 NOTE — TELEPHONE ENCOUNTER
Health Call Center    Phone Message    May a detailed message be left on voicemail: yes     Reason for Call: Other: Patient had open heart surgery 3/23/23 and now she has some redness around the drain site, no other symptoms. Please call patient to discuss.      Action Taken: Other: cardiology    Travel Screening: Not Applicable   Thank you!  [facility]

## 2023-04-06 NOTE — TELEPHONE ENCOUNTER
Cardiovascular Surgery  [facility]    DISCHARGE FOLLOW UP PHONE CALL      POST OP MONITORING  How is your pain on a 0-10 scale, how are you managing your pain? Pain is being managed with oxycodone prescribed by her PCP.    ACTIVITY  How is your activity tolerance? Walking and doing well.  Are you still doing sternal precautions? Yes.  Do you hear any clicking when you are moving or taking a deep breath? No.    Are you weighing yourself daily? Yes, pt is 171 lbs.    SIGNS AND SYMPTOMS OF INFECTION  1. INCREASE IN PAIN - No  2. FEVER - No  3. DRAINAGE - No If so, color: NA  4. REDNESS - pt concerned about a chest tube site. Picture sent to CV RN email. Confirmed site looks ok, and is slightly reddened but has a large scab. Advised patient to continue keeping it clean and call with any concerns or changes.  5. SWELLING - No    ASSISTANCE  Do you have someone at home to assist you with your daily activities? Spouse    MEDICATIONS  Is someone helping you to set up your medications? Pt is independent.  Do you have any questions about your medications? No.    Are you on a blood thinner? Plavix.  Who is managing your INRs? NA    FOLLOW UP  You are scheduled to see your primary care physician on 3/30.  You are scheduled to see our surgery advanced practive provider for post operative follow up on 4/25.    You are scheduled for cardiac rehab on - already in schedule.  You are scheduled to see your cardiologist on 6/27.    CONTACT INFORMATION  Please feel free to call us with any questions or symptoms that are concerning for you at [phone]. If it is after 4:00 in the afternoon, or a weekend please call [phone] and ask for the on call specialist. We want to do everything we can to help prevent you needing to return to the ED, so please do not hesitate to call us.    Jaki Unger, RNCC  Cardiovascular Surgery  [phone]  April 6, 2023 11:49 AM

## 2023-04-11 ENCOUNTER — HOSPITAL ENCOUNTER (OUTPATIENT)
Dept: CARDIAC REHAB | Facility: CLINIC | Age: 73
Discharge: HOME | End: 2023-04-11
Attending: INTERNAL MEDICINE
Payer: MEDICARE

## 2023-04-11 PROCEDURE — 93798 PHYS/QHP OP CAR RHAB W/ECG: CPT

## 2023-04-12 ENCOUNTER — TELEPHONE (OUTPATIENT)
Dept: CARDIOLOGY | Facility: CLINIC | Age: 73
End: 2023-04-12
Payer: MEDICARE

## 2023-04-12 DIAGNOSIS — I25.10 CORONARY ARTERY DISEASE INVOLVING NATIVE HEART WITHOUT ANGINA PECTORIS, UNSPECIFIED VESSEL OR LESION TYPE: ICD-10-CM

## 2023-04-12 DIAGNOSIS — I20.0 UNSTABLE ANGINA PECTORIS (H): ICD-10-CM

## 2023-04-12 NOTE — TELEPHONE ENCOUNTER
[facility] Call [facility]    Phone Message    May a detailed message be left on voicemail: yes     Reason for Call: Medication Refill Request    Has the patient contacted the pharmacy for the refill? Yes   Name of medication being requested: aspirin (ASA) 81 MG chewable tablet [1922]  Provider who prescribed the medication: Dr. Powers  Pharmacy:    [id number] - [place], [place] - [address]  Date medication is needed: 4/14/2023        Action Taken: Other: Cardiology    Travel Screening: Not Applicable     Thank you!  [facility]        Previous entered prescription re-ordered, reduced # of refills by one, and moved prescription to [facility]. Cmm<Rn

## 2023-04-13 ENCOUNTER — HOSPITAL ENCOUNTER (OUTPATIENT)
Dept: CARDIAC REHAB | Facility: CLINIC | Age: 73
Discharge: HOME | End: 2023-04-13
Attending: INTERNAL MEDICINE
Payer: MEDICARE

## 2023-04-13 PROCEDURE — 93798 PHYS/QHP OP CAR RHAB W/ECG: CPT

## 2023-04-13 RX ORDER — ASPIRIN 81 MG/1
81 TABLET, CHEWABLE ORAL DAILY
Qty: 30 TABLET | Refills: 2 | Status: SHIPPED | OUTPATIENT
Start: 2023-04-13 | End: 2024-03-15

## 2023-04-17 ENCOUNTER — TELEPHONE (OUTPATIENT)
Dept: INTERNAL MEDICINE | Facility: CLINIC | Age: 73
End: 2023-04-17

## 2023-04-17 DIAGNOSIS — Z95.1 S/P CABG (CORONARY ARTERY BYPASS GRAFT): ICD-10-CM

## 2023-04-17 NOTE — TELEPHONE ENCOUNTER
Spoke with patient to gather more information. Patient states she is recovering well. States her pain is improving, but is still severe at night when she lays down. States this is when she takes the hydrocodone because if she doesn't take it she does not sleep well at all.

## 2023-04-17 NOTE — TELEPHONE ENCOUNTER
General Call      Reason for Call:  Pt stating was seen on 03/30/2023 with PCP and was given Rx for Hydrocodone, patient asking for a refill    Do not see this on her medication list    Please advise    What are your questions or concerns:  n/a    Date of last appointment with provider: na/    Could we send this information to you in [facility] or would you prefer to receive a phone call?:   Patient would prefer a phone call   Okay to leave a detailed message?: Yes at Cell number on file:    Telephone Information:   Mobile [phone]

## 2023-04-18 ENCOUNTER — HOSPITAL ENCOUNTER (OUTPATIENT)
Dept: CARDIAC REHAB | Facility: CLINIC | Age: 73
Discharge: HOME | End: 2023-04-18
Attending: INTERNAL MEDICINE
Payer: MEDICARE

## 2023-04-18 PROCEDURE — 93798 PHYS/QHP OP CAR RHAB W/ECG: CPT

## 2023-04-18 RX ORDER — HYDROCODONE BITARTRATE AND ACETAMINOPHEN 5; 325 MG/1; MG/1
1 TABLET ORAL EVERY 6 HOURS PRN
Qty: 30 TABLET | Refills: 0 | Status: SHIPPED | OUTPATIENT
Start: 2023-04-18 | End: 2023-06-29

## 2023-04-20 ENCOUNTER — HOSPITAL ENCOUNTER (OUTPATIENT)
Dept: CARDIAC REHAB | Facility: CLINIC | Age: 73
Discharge: HOME | End: 2023-04-20
Attending: INTERNAL MEDICINE
Payer: MEDICARE

## 2023-04-20 PROCEDURE — 93798 PHYS/QHP OP CAR RHAB W/ECG: CPT

## 2023-04-25 ENCOUNTER — HOSPITAL ENCOUNTER (OUTPATIENT)
Dept: CARDIAC REHAB | Facility: CLINIC | Age: 73
Discharge: HOME | End: 2023-04-25
Attending: INTERNAL MEDICINE
Payer: MEDICARE

## 2023-04-25 ENCOUNTER — OFFICE VISIT (OUTPATIENT)
Dept: CARDIOLOGY | Facility: CLINIC | Age: 73
End: 2023-04-25
Payer: MEDICARE

## 2023-04-25 VITALS
HEART RATE: 83 BPM | SYSTOLIC BLOOD PRESSURE: 124 MMHG | BODY MASS INDEX: 25.47 KG/M2 | RESPIRATION RATE: 16 BRPM | DIASTOLIC BLOOD PRESSURE: 64 MMHG | OXYGEN SATURATION: 98 % | WEIGHT: 170 LBS

## 2023-04-25 DIAGNOSIS — Z95.1 S/P CABG X 1: Primary | ICD-10-CM

## 2023-04-25 PROCEDURE — 93798 PHYS/QHP OP CAR RHAB W/ECG: CPT

## 2023-04-25 PROCEDURE — 99024 POSTOP FOLLOW-UP VISIT: CPT | Performed by: PHYSICIAN ASSISTANT

## 2023-04-25 NOTE — LETTER
4/25/2023    Sarah Beth Carvalho MD  [address] [facility] [address] [facility] [id number]    RE: Antonia Everett       Dear Colleague,     I had the pleasure of seeing Antonia Everett in the [facility].  CARDIOTHORACIC SURGERY FOLLOW-UP VISIT     Antonia Everett   1950   [id number]      Reason for visit: Post operative clinic visit. Patient underwent emergent coronary artery bypass grafting x1 with Dr. Zamorano on 3/23/23.    HPI: Antonia Everett is a 73 year old year old female seen in clinic for a routine follow-up appointment after surgery. Patient has past medical history as below. Hospital course was complicated by cardiac tamponade during angiogram, stent placement followed by emergent coronary artery bypass. Patient was discharged to home.    Patient has been doing well since discharge. Patient did follow-up with primary care since leaving the hospital. Reports that incisions are healing well. Denies fevers, peripheral edema. Appetite is improving and patient is voiding without difficulty. Weight has been stable. Pain management at this point with tylenol. Patient has been attending cardiac rehab and this is going well.       Of note, patient will be maintained on Plavix for one year due to RCA stent. When Plavix is stopped, ASA should be increased to 162mg daily indefinitely s/p CABG.    PAST MEDICAL HISTORY:  Past Medical History:   Diagnosis Date    Bleeding duodenal ulcer 2010    Chronic diarrhea 04/20/2017    Colonoscopy normal except inflammatory polyp or    Chronic left shoulder pain 9/26/2017    Chronic low back pain without sciatica 11/3/2017    Chronic pain     Chronic radicular low back pain     Pain clinic.  Nerve stimulator placement    Complete tear of left rotator cuff 4/3/2018    Duodenal ulcer     bleeding    Family history of breast cancer     GERD (gastroesophageal reflux disease)     H/O ulcer disease     secondary to medications    Herpes zoster 2010    History of transfusion      Hypercholesterolemia     LA (lupus anticoagulant) disorder (H)     Left hip pain 2015    bursitis, evaluated by orthopedics    Lichen sclerosus of female genitalia 6/16/2020    MRSA (methicillin resistant staph aureus) culture positive     betweeen 1996 and 2000    Oral lichen planus 4/20/2017    Plantar fasciitis     Raynaud's disease without gangrene 11/20/2018    Involving both feet, describing burning sensation, redness and coolness to touch    Rheumatoid arthritis (H)     Followed by rheumatology, Dr. Felipe,     Screening     DEXA normal T score -0.9 2016, minimal change compared to previous DEXA    VRE (vancomycin resistant enterococcus) culture positive     between 1996 and 2000       PAST SURGICAL HISTORY:  Past Surgical History:   Procedure Laterality Date    ABDOMINAL HERNIA REPAIR      APPENDECTOMY      ARTHROSCOPY SHOULDER ROTATOR CUFF REPAIR Right     ARTHROSCOPY SHOULDER ROTATOR CUFF REPAIR Left 04/2018    BACK SURGERY      BIOPSY BREAST Left     BYPASS GRAFT ARTERY CORONARY N/A 3/23/2023    Procedure: CORONARY ARTERY BYPASS GRAFT X1 (CABG), EPIAORTIC ULTRASOUND, ENDOVEIN HARVEST FROM LEFT LOWER EXTREMITY;  Surgeon: Yessenia Zamorano MD;  Location: [facility] [place] OR    COLONOSCOPY      Normal colonoscopy May 2017 except inflammatory polyp    CV CORONARY ANGIOGRAM N/A 3/23/2023    Procedure: Coronary Angiogram;  Surgeon: Marcella Powers MD;  Location: [facility] LAB CV    CV CORONARY LITHOTRIPSY PCI N/A 3/23/2023    Procedure: Percutaneous Coronary Intervention - Lithotripsy;  Surgeon: Marcella Powers MD;  Location: [facility] LAB CV    CV INTRA AORTIC BALLOON N/A 3/23/2023    Procedure: Intraprocedure Aortic Balloon Pump Insertion;  Surgeon: Marcella Powers MD;  Location: [facility] LAB CV    CV LEFT HEART CATH N/A 3/23/2023    Procedure: Left Heart Catheterization;  Surgeon: Marcella Powers MD;  Location: [facility] LAB CV    CV PCI STENT DRUG ELUTING N/A 3/23/2023    Procedure:  Percutaneous Coronary Intervention - Stent;  Surgeon: Marcella Powers MD;  Location: [facility] CV    CV PERICARDIOCENTESIS N/A 3/23/2023    Procedure: Pericardiocentesis;  Surgeon: Marcella Powers MD;  Location: [facility] CV    ESOPHAGOSCOPY, GASTROSCOPY, DUODENOSCOPY (EGD), COMBINED N/A 12/16/2018    Procedure: ESOPHAGOGASTRODUODENOSCOPY (EGD) with biopsy and foreign body removal;  Surgeon: Raymond Triana MD;  Location: [facility];  Service: Gastroenterology    HC REMOVAL OF TONSILS,<13 Y/O      Description: Tonsillectomy;  Recorded: 09/30/2011;    HYSTERECTOMY      LUIS ANGEL with BSO    HYSTERECTOMY TOTAL ABDOMINAL, BILATERAL SALPINGO-OOPHORECTOMY, COMBINED      KNEE SURGERY Right 2007    Arthroscopic knee surgery    OPEN REDUCTION INTERNAL FIXATION ELBOW  2014    [place] ARTHRODESIS ANT INTERBODY MIN DISCECTOMY,LUMBAR      Description: Lumbar Vertebral Fusion;  Recorded: 09/30/2011;  Comments: L5-S1 fusion    [place] STEREOTACT STIM SPINAL CORD      Description: Spinal Stereotaxis Stimulation Of Cord;  Recorded: 09/30/2011;  Comments: implanted 8/6/2007 with lead revision 2011    SKIN GRAFT  1996       CURRENT MEDICATIONS:     Current Outpatient Medications:     aspirin (ASA) 81 MG chewable tablet, Take 1 tablet (81 mg) by mouth daily Starting tomorrow., Disp: 30 tablet, Rfl: 2    atorvastatin (LIPITOR) 80 MG tablet, Take 1 tablet (80 mg) by mouth daily, Disp: 90 tablet, Rfl: 3    clopidogrel (PLAVIX) 75 MG tablet, Take 1 tablet (75 mg) by mouth daily, Disp: 90 tablet, Rfl: 3    estradiol (ESTRACE) 0.1 MG/GM vaginal cream, Place 0.5 g vaginally daily, Disp: 42.5 g, Rfl: 11    furosemide (LASIX) 20 MG tablet, Take 1 tablet (20 mg) by mouth 2 times daily For 3 days, then stop, Disp: 10 tablet, Rfl: 0    GOLIMUMAB (SIMPONI ARIA IV), [GOLIMUMAB (SIMPONI ARIA IV)] Infuse into a venous catheter every 2 (two) months. , Disp: , Rfl:     HYDROcodone-acetaminophen (NORCO) 5-325 MG tablet, Take 1 tablet by mouth  every 6 hours as needed for severe pain, Disp: 30 tablet, Rfl: 0    hydroxychloroquine (PLAQUENIL) 200 mg tablet, [HYDROXYCHLOROQUINE (PLAQUENIL) 200 MG TABLET] Take 400 mg by mouth daily. , Disp: , Rfl:     Lactobacillus rhamnosus GG (CULTURELLE) 10-15 Billion cell capsule, [LACTOBACILLUS RHAMNOSUS GG (CULTURELLE) 10-15 BILLION CELL CAPSULE] Take 1 capsule by mouth daily., Disp: , Rfl:     metoprolol tartrate (LOPRESSOR) 25 MG tablet, Take 1 tablet (25 mg) by mouth 2 times daily, Disp: 180 tablet, Rfl: 0    multivitamin therapeutic (THERAGRAN) tablet, [MULTIVITAMIN THERAPEUTIC (THERAGRAN) TABLET] Take 1 tablet by mouth daily., Disp: , Rfl:     omeprazole (PRILOSEC) 40 MG DR capsule, Take 1 capsule (40 mg) by mouth daily, Disp: 90 capsule, Rfl: 3    acetaminophen (TYLENOL) 325 MG tablet, Take 3 tablets (975 mg) by mouth every 8 hours as needed for mild pain (Patient not taking: Reported on 4/25/2023), Disp: 60 tablet, Rfl: 0    diclofenac sodium 3 % Gel, [DICLOFENAC SODIUM 3 % GEL] 4 g knees three times aday  and 2 g on hands or lebows three times a day (Patient not taking: Reported on 4/25/2023), Disp: 3 Tube, Rfl: 3    nitroGLYcerin (NITROSTAT) 0.4 MG sublingual tablet, For chest pain place 1 tablet under the tongue every 5 minutes for 3 doses. If symptoms persist 5 minutes after 1st dose call 911. (Patient not taking: Reported on 4/25/2023), Disp: 30 tablet, Rfl: 1    senna-docusate (SENOKOT-S/PERICOLACE) 8.6-50 MG tablet, Take 1 tablet by mouth 2 times daily as needed for constipation (Patient not taking: Reported on 4/25/2023), Disp: , Rfl:     ALLERGIES:      Allergies   Allergen Reactions    Codeine Unknown     nausea    Gabapentin Unknown     Fatigue      Pregabalin Unknown     Fatigue         ROS:  Gen: No fevers, weight loss/gain  CV: Denies chest pain, peripheral edema  Pulm: Denies SOB  GI/: Voiding without problems, appetite improving.     LABS:  None    IMAGING:  None    PHYSICAL EXAM:   /64  (BP Location: Right arm, Patient Position: Sitting, Cuff Size: Adult Regular)   Pulse 83   Resp 16   Wt 77.1 kg (170 lb)   SpO2 98%   BMI 25.47 kg/m    General: Alert and oriented, pleasant, no acute distress.  CV:  No peripheral edema.  Pulm: Easy work of breathing on room air.   GI: Soft, non-tender, and non-distended  Incision: Chest and left leg incisions clean dry and intact without erythema, swelling or drainage  Neuro: CNs grossly intact.      ASSESSMENT/PLAN:  Antonia Everett is a 73 year old year old female status post emergent CAB x 1 who returns to clinic for postop visit.     - Surgically doing well.  Incisions are healing well with no signs of infection. Sternum is stable.  - Hemodynamics are stable. No medication changes were needed today.  - Follow up with your cardiologist as scheduled with Dr Trevino 6/27 at 8:50  - Continue Cardiac Rehab until completed.   - May start driving  (4 weeks post-op) if not using narcotic pain medications.  - Continue strict sternal precautions until July 1. No lifting >10bs; may gradually increase at this point (6 weeks post-op).   - No need for further follow-up with CV surgery unless concerns. Feel free to call our office with questions. [phone]         Kristi Santos PA-C  Cardiothoracic Surgery  [phone]          Thank you for allowing me to participate in the care of your patient.      Sincerely,     Kristi Santos PA-C     [facility]  cc:   No referring provider defined for this encounter.

## 2023-04-25 NOTE — PROGRESS NOTES
CARDIOTHORACIC SURGERY FOLLOW-UP VISIT     Antonia Everett   1950   [id number]      Reason for visit: Post operative clinic visit. Patient underwent emergent coronary artery bypass grafting x1 with Dr. Zamorano on 3/23/23.    HPI: Antonia Everett is a 73 year old year old female seen in clinic for a routine follow-up appointment after surgery. Patient has past medical history as below. Hospital course was complicated by cardiac tamponade during angiogram, stent placement followed by emergent coronary artery bypass. Patient was discharged to home.    Patient has been doing well since discharge. Patient did follow-up with primary care since leaving the hospital. Reports that incisions are healing well. Denies fevers, peripheral edema. Appetite is improving and patient is voiding without difficulty. Weight has been stable. Pain management at this point with tylenol. Patient has been attending cardiac rehab and this is going well.       Of note, patient will be maintained on Plavix for one year due to RCA stent. When Plavix is stopped, ASA should be increased to 162mg daily indefinitely s/p CABG.    PAST MEDICAL HISTORY:  Past Medical History:   Diagnosis Date     Bleeding duodenal ulcer 2010     Chronic diarrhea 04/20/2017    Colonoscopy normal except inflammatory polyp or     Chronic left shoulder pain 9/26/2017     Chronic low back pain without sciatica 11/3/2017     Chronic pain      Chronic radicular low back pain     Pain clinic.  Nerve stimulator placement     Complete tear of left rotator cuff 4/3/2018     Duodenal ulcer     bleeding     Family history of breast cancer      GERD (gastroesophageal reflux disease)      H/O ulcer disease     secondary to medications     Herpes zoster 2010     History of transfusion      Hypercholesterolemia      LA (lupus anticoagulant) disorder (H)      Left hip pain 2015    bursitis, evaluated by orthopedics     Lichen sclerosus of female genitalia 6/16/2020     MRSA  (methicillin resistant staph aureus) culture positive     betweeen 1996 and 2000     Oral lichen planus 4/20/2017     Plantar fasciitis      Raynaud's disease without gangrene 11/20/2018    Involving both feet, describing burning sensation, redness and coolness to touch     Rheumatoid arthritis (H)     Followed by rheumatology, Dr. Felipe,      Screening     DEXA normal T score -0.9 2016, minimal change compared to previous DEXA     VRE (vancomycin resistant enterococcus) culture positive     between 1996 and 2000       PAST SURGICAL HISTORY:  Past Surgical History:   Procedure Laterality Date     ABDOMINAL HERNIA REPAIR       APPENDECTOMY       ARTHROSCOPY SHOULDER ROTATOR CUFF REPAIR Right      ARTHROSCOPY SHOULDER ROTATOR CUFF REPAIR Left 04/2018     BACK SURGERY       BIOPSY BREAST Left      BYPASS GRAFT ARTERY CORONARY N/A 3/23/2023    Procedure: CORONARY ARTERY BYPASS GRAFT X1 (CABG), EPIAORTIC ULTRASOUND, ENDOVEIN HARVEST FROM LEFT LOWER EXTREMITY;  Surgeon: Yessenia Zamorano MD;  Location: [facility] [place] OR     COLONOSCOPY      Normal colonoscopy May 2017 except inflammatory polyp     CV CORONARY ANGIOGRAM N/A 3/23/2023    Procedure: Coronary Angiogram;  Surgeon: Marcella Powers MD;  Location: [facility] CV     CV CORONARY LITHOTRIPSY PCI N/A 3/23/2023    Procedure: Percutaneous Coronary Intervention - Lithotripsy;  Surgeon: Marcella Powers MD;  Location: [facility] LAB CV     CV INTRA AORTIC BALLOON N/A 3/23/2023    Procedure: Intraprocedure Aortic Balloon Pump Insertion;  Surgeon: Marcella Powers MD;  Location: [facility] LAB CV     CV LEFT HEART CATH N/A 3/23/2023    Procedure: Left Heart Catheterization;  Surgeon: Marcella Powers MD;  Location: [facility] CV     CV PCI STENT DRUG ELUTING N/A 3/23/2023    Procedure: Percutaneous Coronary Intervention - Stent;  Surgeon: Marcella Powers MD;  Location: [facility] CV     CV PERICARDIOCENTESIS N/A 3/23/2023    Procedure:  Pericardiocentesis;  Surgeon: Marcella Powers MD;  Location: [facility] LAB CV     ESOPHAGOSCOPY, GASTROSCOPY, DUODENOSCOPY (EGD), COMBINED N/A 12/16/2018    Procedure: ESOPHAGOGASTRODUODENOSCOPY (EGD) with biopsy and foreign body removal;  Surgeon: Raymond Triana MD;  Location: [facility];  Service: Gastroenterology     HC REMOVAL OF TONSILS,<13 Y/O      Description: Tonsillectomy;  Recorded: 09/30/2011;     HYSTERECTOMY      LUIS ANGEL with BSO     HYSTERECTOMY TOTAL ABDOMINAL, BILATERAL SALPINGO-OOPHORECTOMY, COMBINED       KNEE SURGERY Right 2007    Arthroscopic knee surgery     OPEN REDUCTION INTERNAL FIXATION ELBOW  2014     [place] ARTHRODESIS ANT INTERBODY MIN DISCECTOMY,LUMBAR      Description: Lumbar Vertebral Fusion;  Recorded: 09/30/2011;  Comments: L5-S1 fusion     [place] STEREOTACT STIM SPINAL CORD      Description: Spinal Stereotaxis Stimulation Of Cord;  Recorded: 09/30/2011;  Comments: implanted 8/6/2007 with lead revision 2011     SKIN GRAFT  1996       CURRENT MEDICATIONS:     Current Outpatient Medications:      aspirin (ASA) 81 MG chewable tablet, Take 1 tablet (81 mg) by mouth daily Starting tomorrow., Disp: 30 tablet, Rfl: 2     atorvastatin (LIPITOR) 80 MG tablet, Take 1 tablet (80 mg) by mouth daily, Disp: 90 tablet, Rfl: 3     clopidogrel (PLAVIX) 75 MG tablet, Take 1 tablet (75 mg) by mouth daily, Disp: 90 tablet, Rfl: 3     estradiol (ESTRACE) 0.1 MG/GM vaginal cream, Place 0.5 g vaginally daily, Disp: 42.5 g, Rfl: 11     furosemide (LASIX) 20 MG tablet, Take 1 tablet (20 mg) by mouth 2 times daily For 3 days, then stop, Disp: 10 tablet, Rfl: 0     GOLIMUMAB (SIMPONI ARIA IV), [GOLIMUMAB (SIMPONI ARIA IV)] Infuse into a venous catheter every 2 (two) months. , Disp: , Rfl:      HYDROcodone-acetaminophen (NORCO) 5-325 MG tablet, Take 1 tablet by mouth every 6 hours as needed for severe pain, Disp: 30 tablet, Rfl: 0     hydroxychloroquine (PLAQUENIL) 200 mg tablet, [HYDROXYCHLOROQUINE  (PLAQUENIL) 200 MG TABLET] Take 400 mg by mouth daily. , Disp: , Rfl:      Lactobacillus rhamnosus GG (CULTURELLE) 10-15 Billion cell capsule, [LACTOBACILLUS RHAMNOSUS GG (CULTURELLE) 10-15 BILLION CELL CAPSULE] Take 1 capsule by mouth daily., Disp: , Rfl:      metoprolol tartrate (LOPRESSOR) 25 MG tablet, Take 1 tablet (25 mg) by mouth 2 times daily, Disp: 180 tablet, Rfl: 0     multivitamin therapeutic (THERAGRAN) tablet, [MULTIVITAMIN THERAPEUTIC (THERAGRAN) TABLET] Take 1 tablet by mouth daily., Disp: , Rfl:      omeprazole (PRILOSEC) 40 MG DR capsule, Take 1 capsule (40 mg) by mouth daily, Disp: 90 capsule, Rfl: 3     acetaminophen (TYLENOL) 325 MG tablet, Take 3 tablets (975 mg) by mouth every 8 hours as needed for mild pain (Patient not taking: Reported on 4/25/2023), Disp: 60 tablet, Rfl: 0     diclofenac sodium 3 % Gel, [DICLOFENAC SODIUM 3 % GEL] 4 g knees three times aday  and 2 g on hands or lebows three times a day (Patient not taking: Reported on 4/25/2023), Disp: 3 Tube, Rfl: 3     nitroGLYcerin (NITROSTAT) 0.4 MG sublingual tablet, For chest pain place 1 tablet under the tongue every 5 minutes for 3 doses. If symptoms persist 5 minutes after 1st dose call 911. (Patient not taking: Reported on 4/25/2023), Disp: 30 tablet, Rfl: 1     senna-docusate (SENOKOT-S/PERICOLACE) 8.6-50 MG tablet, Take 1 tablet by mouth 2 times daily as needed for constipation (Patient not taking: Reported on 4/25/2023), Disp: , Rfl:     ALLERGIES:      Allergies   Allergen Reactions     Codeine Unknown     nausea     Gabapentin Unknown     Fatigue       Pregabalin Unknown     Fatigue         ROS:  Gen: No fevers, weight loss/gain  CV: Denies chest pain, peripheral edema  Pulm: Denies SOB  GI/: Voiding without problems, appetite improving.     LABS:  None    IMAGING:  None    PHYSICAL EXAM:   /64 (BP Location: Right arm, Patient Position: Sitting, Cuff Size: Adult Regular)   Pulse 83   Resp 16   Wt 77.1 kg (170 lb)    SpO2 98%   BMI 25.47 kg/m    General: Alert and oriented, pleasant, no acute distress.  CV:  No peripheral edema.  Pulm: Easy work of breathing on room air.   GI: Soft, non-tender, and non-distended  Incision: Chest and left leg incisions clean dry and intact without erythema, swelling or drainage  Neuro: CNs grossly intact.      ASSESSMENT/PLAN:  Antonia Everett is a 73 year old year old female status post emergent CAB x 1 who returns to clinic for postop visit.     - Surgically doing well.  Incisions are healing well with no signs of infection. Sternum is stable.  - Hemodynamics are stable. No medication changes were needed today.  - Follow up with your cardiologist as scheduled with Dr Trevino 6/27 at 8:50  - Continue Cardiac Rehab until completed.   - May start driving  (4 weeks post-op) if not using narcotic pain medications.  - Continue strict sternal precautions until July 1. No lifting >10bs; may gradually increase at this point (6 weeks post-op).   - No need for further follow-up with CV surgery unless concerns. Feel free to call our office with questions. [phone]         Kristi Santos PA-C  Cardiothoracic Surgery  [phone]

## 2023-04-27 ENCOUNTER — HOSPITAL ENCOUNTER (OUTPATIENT)
Dept: CARDIAC REHAB | Facility: CLINIC | Age: 73
Discharge: HOME | End: 2023-04-27
Attending: INTERNAL MEDICINE
Payer: MEDICARE

## 2023-04-27 PROCEDURE — 93798 PHYS/QHP OP CAR RHAB W/ECG: CPT

## 2023-05-02 ENCOUNTER — HOSPITAL ENCOUNTER (OUTPATIENT)
Dept: CARDIAC REHAB | Facility: CLINIC | Age: 73
Discharge: HOME | End: 2023-05-02
Attending: INTERNAL MEDICINE
Payer: MEDICARE

## 2023-05-02 PROCEDURE — 93798 PHYS/QHP OP CAR RHAB W/ECG: CPT

## 2023-05-04 ENCOUNTER — HOSPITAL ENCOUNTER (OUTPATIENT)
Dept: CARDIAC REHAB | Facility: CLINIC | Age: 73
Discharge: HOME | End: 2023-05-04
Attending: INTERNAL MEDICINE
Payer: MEDICARE

## 2023-05-04 PROCEDURE — 93798 PHYS/QHP OP CAR RHAB W/ECG: CPT

## 2023-05-09 ENCOUNTER — HOSPITAL ENCOUNTER (OUTPATIENT)
Dept: CARDIAC REHAB | Facility: CLINIC | Age: 73
Discharge: HOME | End: 2023-05-09
Attending: INTERNAL MEDICINE
Payer: MEDICARE

## 2023-05-09 PROCEDURE — 93798 PHYS/QHP OP CAR RHAB W/ECG: CPT

## 2023-05-11 ENCOUNTER — HOSPITAL ENCOUNTER (OUTPATIENT)
Dept: CARDIAC REHAB | Facility: CLINIC | Age: 73
Discharge: HOME | End: 2023-05-11
Attending: INTERNAL MEDICINE
Payer: MEDICARE

## 2023-05-11 PROCEDURE — 93798 PHYS/QHP OP CAR RHAB W/ECG: CPT

## 2023-05-16 ENCOUNTER — HOSPITAL ENCOUNTER (OUTPATIENT)
Dept: CARDIAC REHAB | Facility: CLINIC | Age: 73
Discharge: HOME | End: 2023-05-16
Attending: INTERNAL MEDICINE
Payer: MEDICARE

## 2023-05-16 PROCEDURE — 93798 PHYS/QHP OP CAR RHAB W/ECG: CPT

## 2023-05-18 ENCOUNTER — HOSPITAL ENCOUNTER (OUTPATIENT)
Dept: CARDIAC REHAB | Facility: CLINIC | Age: 73
Discharge: HOME | End: 2023-05-18
Attending: INTERNAL MEDICINE
Payer: MEDICARE

## 2023-05-18 PROCEDURE — 93798 PHYS/QHP OP CAR RHAB W/ECG: CPT

## 2023-05-23 ENCOUNTER — HOSPITAL ENCOUNTER (OUTPATIENT)
Dept: CARDIAC REHAB | Facility: CLINIC | Age: 73
Discharge: HOME | End: 2023-05-23
Attending: INTERNAL MEDICINE
Payer: MEDICARE

## 2023-05-23 PROCEDURE — 93797 PHYS/QHP OP CAR RHAB WO ECG: CPT

## 2023-05-23 PROCEDURE — 93798 PHYS/QHP OP CAR RHAB W/ECG: CPT

## 2023-05-25 ENCOUNTER — HOSPITAL ENCOUNTER (OUTPATIENT)
Dept: CARDIAC REHAB | Facility: CLINIC | Age: 73
Discharge: HOME | End: 2023-05-25
Attending: INTERNAL MEDICINE
Payer: MEDICARE

## 2023-05-25 PROCEDURE — 93798 PHYS/QHP OP CAR RHAB W/ECG: CPT

## 2023-06-01 ENCOUNTER — HOSPITAL ENCOUNTER (OUTPATIENT)
Dept: CARDIAC REHAB | Facility: CLINIC | Age: 73
Discharge: HOME | End: 2023-06-01
Attending: INTERNAL MEDICINE
Payer: MEDICARE

## 2023-06-01 PROCEDURE — 93798 PHYS/QHP OP CAR RHAB W/ECG: CPT

## 2023-06-06 ENCOUNTER — HOSPITAL ENCOUNTER (OUTPATIENT)
Dept: CARDIAC REHAB | Facility: CLINIC | Age: 73
Discharge: HOME | End: 2023-06-06
Attending: INTERNAL MEDICINE
Payer: MEDICARE

## 2023-06-06 PROCEDURE — 93798 PHYS/QHP OP CAR RHAB W/ECG: CPT

## 2023-06-08 ENCOUNTER — HOSPITAL ENCOUNTER (OUTPATIENT)
Dept: CARDIAC REHAB | Facility: CLINIC | Age: 73
Discharge: HOME | End: 2023-06-08
Attending: INTERNAL MEDICINE
Payer: MEDICARE

## 2023-06-08 PROCEDURE — 93798 PHYS/QHP OP CAR RHAB W/ECG: CPT

## 2023-06-13 ENCOUNTER — HOSPITAL ENCOUNTER (OUTPATIENT)
Dept: CARDIAC REHAB | Facility: CLINIC | Age: 73
Discharge: HOME | End: 2023-06-13
Attending: INTERNAL MEDICINE
Payer: MEDICARE

## 2023-06-13 PROCEDURE — 93798 PHYS/QHP OP CAR RHAB W/ECG: CPT

## 2023-06-15 ENCOUNTER — HOSPITAL ENCOUNTER (OUTPATIENT)
Dept: CARDIAC REHAB | Facility: CLINIC | Age: 73
Discharge: HOME | End: 2023-06-15
Attending: INTERNAL MEDICINE
Payer: MEDICARE

## 2023-06-15 PROCEDURE — 93798 PHYS/QHP OP CAR RHAB W/ECG: CPT

## 2023-06-20 ENCOUNTER — HOSPITAL ENCOUNTER (OUTPATIENT)
Dept: CARDIAC REHAB | Facility: CLINIC | Age: 73
Discharge: HOME | End: 2023-06-20
Attending: INTERNAL MEDICINE
Payer: MEDICARE

## 2023-06-20 PROCEDURE — 93798 PHYS/QHP OP CAR RHAB W/ECG: CPT

## 2023-06-22 ENCOUNTER — HOSPITAL ENCOUNTER (OUTPATIENT)
Dept: CARDIAC REHAB | Facility: CLINIC | Age: 73
Discharge: HOME | End: 2023-06-22
Attending: INTERNAL MEDICINE
Payer: MEDICARE

## 2023-06-22 PROCEDURE — 93798 PHYS/QHP OP CAR RHAB W/ECG: CPT

## 2023-06-29 ENCOUNTER — OFFICE VISIT (OUTPATIENT)
Dept: CARDIOLOGY | Facility: CLINIC | Age: 73
End: 2023-06-29
Payer: MEDICARE

## 2023-06-29 ENCOUNTER — HOSPITAL ENCOUNTER (OUTPATIENT)
Dept: CARDIAC REHAB | Facility: CLINIC | Age: 73
Discharge: HOME | End: 2023-06-29
Attending: INTERNAL MEDICINE
Payer: MEDICARE

## 2023-06-29 VITALS
WEIGHT: 170.1 LBS | HEIGHT: 68 IN | TEMPERATURE: 100 F | DIASTOLIC BLOOD PRESSURE: 60 MMHG | RESPIRATION RATE: 16 BRPM | SYSTOLIC BLOOD PRESSURE: 116 MMHG | BODY MASS INDEX: 25.78 KG/M2

## 2023-06-29 DIAGNOSIS — E78.00 HYPERCHOLESTEROLEMIA: ICD-10-CM

## 2023-06-29 DIAGNOSIS — I21.9: ICD-10-CM

## 2023-06-29 DIAGNOSIS — R53.83 FATIGUE, UNSPECIFIED TYPE: ICD-10-CM

## 2023-06-29 DIAGNOSIS — I25.10 CORONARY ARTERY DISEASE INVOLVING NATIVE HEART WITHOUT ANGINA PECTORIS, UNSPECIFIED VESSEL OR LESION TYPE: Primary | ICD-10-CM

## 2023-06-29 DIAGNOSIS — R94.39 POSITIVE CARDIAC STRESS TEST: ICD-10-CM

## 2023-06-29 PROBLEM — I20.0 UNSTABLE ANGINA PECTORIS (H): Status: RESOLVED | Noted: 2023-03-23 | Resolved: 2023-06-29

## 2023-06-29 PROCEDURE — 99215 OFFICE O/P EST HI 40 MIN: CPT | Performed by: INTERNAL MEDICINE

## 2023-06-29 PROCEDURE — 93798 PHYS/QHP OP CAR RHAB W/ECG: CPT

## 2023-06-29 RX ORDER — METOPROLOL SUCCINATE 25 MG/1
12.5 TABLET, EXTENDED RELEASE ORAL DAILY
Qty: 45 TABLET | Refills: 3 | Status: SHIPPED | OUTPATIENT
Start: 2023-06-29 | End: 2023-10-19

## 2023-06-29 RX ORDER — SULFASALAZINE 500 MG/1
1000 TABLET, DELAYED RELEASE ORAL 2 TIMES DAILY
COMMUNITY
Start: 2023-06-01

## 2023-06-29 RX ORDER — CHLORHEXIDINE GLUCONATE ORAL RINSE 1.2 MG/ML
15 SOLUTION DENTAL PRN
COMMUNITY
Start: 2023-06-20

## 2023-06-29 NOTE — PROGRESS NOTES
HEART CARE ENCOUNTER CONSULTATON NOTE      [facility]  [phone]      Assessment/Recommendations   Assessment:   1.  Coronary artery disease status percutaneous coronary mention to the mid RCA (March 23, 2023).  During procedure patient developed coronary perforation and hemopericardium requiring emergent cardiothoracic surgery.  She underwent single-vessel bypass of the right coronary.  2.  Hyperlipidemia.   3.  Rheumatoid arthritis  4.  Fatigue, patient feels related metoprolol    Plan:   1.  Reduce metoprolol to metoprolol XL 12.5 mg daily.  2.  Aspirin 81 mg daily  3.  Continue Plavix 75 mg daily for 1 year (March 23, 2024)  4.  Continue atorvastatin  5.  Patient can graduate from cardiac rehab  6.  Continue doing daily exercise       History of Present Illness/Subjective    HPI: Antonia Everett is a 73 year old female with coronary artery disease status post single-vessel coronary bypass surgery.    Patient had complex course during her coronary angiogram.  Patient had complex coronary lesion in her right coronary artery.  During percutaneous coronary mention she experienced a coronary perforation which resulted in hemopericardium and cardiac tamponade.  She underwent emergent pericardial effusion to stabilize her condition along with balloon pump.  She is trans furred emergently to the OR to stabilize bleeding.  Dr. Zamorano performed cardiothoracic surgery which stabilized bleeding at her right coronary artery site and at the junction of the right atrium and right coronary artery.  She underwent single-vessel coronary bypass surgery of the right coronary as well.    Patient has made a complete recovery.  She is currently in cardiac rehab.  She is exercising at 7.5 METS with no difficulty.  Continues to exercise regularly at home including walking.    She has concerns that her beta-blocker may be causing her some lightheadedness and fatigue.  She notes that is hard to get her heart  "rates up with use of metoprolol.  We will decrease metoprolol to 12.5 mg daily metoprolol XL.      Reviewed discharge summary from cardiothoracic surgery.  Reviewed follow-up in cardiothoracic surgery.  Reviewed Dr. Zamorano's operating note.     Physical Examination  Review of Systems   Vitals: /60 (BP Location: Right arm, Patient Position: Sitting, Cuff Size: Adult Regular)   Temp 100  F (37.8  C)   Resp 16   Ht 1.727 m (5' 8\")   Wt 77.2 kg (170 lb 1.6 oz)   BMI 25.86 kg/m    BMI= Body mass index is 25.86 kg/m .  Wt Readings from Last 3 Encounters:   06/29/23 77.2 kg (170 lb 1.6 oz)   04/25/23 77.1 kg (170 lb)   03/30/23 79.6 kg (175 lb 6.4 oz)        Pleasant   ENT/Mouth: membranes moist, no oral lesions or bleeding gums.      EYES:  no scleral icterus, normal conjunctivae       Chest/Lungs:   lungs are clear to auscultation, no rales or wheezing, healed sternal scar, equal chest wall expansion    Cardiovascular:   Regular. Normal first and second heart sounds with no murmurs, rubs, or gallops; the carotid, radial and posterior tibial pulses are intact, Jugular venous pressure normal, no edema bilaterally        Extremities: no cyanosis or clubbing   Skin: no xanthelasma, warm.    Neurologic: normal  bilateral, no tremors     Psychiatric: alert and oriented x3, calm        Please refer above for cardiac ROS details.        Medical History  Surgical History Family History Social History   Past Medical History:   Diagnosis Date     Bleeding duodenal ulcer 2010     Chronic diarrhea 04/20/2017    Colonoscopy normal except inflammatory polyp or     Chronic left shoulder pain 9/26/2017     Chronic low back pain without sciatica 11/3/2017     Chronic pain      Chronic radicular low back pain     Pain clinic.  Nerve stimulator placement     Complete tear of left rotator cuff 4/3/2018     Duodenal ulcer     bleeding     Family history of breast cancer      GERD (gastroesophageal reflux disease)      H/O " ulcer disease     secondary to medications     Herpes zoster 2010     History of transfusion      Hypercholesterolemia      LA (lupus anticoagulant) disorder (H)      Left hip pain 2015    bursitis, evaluated by orthopedics     Lichen sclerosus of female genitalia 6/16/2020     MRSA (methicillin resistant staph aureus) culture positive     betweeen 1996 and 2000     Oral lichen planus 4/20/2017     Plantar fasciitis      Raynaud's disease without gangrene 11/20/2018    Involving both feet, describing burning sensation, redness and coolness to touch     Rheumatoid arthritis (H)     Followed by rheumatology, Dr. Felipe,      Screening     DEXA normal T score -0.9 2016, minimal change compared to previous DEXA     VRE (vancomycin resistant enterococcus) culture positive     between 1996 and 2000     Past Surgical History:   Procedure Laterality Date     ABDOMINAL HERNIA REPAIR       APPENDECTOMY       ARTHROSCOPY SHOULDER ROTATOR CUFF REPAIR Right      ARTHROSCOPY SHOULDER ROTATOR CUFF REPAIR Left 04/2018     BACK SURGERY       BIOPSY BREAST Left      BYPASS GRAFT ARTERY CORONARY N/A 3/23/2023    Procedure: CORONARY ARTERY BYPASS GRAFT X1 (CABG), EPIAORTIC ULTRASOUND, ENDOVEIN HARVEST FROM LEFT LOWER EXTREMITY;  Surgeon: Yessenia Zamorano MD;  Location: [facility] OR     COLONOSCOPY      Normal colonoscopy May 2017 except inflammatory polyp     CV CORONARY ANGIOGRAM N/A 3/23/2023    Procedure: Coronary Angiogram;  Surgeon: Marcella Powers MD;  Location: [facility] CV     CV CORONARY LITHOTRIPSY PCI N/A 3/23/2023    Procedure: Percutaneous Coronary Intervention - Lithotripsy;  Surgeon: Marcella Powers MD;  Location: [facility] LAB CV     CV INTRA AORTIC BALLOON N/A 3/23/2023    Procedure: Intraprocedure Aortic Balloon Pump Insertion;  Surgeon: Marcella Powers MD;  Location: [facility] LAB CV     CV LEFT HEART CATH N/A 3/23/2023    Procedure: Left Heart Catheterization;  Surgeon: Marcella Powers  MD;  Location: [facility] CV     CV PCI STENT DRUG ELUTING N/A 3/23/2023    Procedure: Percutaneous Coronary Intervention - Stent;  Surgeon: Marcella Powers MD;  Location: [facility] CV     CV PERICARDIOCENTESIS N/A 3/23/2023    Procedure: Pericardiocentesis;  Surgeon: Marcella Powers MD;  Location: [facility] CV     ESOPHAGOSCOPY, GASTROSCOPY, DUODENOSCOPY (EGD), COMBINED N/A 2018    Procedure: ESOPHAGOGASTRODUODENOSCOPY (EGD) with biopsy and foreign body removal;  Surgeon: Raymond Triana MD;  Location: [facility];  Service: Gastroenterology     HC REMOVAL OF TONSILS,<11 Y/O      Description: Tonsillectomy;  Recorded: 2011;     HYSTERECTOMY      LUIS ANGEL with BSO     HYSTERECTOMY TOTAL ABDOMINAL, BILATERAL SALPINGO-OOPHORECTOMY, COMBINED       KNEE SURGERY Right 2007    Arthroscopic knee surgery     OPEN REDUCTION INTERNAL FIXATION ELBOW       ID ARTHRODESIS ANT INTERBODY MIN DISCECTOMY,LUMBAR      Description: Lumbar Vertebral Fusion;  Recorded: 2011;  Comments: L5-S1 fusion     ID STEREOTACT STIM SPINAL CORD      Description: Spinal Stereotaxis Stimulation Of Cord;  Recorded: 2011;  Comments: implanted 2007 with lead revision      SKIN GRAFT       Family History   Problem Relation Age of Onset     Breast Cancer Mother 74.00     Cerebrovascular Disease Mother      Lung Cancer Father 70.00     Breast Cancer Maternal Aunt 70.00     Liver Cancer Brother 48.00        Social History     Socioeconomic History     Marital status:      Spouse name: Not on file     Number of children: Not on file     Years of education: Not on file     Highest education level: Not on file   Occupational History     Not on file   Tobacco Use     Smoking status: Former     Types: Cigarettes     Quit date: 1970     Years since quittin.1     Smokeless tobacco: Never   Vaping Use     Vaping Use: Never used   Substance and Sexual Activity     Alcohol use: No      Drug use: No     Sexual activity: Not on file   Other Topics Concern     Not on file   Social History Narrative    Lives at home with her  , gardening , bikes , swims 4-5 miles walk a day        Social Determinants of Health     Financial Resource Strain: Not on file   Food Insecurity: Not on file   Transportation Needs: Not on file   Physical Activity: Not on file   Stress: Not on file   Social Connections: Not on file   Intimate Partner Violence: Not on file   Housing Stability: Not on file           Medications  Allergies   Current Outpatient Medications   Medication Sig Dispense Refill     acetaminophen (TYLENOL) 325 MG tablet Take 3 tablets (975 mg) by mouth every 8 hours as needed for mild pain 60 tablet 0     aspirin (ASA) 81 MG chewable tablet Take 1 tablet (81 mg) by mouth daily Starting tomorrow. 30 tablet 2     atorvastatin (LIPITOR) 80 MG tablet Take 1 tablet (80 mg) by mouth daily 90 tablet 2     chlorhexidine (PERIDEX) 0.12 % solution Take 15 mLs by mouth as needed       clopidogrel (PLAVIX) 75 MG tablet Take 1 tablet (75 mg) by mouth daily 90 tablet 3     diclofenac sodium 3 % Gel [DICLOFENAC SODIUM 3 % GEL] 4 g knees three times aday  and 2 g on hands or lebows three times a day 3 Tube 3     estradiol (ESTRACE) 0.1 MG/GM vaginal cream Place 0.5 g vaginally daily 42.5 g 11     GOLIMUMAB (SIMPONI ARIA IV) [GOLIMUMAB (SIMPONI ARIA IV)] Infuse into a venous catheter every 2 (two) months.        hydroxychloroquine (PLAQUENIL) 200 mg tablet [HYDROXYCHLOROQUINE (PLAQUENIL) 200 MG TABLET] Take 400 mg by mouth daily.        Lactobacillus rhamnosus GG (CULTURELLE) 10-15 Billion cell capsule [LACTOBACILLUS RHAMNOSUS GG (CULTURELLE) 10-15 BILLION CELL CAPSULE] Take 1 capsule by mouth daily.       metoprolol tartrate (LOPRESSOR) 25 MG tablet Take 1 tablet (25 mg) by mouth 2 times daily 180 tablet 2     multivitamin therapeutic (THERAGRAN) tablet [MULTIVITAMIN THERAPEUTIC (THERAGRAN) TABLET] Take 1 tablet  by mouth daily.       nitroGLYcerin (NITROSTAT) 0.4 MG sublingual tablet For chest pain place 1 tablet under the tongue every 5 minutes for 3 doses. If symptoms persist 5 minutes after 1st dose call 911. 30 tablet 1     omeprazole (PRILOSEC) 40 MG DR capsule Take 1 capsule (40 mg) by mouth daily 90 capsule 3     sulfaSALAzine ER (AZULFIDINE EN) 500 MG EC tablet Take 1,000 mg by mouth 2 times daily         Allergies   Allergen Reactions     Codeine Unknown     nausea     Gabapentin Unknown     Fatigue       Pregabalin Unknown     Fatigue            Lab Results    Chemistry/lipid CBC Cardiac Enzymes/BNP/TSH/INR   Recent Labs   Lab Test 02/16/23  1229   CHOL 220*   HDL 96   *   TRIG 80     Recent Labs   Lab Test 02/16/23  1229 09/13/22  1028 06/24/21  0943   * 158* 147*     Recent Labs   Lab Test 02/16/23  1229      POTASSIUM 4.5   CHLORIDE 104   CO2 25   *   BUN 23.9*   CR 0.91   GFRESTIMATED 67   LULU 10.0     Recent Labs   Lab Test 02/16/23  1229 06/24/21  0943   CR 0.91 0.92     Recent Labs   Lab Test 02/16/23  1229 09/13/22  1028 06/24/21  0943   A1C 5.8* 5.8* 5.7*          Recent Labs   Lab Test 06/24/21  0943   WBC 3.6*   HGB 14.0   HCT 42.9           Recent Labs   Lab Test 06/24/21  0943 04/03/18  1422   HGB 14.0 14.0    No results for input(s): TROPONINI in the last 51053 hours.  No results for input(s): BNP, NTBNPI, NTBNP in the last 05453 hours.  Recent Labs   Lab Test 09/13/22  1028   TSH 2.23     No results for input(s): INR in the last 18248 hours.     Germán Trevino,     Today's clinic visit entailed:  42 minutes spent on the date of the encounter doing chart review, history and exam, documentation and further activities per the note.  Extensive review of her hospital course.

## 2023-06-29 NOTE — LETTER
6/29/2023    Sarah Beth Carvalho MD  [address] [facility] [address]    RE: Antonia Everett       Dear Colleague,     I had the pleasure of seeing Antonia Everett in the [facility].    HEART CARE ENCOUNTER CONSULTATON NOTE      [facility]  [phone]      Assessment/Recommendations   Assessment:   1.  Coronary artery disease status percutaneous coronary mention to the mid RCA (March 23, 2023).  During procedure patient developed coronary perforation and hemopericardium requiring emergent cardiothoracic surgery.  She underwent single-vessel bypass of the right coronary.  2.  Hyperlipidemia.   3.  Rheumatoid arthritis  4.  Fatigue, patient feels related metoprolol    Plan:   1.  Reduce metoprolol to metoprolol XL 12.5 mg daily.  2.  Aspirin 81 mg daily  3.  Continue Plavix 75 mg daily for 1 year (March 23, 2024)  4.  Continue atorvastatin  5.  Patient can graduate from cardiac rehab  6.  Continue doing daily exercise       History of Present Illness/Subjective    HPI: Antonia Everett is a 73 year old female with coronary artery disease status post single-vessel coronary bypass surgery.    Patient had complex course during her coronary angiogram.  Patient had complex coronary lesion in her right coronary artery.  During percutaneous coronary mention she experienced a coronary perforation which resulted in hemopericardium and cardiac tamponade.  She underwent emergent pericardial effusion to stabilize her condition along with balloon pump.  She is trans furred emergently to the OR to stabilize bleeding.  Dr. Zamorano performed cardiothoracic surgery which stabilized bleeding at her right coronary artery site and at the junction of the right atrium and right coronary artery.  She underwent single-vessel coronary bypass surgery of the right coronary as well.    Patient has made a complete recovery.  She is currently in cardiac rehab.  She is exercising at 7.5 METS with no  "difficulty.  Continues to exercise regularly at home including walking.    She has concerns that her beta-blocker may be causing her some lightheadedness and fatigue.  She notes that is hard to get her heart rates up with use of metoprolol.  We will decrease metoprolol to 12.5 mg daily metoprolol XL.      Reviewed discharge summary from cardiothoracic surgery.  Reviewed follow-up in cardiothoracic surgery.  Reviewed Dr. Zamorano's operating note.     Physical Examination  Review of Systems   Vitals: /60 (BP Location: Right arm, Patient Position: Sitting, Cuff Size: Adult Regular)   Temp 100  F (37.8  C)   Resp 16   Ht 1.727 m (5' 8\")   Wt 77.2 kg (170 lb 1.6 oz)   BMI 25.86 kg/m    BMI= Body mass index is 25.86 kg/m .  Wt Readings from Last 3 Encounters:   06/29/23 77.2 kg (170 lb 1.6 oz)   04/25/23 77.1 kg (170 lb)   03/30/23 79.6 kg (175 lb 6.4 oz)        Pleasant   ENT/Mouth: membranes moist, no oral lesions or bleeding gums.      EYES:  no scleral icterus, normal conjunctivae       Chest/Lungs:   lungs are clear to auscultation, no rales or wheezing, healed sternal scar, equal chest wall expansion    Cardiovascular:   Regular. Normal first and second heart sounds with no murmurs, rubs, or gallops; the carotid, radial and posterior tibial pulses are intact, Jugular venous pressure normal, no edema bilaterally        Extremities: no cyanosis or clubbing   Skin: no xanthelasma, warm.    Neurologic: normal  bilateral, no tremors     Psychiatric: alert and oriented x3, calm        Please refer above for cardiac ROS details.        Medical History  Surgical History Family History Social History   Past Medical History:   Diagnosis Date    Bleeding duodenal ulcer 2010    Chronic diarrhea 04/20/2017    Colonoscopy normal except inflammatory polyp or    Chronic left shoulder pain 9/26/2017    Chronic low back pain without sciatica 11/3/2017    Chronic pain     Chronic radicular low back pain     Pain clinic. "  Nerve stimulator placement    Complete tear of left rotator cuff 4/3/2018    Duodenal ulcer     bleeding    Family history of breast cancer     GERD (gastroesophageal reflux disease)     H/O ulcer disease     secondary to medications    Herpes zoster 2010    History of transfusion     Hypercholesterolemia     LA (lupus anticoagulant) disorder (H)     Left hip pain 2015    bursitis, evaluated by orthopedics    Lichen sclerosus of female genitalia 6/16/2020    MRSA (methicillin resistant staph aureus) culture positive     betweeen 1996 and 2000    Oral lichen planus 4/20/2017    Plantar fasciitis     Raynaud's disease without gangrene 11/20/2018    Involving both feet, describing burning sensation, redness and coolness to touch    Rheumatoid arthritis (H)     Followed by rheumatology, Dr. Felipe,     Screening     DEXA normal T score -0.9 2016, minimal change compared to previous DEXA    VRE (vancomycin resistant enterococcus) culture positive     between 1996 and 2000     Past Surgical History:   Procedure Laterality Date    ABDOMINAL HERNIA REPAIR      APPENDECTOMY      ARTHROSCOPY SHOULDER ROTATOR CUFF REPAIR Right     ARTHROSCOPY SHOULDER ROTATOR CUFF REPAIR Left 04/2018    BACK SURGERY      BIOPSY BREAST Left     BYPASS GRAFT ARTERY CORONARY N/A 3/23/2023    Procedure: CORONARY ARTERY BYPASS GRAFT X1 (CABG), EPIAORTIC ULTRASOUND, ENDOVEIN HARVEST FROM LEFT LOWER EXTREMITY;  Surgeon: Yessenia Zamorano MD;  Location: [facility] OR    COLONOSCOPY      Normal colonoscopy May 2017 except inflammatory polyp    CV CORONARY ANGIOGRAM N/A 3/23/2023    Procedure: Coronary Angiogram;  Surgeon: Marcella Powers MD;  Location: [facility] CATH LAB CV    CV CORONARY LITHOTRIPSY PCI N/A 3/23/2023    Procedure: Percutaneous Coronary Intervention - Lithotripsy;  Surgeon: Marcella Powers MD;  Location: [facility] CATH LAB CV    CV INTRA AORTIC BALLOON N/A 3/23/2023    Procedure: Intraprocedure Aortic Balloon Pump Insertion;   Surgeon: Marcella Powers MD;  Location: [facility] CV    CV LEFT HEART CATH N/A 3/23/2023    Procedure: Left Heart Catheterization;  Surgeon: Marcella Powers MD;  Location: [facility] CV    CV PCI STENT DRUG ELUTING N/A 3/23/2023    Procedure: Percutaneous Coronary Intervention - Stent;  Surgeon: Marcella Powers MD;  Location: [facility] CV    CV PERICARDIOCENTESIS N/A 3/23/2023    Procedure: Pericardiocentesis;  Surgeon: Marcella Powers MD;  Location: [facility]    ESOPHAGOSCOPY, GASTROSCOPY, DUODENOSCOPY (EGD), COMBINED N/A 12/16/2018    Procedure: ESOPHAGOGASTRODUODENOSCOPY (EGD) with biopsy and foreign body removal;  Surgeon: Raymond Triana MD;  Location: [facility];  Service: Gastroenterology    HC REMOVAL OF TONSILS,<13 Y/O      Description: Tonsillectomy;  Recorded: 09/30/2011;    HYSTERECTOMY      LUIS ANGEL with BSO    HYSTERECTOMY TOTAL ABDOMINAL, BILATERAL SALPINGO-OOPHORECTOMY, COMBINED      KNEE SURGERY Right 2007    Arthroscopic knee surgery    OPEN REDUCTION INTERNAL FIXATION ELBOW  2014    MO ARTHRODESIS ANT INTERBODY MIN DISCECTOMY,LUMBAR      Description: Lumbar Vertebral Fusion;  Recorded: 09/30/2011;  Comments: L5-S1 fusion    MO STEREOTACT STIM SPINAL CORD      Description: Spinal Stereotaxis Stimulation Of Cord;  Recorded: 09/30/2011;  Comments: implanted 8/6/2007 with lead revision 2011    SKIN GRAFT  1996     Family History   Problem Relation Age of Onset    Breast Cancer Mother 74.00    Cerebrovascular Disease Mother     Lung Cancer Father 70.00    Breast Cancer Maternal Aunt 70.00    Liver Cancer Brother 48.00        Social History     Socioeconomic History    Marital status:      Spouse name: Not on file    Number of children: Not on file    Years of education: Not on file    Highest education level: Not on file   Occupational History    Not on file   Tobacco Use    Smoking status: Former     Types: Cigarettes     Quit date: 5/11/1970     Years  since quittin.1    Smokeless tobacco: Never   Vaping Use    Vaping Use: Never used   Substance and Sexual Activity    Alcohol use: No    Drug use: No    Sexual activity: Not on file   Other Topics Concern    Not on file   Social History Narrative    Lives at home with her  , gardening , bikes , swims 4-5 miles walk a day        Social Determinants of Health     Financial Resource Strain: Not on file   Food Insecurity: Not on file   Transportation Needs: Not on file   Physical Activity: Not on file   Stress: Not on file   Social Connections: Not on file   Intimate Partner Violence: Not on file   Housing Stability: Not on file           Medications  Allergies   Current Outpatient Medications   Medication Sig Dispense Refill    acetaminophen (TYLENOL) 325 MG tablet Take 3 tablets (975 mg) by mouth every 8 hours as needed for mild pain 60 tablet 0    aspirin (ASA) 81 MG chewable tablet Take 1 tablet (81 mg) by mouth daily Starting tomorrow. 30 tablet 2    atorvastatin (LIPITOR) 80 MG tablet Take 1 tablet (80 mg) by mouth daily 90 tablet 2    chlorhexidine (PERIDEX) 0.12 % solution Take 15 mLs by mouth as needed      clopidogrel (PLAVIX) 75 MG tablet Take 1 tablet (75 mg) by mouth daily 90 tablet 3    diclofenac sodium 3 % Gel [DICLOFENAC SODIUM 3 % GEL] 4 g knees three times aday  and 2 g on hands or lebows three times a day 3 Tube 3    estradiol (ESTRACE) 0.1 MG/GM vaginal cream Place 0.5 g vaginally daily 42.5 g 11    GOLIMUMAB (SIMPONI ARIA IV) [GOLIMUMAB (SIMPONI ARIA IV)] Infuse into a venous catheter every 2 (two) months.       hydroxychloroquine (PLAQUENIL) 200 mg tablet [HYDROXYCHLOROQUINE (PLAQUENIL) 200 MG TABLET] Take 400 mg by mouth daily.       Lactobacillus rhamnosus GG (CULTURELLE) 10-15 Billion cell capsule [LACTOBACILLUS RHAMNOSUS GG (CULTURELLE) 10-15 BILLION CELL CAPSULE] Take 1 capsule by mouth daily.      metoprolol tartrate (LOPRESSOR) 25 MG tablet Take 1 tablet (25 mg) by mouth 2 times  daily 180 tablet 2    multivitamin therapeutic (THERAGRAN) tablet [MULTIVITAMIN THERAPEUTIC (THERAGRAN) TABLET] Take 1 tablet by mouth daily.      nitroGLYcerin (NITROSTAT) 0.4 MG sublingual tablet For chest pain place 1 tablet under the tongue every 5 minutes for 3 doses. If symptoms persist 5 minutes after 1st dose call 911. 30 tablet 1    omeprazole (PRILOSEC) 40 MG DR capsule Take 1 capsule (40 mg) by mouth daily 90 capsule 3    sulfaSALAzine ER (AZULFIDINE EN) 500 MG EC tablet Take 1,000 mg by mouth 2 times daily         Allergies   Allergen Reactions    Codeine Unknown     nausea    Gabapentin Unknown     Fatigue      Pregabalin Unknown     Fatigue            Lab Results    Chemistry/lipid CBC Cardiac Enzymes/BNP/TSH/INR   Recent Labs   Lab Test 02/16/23  1229   CHOL 220*   HDL 96   *   TRIG 80     Recent Labs   Lab Test 02/16/23  1229 09/13/22  1028 06/24/21  0943   * 158* 147*     Recent Labs   Lab Test 02/16/23  1229      POTASSIUM 4.5   CHLORIDE 104   CO2 25   *   BUN 23.9*   CR 0.91   GFRESTIMATED 67   LULU 10.0     Recent Labs   Lab Test 02/16/23  1229 06/24/21  0943   CR 0.91 0.92     Recent Labs   Lab Test 02/16/23  1229 09/13/22  1028 06/24/21  0943   A1C 5.8* 5.8* 5.7*          Recent Labs   Lab Test 06/24/21  0943   WBC 3.6*   HGB 14.0   HCT 42.9           Recent Labs   Lab Test 06/24/21  0943 04/03/18  1422   HGB 14.0 14.0    No results for input(s): TROPONINI in the last 24494 hours.  No results for input(s): BNP, NTBNPI, NTBNP in the last 91157 hours.  Recent Labs   Lab Test 09/13/22  1028   TSH 2.23     No results for input(s): INR in the last 77685 hours.     Germán Trevino DO    Today's clinic visit entailed:  42 minutes spent on the date of the encounter doing chart review, history and exam, documentation and further activities per the note.  Extensive review of her hospital course.      Thank you for allowing me to participate in the care of your  patient.      Sincerely,     Germán Trevino [facility]  cc:   Marcella Powers MD  [address] [facility] [address] [facility] [address]  [place] [address]

## 2023-06-29 NOTE — PATIENT INSTRUCTIONS
Please contact direct nurses line Monday through Friday 8 AM to 5 PM @ [phone]    After-hours contact cardiology office at [phone].

## 2023-07-06 ENCOUNTER — HOSPITAL ENCOUNTER (OUTPATIENT)
Dept: CARDIAC REHAB | Facility: CLINIC | Age: 73
Discharge: HOME | End: 2023-07-06
Attending: INTERNAL MEDICINE
Payer: MEDICARE

## 2023-07-06 PROCEDURE — 93798 PHYS/QHP OP CAR RHAB W/ECG: CPT

## 2023-07-11 ENCOUNTER — HOSPITAL ENCOUNTER (OUTPATIENT)
Dept: CARDIAC REHAB | Facility: CLINIC | Age: 73
Discharge: HOME | End: 2023-07-11
Attending: INTERNAL MEDICINE
Payer: MEDICARE

## 2023-07-11 PROCEDURE — 93798 PHYS/QHP OP CAR RHAB W/ECG: CPT

## 2023-07-12 ENCOUNTER — HOSPITAL ENCOUNTER (OUTPATIENT)
Dept: CARDIAC REHAB | Facility: CLINIC | Age: 73
Discharge: HOME | End: 2023-07-12
Attending: INTERNAL MEDICINE
Payer: MEDICARE

## 2023-07-12 PROCEDURE — 93797 PHYS/QHP OP CAR RHAB WO ECG: CPT

## 2023-07-12 PROCEDURE — 93798 PHYS/QHP OP CAR RHAB W/ECG: CPT

## 2023-08-14 ENCOUNTER — PATIENT OUTREACH (OUTPATIENT)
Dept: CARE COORDINATION | Facility: CLINIC | Age: 73
End: 2023-08-14
Payer: MEDICARE

## 2023-08-28 ENCOUNTER — TRANSFERRED RECORDS (OUTPATIENT)
Dept: HEALTH INFORMATION MANAGEMENT | Facility: CLINIC | Age: 73
End: 2023-08-28
Payer: MEDICARE

## 2023-08-31 ENCOUNTER — ANCILLARY PROCEDURE (OUTPATIENT)
Dept: MAMMOGRAPHY | Facility: CLINIC | Age: 73
End: 2023-08-31
Attending: INTERNAL MEDICINE
Payer: MEDICARE

## 2023-08-31 DIAGNOSIS — Z12.31 VISIT FOR SCREENING MAMMOGRAM: ICD-10-CM

## 2023-08-31 PROCEDURE — 77067 SCR MAMMO BI INCL CAD: CPT | Mod: TC | Performed by: RADIOLOGY

## 2023-08-31 PROCEDURE — 77063 BREAST TOMOSYNTHESIS BI: CPT | Mod: TC | Performed by: RADIOLOGY

## 2023-09-05 ENCOUNTER — TRANSFERRED RECORDS (OUTPATIENT)
Dept: HEALTH INFORMATION MANAGEMENT | Facility: CLINIC | Age: 73
End: 2023-09-05
Payer: MEDICARE

## 2023-10-12 ENCOUNTER — LAB (OUTPATIENT)
Dept: LAB | Facility: CLINIC | Age: 73
End: 2023-10-12
Payer: MEDICARE

## 2023-10-12 ENCOUNTER — OFFICE VISIT (OUTPATIENT)
Dept: CARDIOLOGY | Facility: CLINIC | Age: 73
End: 2023-10-12
Payer: MEDICARE

## 2023-10-12 VITALS
BODY MASS INDEX: 25.94 KG/M2 | OXYGEN SATURATION: 94 % | WEIGHT: 170.6 LBS | RESPIRATION RATE: 20 BRPM | SYSTOLIC BLOOD PRESSURE: 98 MMHG | DIASTOLIC BLOOD PRESSURE: 58 MMHG | HEART RATE: 86 BPM

## 2023-10-12 DIAGNOSIS — I21.9: ICD-10-CM

## 2023-10-12 DIAGNOSIS — E78.2 MIXED HYPERLIPIDEMIA: ICD-10-CM

## 2023-10-12 DIAGNOSIS — Z79.899 ENCOUNTER FOR LONG-TERM (CURRENT) USE OF MEDICATIONS: Primary | ICD-10-CM

## 2023-10-12 DIAGNOSIS — R53.83 OTHER FATIGUE: ICD-10-CM

## 2023-10-12 DIAGNOSIS — I25.10 CORONARY ARTERY DISEASE INVOLVING NATIVE HEART WITHOUT ANGINA PECTORIS, UNSPECIFIED VESSEL OR LESION TYPE: Primary | ICD-10-CM

## 2023-10-12 DIAGNOSIS — D64.9 ANEMIA, UNSPECIFIED TYPE: ICD-10-CM

## 2023-10-12 LAB
ALBUMIN SERPL BCG-MCNC: 4.2 G/DL (ref 3.5–5.2)
ALT SERPL W P-5'-P-CCNC: 21 U/L (ref 0–50)
AST SERPL W P-5'-P-CCNC: 32 U/L (ref 0–45)
BASO+EOS+MONOS # BLD AUTO: ABNORMAL 10*3/UL
BASO+EOS+MONOS NFR BLD AUTO: ABNORMAL %
BASOPHILS # BLD AUTO: 0 10E3/UL (ref 0–0.2)
BASOPHILS NFR BLD AUTO: 1 %
CREAT SERPL-MCNC: 0.94 MG/DL (ref 0.51–0.95)
EGFRCR SERPLBLD CKD-EPI 2021: 64 ML/MIN/1.73M2
EOSINOPHIL # BLD AUTO: 0.1 10E3/UL (ref 0–0.7)
EOSINOPHIL NFR BLD AUTO: 3 %
ERYTHROCYTE [DISTWIDTH] IN BLOOD BY AUTOMATED COUNT: 18.1 % (ref 10–15)
FERRITIN SERPL-MCNC: 11 NG/ML (ref 11–328)
HCT VFR BLD AUTO: 33.4 % (ref 35–47)
HGB BLD-MCNC: 9.9 G/DL (ref 11.7–15.7)
IMM GRANULOCYTES # BLD: 0 10E3/UL
IMM GRANULOCYTES NFR BLD: 0 %
LYMPHOCYTES # BLD AUTO: 1.2 10E3/UL (ref 0.8–5.3)
LYMPHOCYTES NFR BLD AUTO: 37 %
MCH RBC QN AUTO: 24.9 PG (ref 26.5–33)
MCHC RBC AUTO-ENTMCNC: 29.6 G/DL (ref 31.5–36.5)
MCV RBC AUTO: 84 FL (ref 78–100)
MONOCYTES # BLD AUTO: 0.4 10E3/UL (ref 0–1.3)
MONOCYTES NFR BLD AUTO: 12 %
NEUTROPHILS # BLD AUTO: 1.5 10E3/UL (ref 1.6–8.3)
NEUTROPHILS NFR BLD AUTO: 47 %
NRBC # BLD AUTO: 0 10E3/UL
NRBC BLD AUTO-RTO: 0 /100
PLATELET # BLD AUTO: 242 10E3/UL (ref 150–450)
RBC # BLD AUTO: 3.97 10E6/UL (ref 3.8–5.2)
WBC # BLD AUTO: 3.1 10E3/UL (ref 4–11)

## 2023-10-12 PROCEDURE — 82565 ASSAY OF CREATININE: CPT

## 2023-10-12 PROCEDURE — 36415 COLL VENOUS BLD VENIPUNCTURE: CPT | Performed by: INTERNAL MEDICINE

## 2023-10-12 PROCEDURE — 85025 COMPLETE CBC W/AUTO DIFF WBC: CPT

## 2023-10-12 PROCEDURE — 82040 ASSAY OF SERUM ALBUMIN: CPT

## 2023-10-12 PROCEDURE — 84460 ALANINE AMINO (ALT) (SGPT): CPT

## 2023-10-12 PROCEDURE — 82728 ASSAY OF FERRITIN: CPT | Performed by: INTERNAL MEDICINE

## 2023-10-12 PROCEDURE — 99214 OFFICE O/P EST MOD 30 MIN: CPT | Performed by: INTERNAL MEDICINE

## 2023-10-12 PROCEDURE — 84450 TRANSFERASE (AST) (SGOT): CPT

## 2023-10-12 ASSESSMENT — ENCOUNTER SYMPTOMS
HEARTBURN: 0
NAUSEA: 0
ABDOMINAL PAIN: 0
HEMATOCHEZIA: 0
JOINT SWELLING: 1
DYSURIA: 0
CONSTIPATION: 0
WEAKNESS: 0
CHILLS: 0
HEMATURIA: 0
MYALGIAS: 0
COUGH: 0
EYE PAIN: 0
PARESTHESIAS: 0
BREAST MASS: 0
HEADACHES: 0
SORE THROAT: 0
DIZZINESS: 0
SHORTNESS OF BREATH: 0
NERVOUS/ANXIOUS: 0
PALPITATIONS: 0
FREQUENCY: 0
ARTHRALGIAS: 1
DIARRHEA: 0
FEVER: 0

## 2023-10-12 ASSESSMENT — ACTIVITIES OF DAILY LIVING (ADL): CURRENT_FUNCTION: NO ASSISTANCE NEEDED

## 2023-10-12 NOTE — PATIENT INSTRUCTIONS
Please contact direct nurses line Monday through Friday 8 AM to 5 PM @ [phone]    After-hours contact cardiology office at [phone].

## 2023-10-12 NOTE — PROGRESS NOTES
HEART CARE ENCOUNTER CONSULTATON NOTE      [facility]  [phone]      Assessment/Recommendations   Assessment:   1.  Coronary artery disease status percutaneous coronary intervention to the mid RCA (March 23, 2023).  During procedure patient developed coronary perforation and hemopericardium requiring emergent cardiothoracic surgery.  She underwent single-vessel bypass of the right coronary.  Currently doing well.  No active chest pain symptoms  2.  Hyperlipidemia  3.  Rheumatoid arthritis  4.  Fatigue, patient feels related metoprolol    Plan:   1.  Metoprolol has been discontinued due to fatigue symptoms  2.  Aspirin 81 mg daily  3.  Continue Plavix 75 mg daily for 1 year (March 23, 2024)  4.  Continue atorvastatin  5.  Patient continues to walk 4 to 5 miles per day.       History of Present Illness/Subjective    HPI: Antonia Everett is a 73 year old female with coronary artery disease status post single-vessel coronary bypass surgery.    Patient had complex course during her coronary angiogram in March 2023.  Patient had complex coronary lesion in her right coronary artery.  During percutaneous coronary intervention, she experienced a coronary perforation which resulted in hemopericardium and cardiac tamponade.  She underwent emergent pericardial effusion to stabilize her condition along with balloon pump.  She is emergently underwent surgery to stabilize bleeding.  Dr. Zamorano performed cardiothoracic surgery which stabilized bleeding at her right coronary artery site and at the junction of the right atrium and right coronary artery.  She underwent single-vessel coronary bypass surgery of the right coronary as well. Patient has made a complete recovery.  She is completed cardiac rehab.  She is currently walking 4-5 miles per day.     She stopped her beta-blocker therapy due to fatigue which has somewhat helped her overall symptoms.  No active bleeding issues to dual antiplatelet therapy.   We will check hemoglobin level given some level of fatigue.      Reviewed discharge summary from cardiothoracic surgery.  Reviewed follow-up in cardiothoracic surgery.  Reviewed Dr. Zamorano's operating note.     Physical Examination  Review of Systems   Vitals: BP 98/58 (BP Location: Left arm, Patient Position: Sitting, Cuff Size: Adult Regular)   Pulse 86   Resp 20   Wt 77.4 kg (170 lb 9.6 oz)   SpO2 94%   BMI 25.94 kg/m    BMI= Body mass index is 25.94 kg/m .  Wt Readings from Last 3 Encounters:   10/12/23 77.4 kg (170 lb 9.6 oz)   06/29/23 77.2 kg (170 lb 1.6 oz)   04/25/23 77.1 kg (170 lb)        Pleasant   ENT/Mouth: membranes moist, no oral lesions or bleeding gums.      EYES:  no scleral icterus, normal conjunctivae       Chest/Lungs:   lungs are clear to auscultation, no rales or wheezing, healed sternal scar, equal chest wall expansion    Cardiovascular:   Regular. Normal first and second heart sounds with no murmurs, rubs, or gallops; the carotid, radial and posterior tibial pulses are intact, Jugular venous pressure normal, no edema bilaterally        Extremities: no cyanosis or clubbing   Skin: no xanthelasma, warm.    Neurologic: normal  bilateral, no tremors     Psychiatric: alert and oriented x3, calm        Please refer above for cardiac ROS details.        Medical History  Surgical History Family History Social History   Past Medical History:   Diagnosis Date    Bleeding duodenal ulcer 2010    Chronic diarrhea 04/20/2017    Colonoscopy normal except inflammatory polyp or    Chronic left shoulder pain 9/26/2017    Chronic low back pain without sciatica 11/3/2017    Chronic pain     Chronic radicular low back pain     Pain clinic.  Nerve stimulator placement    Complete tear of left rotator cuff 4/3/2018    Duodenal ulcer     bleeding    Family history of breast cancer     GERD (gastroesophageal reflux disease)     H/O ulcer disease     secondary to medications    Herpes zoster 2010     History of transfusion     Hypercholesterolemia     LA (lupus anticoagulant) disorder (H24)     Left hip pain 2015    bursitis, evaluated by orthopedics    Lichen sclerosus of female genitalia 6/16/2020    MRSA (methicillin resistant staph aureus) culture positive     betweeen 1996 and 2000    Oral lichen planus 4/20/2017    Plantar fasciitis     Raynaud's disease without gangrene 11/20/2018    Involving both feet, describing burning sensation, redness and coolness to touch    Rheumatoid arthritis (H)     Followed by rheumatology, Dr. Felipe,     Screening     DEXA normal T score -0.9 2016, minimal change compared to previous DEXA    VRE (vancomycin resistant enterococcus) culture positive     between 1996 and 2000     Past Surgical History:   Procedure Laterality Date    ABDOMINAL HERNIA REPAIR      APPENDECTOMY      ARTHROSCOPY SHOULDER ROTATOR CUFF REPAIR Right     ARTHROSCOPY SHOULDER ROTATOR CUFF REPAIR Left 04/2018    BACK SURGERY      BIOPSY BREAST Left     BYPASS GRAFT ARTERY CORONARY N/A 3/23/2023    Procedure: CORONARY ARTERY BYPASS GRAFT X1 (CABG), EPIAORTIC ULTRASOUND, ENDOVEIN HARVEST FROM LEFT LOWER EXTREMITY;  Surgeon: Yessenia Zamorano MD;  Location: [facility] OR    COLONOSCOPY      Normal colonoscopy May 2017 except inflammatory polyp    CV CORONARY ANGIOGRAM N/A 3/23/2023    Procedure: Coronary Angiogram;  Surgeon: Marcella Powers MD;  Location: [facility] LAB CV    CV CORONARY LITHOTRIPSY PCI N/A 3/23/2023    Procedure: Percutaneous Coronary Intervention - Lithotripsy;  Surgeon: Marcella Powers MD;  Location: [facility] LAB CV    CV INTRA AORTIC BALLOON N/A 3/23/2023    Procedure: Intraprocedure Aortic Balloon Pump Insertion;  Surgeon: Marcella Powers MD;  Location: [facility] LAB CV    CV LEFT HEART CATH N/A 3/23/2023    Procedure: Left Heart Catheterization;  Surgeon: Marcella Powers MD;  Location: [facility] LAB CV    CV PCI STENT DRUG ELUTING N/A 3/23/2023    Procedure:  Percutaneous Coronary Intervention - Stent;  Surgeon: Marcella Powers MD;  Location: [facility] CV    CV PERICARDIOCENTESIS N/A 3/23/2023    Procedure: Pericardiocentesis;  Surgeon: Marcella Powers MD;  Location: [facility] CV    ESOPHAGOSCOPY, GASTROSCOPY, DUODENOSCOPY (EGD), COMBINED N/A 2018    Procedure: ESOPHAGOGASTRODUODENOSCOPY (EGD) with biopsy and foreign body removal;  Surgeon: Raymond Triana MD;  Location: [facility];  Service: Gastroenterology    HC REMOVAL OF TONSILS,<11 Y/O      Description: Tonsillectomy;  Recorded: 2011;    HYSTERECTOMY      LUIS ANGEL with BSO    HYSTERECTOMY TOTAL ABDOMINAL, BILATERAL SALPINGO-OOPHORECTOMY, COMBINED      KNEE SURGERY Right 2007    Arthroscopic knee surgery    OPEN REDUCTION INTERNAL FIXATION ELBOW      CT ARTHRODESIS ANT INTERBODY MIN DISCECTOMY,LUMBAR      Description: Lumbar Vertebral Fusion;  Recorded: 2011;  Comments: L5-S1 fusion    CT STEREOTACT STIM SPINAL CORD      Description: Spinal Stereotaxis Stimulation Of Cord;  Recorded: 2011;  Comments: implanted 2007 with lead revision     SKIN GRAFT       Family History   Problem Relation Age of Onset    Breast Cancer Mother 74.00    Cerebrovascular Disease Mother     Lung Cancer Father 70.00    Breast Cancer Maternal Aunt 70.00    Liver Cancer Brother 48.00        Social History     Socioeconomic History    Marital status:      Spouse name: Not on file    Number of children: Not on file    Years of education: Not on file    Highest education level: Not on file   Occupational History    Not on file   Tobacco Use    Smoking status: Former     Types: Cigarettes     Quit date: 1970     Years since quittin.4    Smokeless tobacco: Never   Vaping Use    Vaping Use: Never used   Substance and Sexual Activity    Alcohol use: No    Drug use: No    Sexual activity: Not on file   Other Topics Concern    Not on file   Social History Narrative    Lives  at home with her  , gardening , bikes , swims 4-5 miles walk a day        Social Determinants of Health     Financial Resource Strain: Not on file   Food Insecurity: Not on file   Transportation Needs: Not on file   Physical Activity: Not on file   Stress: Not on file   Social Connections: Not on file   Interpersonal Safety: Not on file   Housing Stability: Not on file           Medications  Allergies   Current Outpatient Medications   Medication Sig Dispense Refill    acetaminophen (TYLENOL) 325 MG tablet Take 3 tablets (975 mg) by mouth every 8 hours as needed for mild pain 60 tablet 0    aspirin (ASA) 81 MG chewable tablet Take 1 tablet (81 mg) by mouth daily Starting tomorrow. 30 tablet 2    atorvastatin (LIPITOR) 80 MG tablet Take 1 tablet (80 mg) by mouth daily 90 tablet 2    chlorhexidine (PERIDEX) 0.12 % solution Take 15 mLs by mouth as needed      clopidogrel (PLAVIX) 75 MG tablet Take 1 tablet (75 mg) by mouth daily 90 tablet 3    diclofenac sodium 3 % Gel [DICLOFENAC SODIUM 3 % GEL] 4 g knees three times aday  and 2 g on hands or lebows three times a day 3 Tube 3    estradiol (ESTRACE) 0.1 MG/GM vaginal cream Place 0.5 g vaginally daily 42.5 g 11    GOLIMUMAB (SIMPONI ARIA IV) [GOLIMUMAB (SIMPONI ARIA IV)] Infuse into a venous catheter every 2 (two) months.       hydroxychloroquine (PLAQUENIL) 200 mg tablet [HYDROXYCHLOROQUINE (PLAQUENIL) 200 MG TABLET] Take 400 mg by mouth daily.       Lactobacillus rhamnosus GG (CULTURELLE) 10-15 Billion cell capsule [LACTOBACILLUS RHAMNOSUS GG (CULTURELLE) 10-15 BILLION CELL CAPSULE] Take 1 capsule by mouth daily.      multivitamin therapeutic (THERAGRAN) tablet [MULTIVITAMIN THERAPEUTIC (THERAGRAN) TABLET] Take 1 tablet by mouth daily.      nitroGLYcerin (NITROSTAT) 0.4 MG sublingual tablet For chest pain place 1 tablet under the tongue every 5 minutes for 3 doses. If symptoms persist 5 minutes after 1st dose call 911. 30 tablet 1    omeprazole (PRILOSEC) 40 MG  DR capsule Take 1 capsule (40 mg) by mouth daily 90 capsule 3    sulfaSALAzine ER (AZULFIDINE EN) 500 MG EC tablet Take 1,000 mg by mouth 2 times daily      metoprolol succinate ER (TOPROL XL) 25 MG 24 hr tablet Take 0.5 tablets (12.5 mg) by mouth daily (Patient not taking: Reported on 10/12/2023) 45 tablet 3       Allergies   Allergen Reactions    Codeine Unknown     nausea    Gabapentin Unknown     Fatigue      Pregabalin Unknown     Fatigue            Lab Results    Chemistry/lipid CBC Cardiac Enzymes/BNP/TSH/INR   Recent Labs   Lab Test 02/16/23  1229   CHOL 220*   HDL 96   *   TRIG 80     Recent Labs   Lab Test 02/16/23  1229 09/13/22  1028 06/24/21  0943   * 158* 147*     Recent Labs   Lab Test 02/16/23  1229      POTASSIUM 4.5   CHLORIDE 104   CO2 25   *   BUN 23.9*   CR 0.91   GFRESTIMATED 67   LULU 10.0     Recent Labs   Lab Test 02/16/23  1229 06/24/21  0943   CR 0.91 0.92     Recent Labs   Lab Test 02/16/23  1229 09/13/22  1028 06/24/21  0943   A1C 5.8* 5.8* 5.7*          Recent Labs   Lab Test 06/24/21  0943   WBC 3.6*   HGB 14.0   HCT 42.9           Recent Labs   Lab Test 06/24/21  0943 04/03/18  1422   HGB 14.0 14.0    No results for input(s): TROPONINI in the last 64220 hours.  No results for input(s): BNP, NTBNPI, NTBNP in the last 80653 hours.  Recent Labs   Lab Test 09/13/22  1028   TSH 2.23     No results for input(s): INR in the last 90299 hours.     Germán Trevino,         Date of service 10-12 2023

## 2023-10-12 NOTE — LETTER
10/12/2023    Sarah Beth Carvalho MD  [address] [facility] [address] [facility] [address]    RE: Antonia Everett       Dear Colleague,     I had the pleasure of seeing Antonia Everett in the [facility].    HEART CARE ENCOUNTER CONSULTATON NOTE      [facility]  [phone]      Assessment/Recommendations   Assessment:   1.  Coronary artery disease status percutaneous coronary intervention to the mid RCA (March 23, 2023).  During procedure patient developed coronary perforation and hemopericardium requiring emergent cardiothoracic surgery.  She underwent single-vessel bypass of the right coronary.  Currently doing well.  No active chest pain symptoms  2.  Hyperlipidemia  3.  Rheumatoid arthritis  4.  Fatigue, patient feels related metoprolol    Plan:   1.  Metoprolol has been discontinued due to fatigue symptoms  2.  Aspirin 81 mg daily  3.  Continue Plavix 75 mg daily for 1 year (March 23, 2024)  4.  Continue atorvastatin  5.  Patient continues to walk 4 to 5 miles per day.       History of Present Illness/Subjective    HPI: Antonia Everett is a 73 year old female with coronary artery disease status post single-vessel coronary bypass surgery.    Patient had complex course during her coronary angiogram in March 2023.  Patient had complex coronary lesion in her right coronary artery.  During percutaneous coronary intervention, she experienced a coronary perforation which resulted in hemopericardium and cardiac tamponade.  She underwent emergent pericardial effusion to stabilize her condition along with balloon pump.  She is emergently underwent surgery to stabilize bleeding.  Dr. Zamorano performed cardiothoracic surgery which stabilized bleeding at her right coronary artery site and at the junction of the right atrium and right coronary artery.  She underwent single-vessel coronary bypass surgery of the right coronary as well. Patient has made a complete recovery.  She is completed cardiac  rehab.  She is currently walking 4-5 miles per day.     She stopped her beta-blocker therapy due to fatigue which has somewhat helped her overall symptoms.  No active bleeding issues to dual antiplatelet therapy.  We will check hemoglobin level given some level of fatigue.      Reviewed discharge summary from cardiothoracic surgery.  Reviewed follow-up in cardiothoracic surgery.  Reviewed Dr. Zamorano's operating note.     Physical Examination  Review of Systems   Vitals: BP 98/58 (BP Location: Left arm, Patient Position: Sitting, Cuff Size: Adult Regular)   Pulse 86   Resp 20   Wt 77.4 kg (170 lb 9.6 oz)   SpO2 94%   BMI 25.94 kg/m    BMI= Body mass index is 25.94 kg/m .  Wt Readings from Last 3 Encounters:   10/12/23 77.4 kg (170 lb 9.6 oz)   06/29/23 77.2 kg (170 lb 1.6 oz)   04/25/23 77.1 kg (170 lb)        Pleasant   ENT/Mouth: membranes moist, no oral lesions or bleeding gums.      EYES:  no scleral icterus, normal conjunctivae       Chest/Lungs:   lungs are clear to auscultation, no rales or wheezing, healed sternal scar, equal chest wall expansion    Cardiovascular:   Regular. Normal first and second heart sounds with no murmurs, rubs, or gallops; the carotid, radial and posterior tibial pulses are intact, Jugular venous pressure normal, no edema bilaterally        Extremities: no cyanosis or clubbing   Skin: no xanthelasma, warm.    Neurologic: normal  bilateral, no tremors     Psychiatric: alert and oriented x3, calm        Please refer above for cardiac ROS details.        Medical History  Surgical History Family History Social History   Past Medical History:   Diagnosis Date    Bleeding duodenal ulcer 2010    Chronic diarrhea 04/20/2017    Colonoscopy normal except inflammatory polyp or    Chronic left shoulder pain 9/26/2017    Chronic low back pain without sciatica 11/3/2017    Chronic pain     Chronic radicular low back pain     Pain clinic.  Nerve stimulator placement    Complete tear of left  rotator cuff 4/3/2018    Duodenal ulcer     bleeding    Family history of breast cancer     GERD (gastroesophageal reflux disease)     H/O ulcer disease     secondary to medications    Herpes zoster 2010    History of transfusion     Hypercholesterolemia     LA (lupus anticoagulant) disorder (H24)     Left hip pain 2015    bursitis, evaluated by orthopedics    Lichen sclerosus of female genitalia 6/16/2020    MRSA (methicillin resistant staph aureus) culture positive     betweeen 1996 and 2000    Oral lichen planus 4/20/2017    Plantar fasciitis     Raynaud's disease without gangrene 11/20/2018    Involving both feet, describing burning sensation, redness and coolness to touch    Rheumatoid arthritis (H)     Followed by rheumatology, Dr. Felipe,     Screening     DEXA normal T score -0.9 2016, minimal change compared to previous DEXA    VRE (vancomycin resistant enterococcus) culture positive     between 1996 and 2000     Past Surgical History:   Procedure Laterality Date    ABDOMINAL HERNIA REPAIR      APPENDECTOMY      ARTHROSCOPY SHOULDER ROTATOR CUFF REPAIR Right     ARTHROSCOPY SHOULDER ROTATOR CUFF REPAIR Left 04/2018    BACK SURGERY      BIOPSY BREAST Left     BYPASS GRAFT ARTERY CORONARY N/A 3/23/2023    Procedure: CORONARY ARTERY BYPASS GRAFT X1 (CABG), EPIAORTIC ULTRASOUND, ENDOVEIN HARVEST FROM LEFT LOWER EXTREMITY;  Surgeon: Yessenia Zamorano MD;  Location: [facility] OR    COLONOSCOPY      Normal colonoscopy May 2017 except inflammatory polyp    CV CORONARY ANGIOGRAM N/A 3/23/2023    Procedure: Coronary Angiogram;  Surgeon: Marcella Powers MD;  Location: [facility] CV    CV CORONARY LITHOTRIPSY PCI N/A 3/23/2023    Procedure: Percutaneous Coronary Intervention - Lithotripsy;  Surgeon: Marcella Powers MD;  Location: [facility] LAB CV    CV INTRA AORTIC BALLOON N/A 3/23/2023    Procedure: Intraprocedure Aortic Balloon Pump Insertion;  Surgeon: Marcella Powers MD;  Location: [facility]  CATH LAB CV    CV LEFT HEART CATH N/A 3/23/2023    Procedure: Left Heart Catheterization;  Surgeon: Marcella Powers MD;  Location: [facility]    CV PCI STENT DRUG ELUTING N/A 3/23/2023    Procedure: Percutaneous Coronary Intervention - Stent;  Surgeon: Marcella Powers MD;  Location: [facility] CV    CV PERICARDIOCENTESIS N/A 3/23/2023    Procedure: Pericardiocentesis;  Surgeon: Marcella Powers MD;  Location: [facility]    ESOPHAGOSCOPY, GASTROSCOPY, DUODENOSCOPY (EGD), COMBINED N/A 2018    Procedure: ESOPHAGOGASTRODUODENOSCOPY (EGD) with biopsy and foreign body removal;  Surgeon: Raymond Triana MD;  Location: [facility];  Service: Gastroenterology    HC REMOVAL OF TONSILS,<13 Y/O      Description: Tonsillectomy;  Recorded: 2011;    HYSTERECTOMY      LUIS ANGEL with BSO    HYSTERECTOMY TOTAL ABDOMINAL, BILATERAL SALPINGO-OOPHORECTOMY, COMBINED      KNEE SURGERY Right 2007    Arthroscopic knee surgery    OPEN REDUCTION INTERNAL FIXATION ELBOW  2014    MS ARTHRODESIS ANT INTERBODY MIN DISCECTOMY,LUMBAR      Description: Lumbar Vertebral Fusion;  Recorded: 2011;  Comments: L5-S1 fusion    MS STEREOTACT STIM SPINAL CORD      Description: Spinal Stereotaxis Stimulation Of Cord;  Recorded: 2011;  Comments: implanted 2007 with lead revision     SKIN GRAFT       Family History   Problem Relation Age of Onset    Breast Cancer Mother 74.00    Cerebrovascular Disease Mother     Lung Cancer Father 70.00    Breast Cancer Maternal Aunt 70.00    Liver Cancer Brother 48.00        Social History     Socioeconomic History    Marital status:      Spouse name: Not on file    Number of children: Not on file    Years of education: Not on file    Highest education level: Not on file   Occupational History    Not on file   Tobacco Use    Smoking status: Former     Types: Cigarettes     Quit date: 1970     Years since quittin.4    Smokeless tobacco: Never    Vaping Use    Vaping Use: Never used   Substance and Sexual Activity    Alcohol use: No    Drug use: No    Sexual activity: Not on file   Other Topics Concern    Not on file   Social History Narrative    Lives at home with her  , gardening , bikes , swims 4-5 miles walk a day        Social Determinants of Health     Financial Resource Strain: Not on file   Food Insecurity: Not on file   Transportation Needs: Not on file   Physical Activity: Not on file   Stress: Not on file   Social Connections: Not on file   Interpersonal Safety: Not on file   Housing Stability: Not on file           Medications  Allergies   Current Outpatient Medications   Medication Sig Dispense Refill    acetaminophen (TYLENOL) 325 MG tablet Take 3 tablets (975 mg) by mouth every 8 hours as needed for mild pain 60 tablet 0    aspirin (ASA) 81 MG chewable tablet Take 1 tablet (81 mg) by mouth daily Starting tomorrow. 30 tablet 2    atorvastatin (LIPITOR) 80 MG tablet Take 1 tablet (80 mg) by mouth daily 90 tablet 2    chlorhexidine (PERIDEX) 0.12 % solution Take 15 mLs by mouth as needed      clopidogrel (PLAVIX) 75 MG tablet Take 1 tablet (75 mg) by mouth daily 90 tablet 3    diclofenac sodium 3 % Gel [DICLOFENAC SODIUM 3 % GEL] 4 g knees three times aday  and 2 g on hands or lebows three times a day 3 Tube 3    estradiol (ESTRACE) 0.1 MG/GM vaginal cream Place 0.5 g vaginally daily 42.5 g 11    GOLIMUMAB (SIMPONI ARIA IV) [GOLIMUMAB (SIMPONI ARIA IV)] Infuse into a venous catheter every 2 (two) months.       hydroxychloroquine (PLAQUENIL) 200 mg tablet [HYDROXYCHLOROQUINE (PLAQUENIL) 200 MG TABLET] Take 400 mg by mouth daily.       Lactobacillus rhamnosus GG (CULTURELLE) 10-15 Billion cell capsule [LACTOBACILLUS RHAMNOSUS GG (CULTURELLE) 10-15 BILLION CELL CAPSULE] Take 1 capsule by mouth daily.      multivitamin therapeutic (THERAGRAN) tablet [MULTIVITAMIN THERAPEUTIC (THERAGRAN) TABLET] Take 1 tablet by mouth daily.       nitroGLYcerin (NITROSTAT) 0.4 MG sublingual tablet For chest pain place 1 tablet under the tongue every 5 minutes for 3 doses. If symptoms persist 5 minutes after 1st dose call 911. 30 tablet 1    omeprazole (PRILOSEC) 40 MG DR capsule Take 1 capsule (40 mg) by mouth daily 90 capsule 3    sulfaSALAzine ER (AZULFIDINE EN) 500 MG EC tablet Take 1,000 mg by mouth 2 times daily      metoprolol succinate ER (TOPROL XL) 25 MG 24 hr tablet Take 0.5 tablets (12.5 mg) by mouth daily (Patient not taking: Reported on 10/12/2023) 45 tablet 3       Allergies   Allergen Reactions    Codeine Unknown     nausea    Gabapentin Unknown     Fatigue      Pregabalin Unknown     Fatigue            Lab Results    Chemistry/lipid CBC Cardiac Enzymes/BNP/TSH/INR   Recent Labs   Lab Test 02/16/23  1229   CHOL 220*   HDL 96   *   TRIG 80     Recent Labs   Lab Test 02/16/23  1229 09/13/22  1028 06/24/21  0943   * 158* 147*     Recent Labs   Lab Test 02/16/23  1229      POTASSIUM 4.5   CHLORIDE 104   CO2 25   *   BUN 23.9*   CR 0.91   GFRESTIMATED 67   LULU 10.0     Recent Labs   Lab Test 02/16/23  1229 06/24/21  0943   CR 0.91 0.92     Recent Labs   Lab Test 02/16/23  1229 09/13/22  1028 06/24/21  0943   A1C 5.8* 5.8* 5.7*          Recent Labs   Lab Test 06/24/21  0943   WBC 3.6*   HGB 14.0   HCT 42.9           Recent Labs   Lab Test 06/24/21  0943 04/03/18  1422   HGB 14.0 14.0    No results for input(s): TROPONINI in the last 66304 hours.  No results for input(s): BNP, NTBNPI, NTBNP in the last 67581 hours.  Recent Labs   Lab Test 09/13/22  1028   TSH 2.23     No results for input(s): INR in the last 31858 hours.     Germán Trevino DO        Date of service 10-12 2023      Thank you for allowing me to participate in the care of your patient.      Sincerely,     Germán Trevino DO     [facility]  cc:   Germán Trevino DO  [address] [place] [address]

## 2023-10-13 DIAGNOSIS — D64.9 ANEMIA, UNSPECIFIED TYPE: Primary | ICD-10-CM

## 2023-10-13 RX ORDER — FERROUS SULFATE 325(65) MG
325 TABLET ORAL
Qty: 30 TABLET | Refills: 0 | Status: SHIPPED | OUTPATIENT
Start: 2023-10-13 | End: 2023-10-19

## 2023-10-13 NOTE — RESULT ENCOUNTER NOTE
Annalise,     Can we inform patient that she has iron def anemia.  Ferritin 11.  With Hgb: 9.  I would recommend follow-up with PCP and start ferrous sulfate 325 mg daily.

## 2023-10-19 ENCOUNTER — OFFICE VISIT (OUTPATIENT)
Dept: INTERNAL MEDICINE | Facility: CLINIC | Age: 73
End: 2023-10-19
Payer: MEDICARE

## 2023-10-19 VITALS
BODY MASS INDEX: 25.31 KG/M2 | TEMPERATURE: 97.9 F | HEIGHT: 68 IN | SYSTOLIC BLOOD PRESSURE: 100 MMHG | OXYGEN SATURATION: 100 % | RESPIRATION RATE: 16 BRPM | DIASTOLIC BLOOD PRESSURE: 60 MMHG | WEIGHT: 167 LBS | HEART RATE: 88 BPM

## 2023-10-19 DIAGNOSIS — D68.59 PRIMARY HYPERCOAGULABLE STATE (H): ICD-10-CM

## 2023-10-19 DIAGNOSIS — K21.9 GASTROESOPHAGEAL REFLUX DISEASE WITHOUT ESOPHAGITIS: ICD-10-CM

## 2023-10-19 DIAGNOSIS — M54.16 LUMBAR RADICULOPATHY: ICD-10-CM

## 2023-10-19 DIAGNOSIS — Z29.11 NEED FOR VACCINATION AGAINST RESPIRATORY SYNCYTIAL VIRUS: ICD-10-CM

## 2023-10-19 DIAGNOSIS — E78.00 HYPERCHOLESTEROLEMIA: ICD-10-CM

## 2023-10-19 DIAGNOSIS — D64.9 ANEMIA, UNSPECIFIED TYPE: ICD-10-CM

## 2023-10-19 DIAGNOSIS — D50.9 IRON DEFICIENCY ANEMIA, UNSPECIFIED IRON DEFICIENCY ANEMIA TYPE: ICD-10-CM

## 2023-10-19 DIAGNOSIS — M06.9 RHEUMATOID ARTHRITIS, INVOLVING UNSPECIFIED SITE, UNSPECIFIED WHETHER RHEUMATOID FACTOR PRESENT (H): ICD-10-CM

## 2023-10-19 DIAGNOSIS — I25.810 CORONARY ARTERY DISEASE INVOLVING AUTOLOGOUS ARTERY CORONARY BYPASS GRAFT WITHOUT ANGINA PECTORIS: ICD-10-CM

## 2023-10-19 DIAGNOSIS — Z00.00 HEALTHCARE MAINTENANCE: ICD-10-CM

## 2023-10-19 DIAGNOSIS — Z00.00 ENCOUNTER FOR MEDICARE ANNUAL WELLNESS EXAM: Primary | ICD-10-CM

## 2023-10-19 LAB
CHOLEST SERPL-MCNC: 170 MG/DL
FOLATE SERPL-MCNC: >40 NG/ML (ref 4.6–34.8)
HBA1C MFR BLD: 6 % (ref 0–5.6)
HDLC SERPL-MCNC: 82 MG/DL
LDLC SERPL CALC-MCNC: 69 MG/DL
NONHDLC SERPL-MCNC: 88 MG/DL
TRIGL SERPL-MCNC: 95 MG/DL
TSH SERPL DL<=0.005 MIU/L-ACNC: 1.92 UIU/ML (ref 0.3–4.2)
VIT D+METAB SERPL-MCNC: 45 NG/ML (ref 20–50)

## 2023-10-19 PROCEDURE — 99214 OFFICE O/P EST MOD 30 MIN: CPT | Mod: 25 | Performed by: INTERNAL MEDICINE

## 2023-10-19 PROCEDURE — G0439 PPPS, SUBSEQ VISIT: HCPCS | Performed by: INTERNAL MEDICINE

## 2023-10-19 PROCEDURE — 84443 ASSAY THYROID STIM HORMONE: CPT | Performed by: INTERNAL MEDICINE

## 2023-10-19 PROCEDURE — 83036 HEMOGLOBIN GLYCOSYLATED A1C: CPT | Performed by: INTERNAL MEDICINE

## 2023-10-19 PROCEDURE — 83921 ORGANIC ACID SINGLE QUANT: CPT | Performed by: INTERNAL MEDICINE

## 2023-10-19 PROCEDURE — 82746 ASSAY OF FOLIC ACID SERUM: CPT | Performed by: INTERNAL MEDICINE

## 2023-10-19 PROCEDURE — 36415 COLL VENOUS BLD VENIPUNCTURE: CPT | Performed by: INTERNAL MEDICINE

## 2023-10-19 PROCEDURE — 80061 LIPID PANEL: CPT | Performed by: INTERNAL MEDICINE

## 2023-10-19 PROCEDURE — 82306 VITAMIN D 25 HYDROXY: CPT | Mod: GZ | Performed by: INTERNAL MEDICINE

## 2023-10-19 RX ORDER — FERROUS SULFATE 325(65) MG
325 TABLET ORAL
Qty: 90 TABLET | Refills: 2 | Status: SHIPPED | OUTPATIENT
Start: 2023-10-19 | End: 2024-09-17

## 2023-10-19 RX ORDER — RESPIRATORY SYNCYTIAL VIRUS VACCINE 120MCG/0.5
0.5 KIT INTRAMUSCULAR ONCE
Qty: 1 EACH | Refills: 0 | Status: SHIPPED | OUTPATIENT
Start: 2023-10-19 | End: 2023-10-19

## 2023-10-19 ASSESSMENT — ENCOUNTER SYMPTOMS
HEADACHES: 0
SHORTNESS OF BREATH: 0
NAUSEA: 0
CHILLS: 0
CONSTIPATION: 0
WEAKNESS: 0
COUGH: 0
HEARTBURN: 0
DIARRHEA: 0
ABDOMINAL PAIN: 0
PARESTHESIAS: 0
FREQUENCY: 0
HEMATOCHEZIA: 0
DIZZINESS: 0
MYALGIAS: 0
EYE PAIN: 0
PALPITATIONS: 0
SORE THROAT: 0
HEMATURIA: 0
NERVOUS/ANXIOUS: 0
ARTHRALGIAS: 1
FEVER: 0
DYSURIA: 0
JOINT SWELLING: 1
BREAST MASS: 0

## 2023-10-19 ASSESSMENT — ACTIVITIES OF DAILY LIVING (ADL): CURRENT_FUNCTION: NO ASSISTANCE NEEDED

## 2023-10-19 NOTE — PROGRESS NOTES
"SUBJECTIVE:   Erika is a 73 year old who presents for Preventive Visit.      10/19/2023     8:17 AM   Additional Questions   Roomed by Estephania       Are you in the first 12 months of your Medicare coverage?  No    Healthy Habits:     In general, how would you rate your overall health?  Good    Frequency of exercise:  6-7 days/week    Duration of exercise:  45-60 minutes    Do you usually eat at least 4 servings of fruit and vegetables a day, include whole grains    & fiber and avoid regularly eating high fat or \"junk\" foods?  Yes    Taking medications regularly:  Yes    Ability to successfully perform activities of daily living:  No assistance needed    Home Safety:  Lack of grab bars in the bathroom    Hearing Impairment:  Difficulty following a conversation in a noisy restaurant or crowded room    In the past 6 months, have you been bothered by leaking of urine?  No    In general, how would you rate your overall mental or emotional health?  Excellent    Additional concerns today:  No      Today's PHQ-2 Score:       10/19/2023     8:14 AM   PHQ-2 ( 1999 [employer])   Q1: Little interest or pleasure in doing things 0   Q2: Feeling down, depressed or hopeless 0   PHQ-2 Score 0   Q1: Little interest or pleasure in doing things Not at all   Q2: Feeling down, depressed or hopeless Not at all   PHQ-2 Score 0           Have you ever done Advance Care Planning? (For example, a Health Directive, POLST, or a discussion with a medical provider or your loved ones about your wishes): Yes, patient states has an Advance Care Planning document and will bring a copy to the clinic.       Fall risk  Fallen 2 or more times in the past year?: No  Any fall with injury in the past year?: No    Cognitive Screening   1) Repeat 3 items (Leader, Season, Table)    2) Clock draw: NORMAL  3) 3 item recall: Recalls 3 objects  Results:  NORMAL CLOCK     Mini-CogTM Copyright S Jaziel. Licensed by the author for use in [facility]; " reprinted with permission ([email or web address]). All rights reserved.      Do you have sleep apnea, excessive snoring or daytime drowsiness? : no    Reviewed and updated as needed this visit by clinical staff   Tobacco  Allergies  Meds              Reviewed and updated as needed this visit by Provider                 Social History     Tobacco Use    Smoking status: Former     Types: Cigarettes     Quit date: 1970     Years since quittin.4    Smokeless tobacco: Never   Substance Use Topics    Alcohol use: No             10/12/2023    12:33 PM   Alcohol Use   Prescreen: >3 drinks/day or >7 drinks/week? No     Do you have a current opioid prescription? No  Do you use any other controlled substances or medications that are not prescribed by a provider? None        Current providers sharing in care for this patient include:   Patient Care Team:  Sarah Beth Carvalho MD as PCP - General (Internal Medicine)  Sarah Beth Carvalho MD as Assigned PCP  Quincy Case MD as MD (Cardiovascular Disease)  Germán Trevino DO as Assigned Heart and Vascular Provider    The following health maintenance items are reviewed in Epic and correct as of today:  Health Maintenance   Topic Date Due    RSV VACCINE 60+ (1 - 1-dose 60+ series) Never done    MEDICARE ANNUAL WELLNESS VISIT  2023    ANNUAL REVIEW OF HM ORDERS  2023    COVID-19 Vaccine (2023- season) 2023    FALL RISK ASSESSMENT  10/19/2024    MAMMO SCREENING  2025    ADVANCE CARE PLANNING  2027    COLORECTAL CANCER SCREENING  2027    LIPID  2028    DTAP/TDAP/TD IMMUNIZATION (3 - Td or Tdap) 2030    DEXA  2036    HEPATITIS C SCREENING  Completed    PHQ-2 (once per calendar year)  Completed    INFLUENZA VACCINE  Completed    Pneumococcal Vaccine: 65+ Years  Completed    ZOSTER IMMUNIZATION  Completed    IPV IMMUNIZATION  Aged Out    HPV IMMUNIZATION  Aged Out    MENINGITIS IMMUNIZATION  Aged Out               Review of  "Systems   Constitutional:  Negative for chills and fever.   HENT:  Negative for congestion, ear pain, hearing loss and sore throat.    Eyes:  Negative for pain and visual disturbance.   Respiratory:  Negative for cough and shortness of breath.    Cardiovascular:  Negative for chest pain, palpitations and peripheral edema.   Gastrointestinal:  Negative for abdominal pain, constipation, diarrhea, heartburn, hematochezia and nausea.   Breasts:  Negative for tenderness, breast mass and discharge.   Genitourinary:  Negative for dysuria, frequency, genital sores, hematuria, pelvic pain, urgency, vaginal bleeding and vaginal discharge.   Musculoskeletal:  Positive for arthralgias and joint swelling. Negative for myalgias.   Skin:  Negative for rash.   Neurological:  Negative for dizziness, weakness, headaches and paresthesias.   Psychiatric/Behavioral:  Negative for mood changes. The patient is not nervous/anxious.      Current Outpatient Medications   Medication    acetaminophen (TYLENOL) 325 MG tablet    aspirin (ASA) 81 MG chewable tablet    atorvastatin (LIPITOR) 80 MG tablet    chlorhexidine (PERIDEX) 0.12 % solution    clopidogrel (PLAVIX) 75 MG tablet    diclofenac sodium 3 % Gel    estradiol (ESTRACE) 0.1 MG/GM vaginal cream    ferrous sulfate (FEROSUL) 325 (65 Fe) MG tablet    GOLIMUMAB (SIMPONI ARIA IV)    hydroxychloroquine (PLAQUENIL) 200 mg tablet    Lactobacillus rhamnosus GG (CULTURELLE) 10-15 Billion cell capsule    multivitamin therapeutic (THERAGRAN) tablet    nitroGLYcerin (NITROSTAT) 0.4 MG sublingual tablet    omeprazole (PRILOSEC) 40 MG DR capsule    sulfaSALAzine ER (AZULFIDINE EN) 500 MG EC tablet     No current facility-administered medications for this visit.         OBJECTIVE:   /60 (BP Location: Left arm, Patient Position: Sitting, Cuff Size: Adult Regular)   Pulse 88   Temp 97.9  F (36.6  C) (Tympanic)   Resp 16   Ht 1.715 m (5' 7.5\")   Wt 75.8 kg (167 lb)   LMP  (LMP Unknown)   " "SpO2 100%   BMI 25.77 kg/m   Estimated body mass index is 25.77 kg/m  as calculated from the following:    Height as of this encounter: 1.715 m (5' 7.5\").    Weight as of this encounter: 75.8 kg (167 lb).  Physical Exam  GENERAL: healthy, alert and no distress  EYES: Eyes grossly normal to inspection, PERRL and conjunctivae and sclerae normal  HENT: ear canals and TM's normal, nose and mouth without ulcers or lesions  NECK: no adenopathy, no asymmetry, masses, or scars and thyroid normal to palpation  RESP: lungs clear to auscultation - no rales, rhonchi or wheezes  BREAST: normal without masses, tenderness or nipple discharge and no palpable axillary masses or adenopathy  CV: regular rate and rhythm, normal S1 S2, no S3 or S4, no murmur, click or rub, no peripheral edema and peripheral pulses strong  ABDOMEN: soft, nontender, no hepatosplenomegaly, no masses and bowel sounds normal  MS: no gross musculoskeletal defects noted, no edema  SKIN: no suspicious lesions or rashes  NEURO: Normal strength and tone, mentation intact and speech normal  PSYCH: mentation appears normal, affect normal/bright        ASSESSMENT / PLAN:   (Z00.00) Encounter for Medicare annual wellness exam  (primary encounter diagnosis)  Comment:   Plan:     (Z29.11) Need for vaccination against respiratory syncytial virus  Comment:   Plan: respiratory syncytial virus vaccine, bivalent         (ABRYSVO) injection            (D64.9) Anemia, unspecified type  Comment:   Plan: ferrous sulfate (FEROSUL) 325 (65 Fe) MG         tablet, Methylmalonic Acid        She had been noted to have anemia postoperative at her last visit and I had sent her a [employer] message telling her to take iron but and that she had apparently read but she did not actually see if they remember reading.  At any rate she never took iron and just took it now after cardiology also noted her hemoglobin was low we will recheck after 3 months of taking iron if hemoglobin normalizes we " "will continue to monitor if it does not she may need work-up for GI loss.  She had a recent colonoscopy however so would like to defer that until absolutely necessary    (D68.59) Lupus Anticoagulant  Comment:   Plan:     (M06.9) Rheumatoid arthritis, involving unspecified site, unspecified whether rheumatoid factor present (H)  Comment:   Plan: She is on Simponi and currently it is quiesced sent she is also taking Plaquenil    (M54.16) Lumbar radiculopathy  Comment:   Plan:     (E78.00) Hypercholesterolemia  Comment: On high-dose statin  LDL Cholesterol Calculated   Date Value Ref Range Status   10/19/2023 69 <=100 mg/dL Final       Plan:     (K21.9) Gastroesophageal reflux disease without esophagitis  Comment:   Plan:     (Z00.00) Healthcare maintenance  Comment:   Plan: Colon 2017 next ten years  Annual mammo   dexa ordered ( last normal 2016)   Walks every dday     (I25.810) Coronary artery disease involving autologous artery coronary bypass graft without angina pectoris  Comment:   Plan: coronary artery disease status percutaneous coronary intervention to the mid RCA (March 23, 2023).  During procedure patient developed coronary perforation and hemopericardium requiring emergent cardiothoracic surgery.  She underwent single-vessel bypass of the right coronary.  Currently doing well.  No active chest pain symptoms     (D50.9) Iron deficiency anemia, unspecified iron deficiency anemia type  Comment:   Plan: Folate, Vitamin D Deficiency, TSH, Lipid panel         reflex to direct LDL Fasting, Hemoglobin A1c              o    Creatinine   Date Value Ref Range Status   10/12/2023 0.94 0.51 - 0.95 mg/dL Final      COUNSELING:  Reviewed preventive health counseling, as reflected in patient instructions      BMI:   Estimated body mass index is 25.77 kg/m  as calculated from the following:    Height as of this encounter: 1.715 m (5' 7.5\").    Weight as of this encounter: 75.8 kg (167 lb).         She reports that she quit " smoking about 53 years ago. Her smoking use included cigarettes. She has never used smokeless tobacco.      Appropriate preventive services were discussed with this patient, including applicable screening as appropriate for fall prevention, nutrition, physical activity, Tobacco-use cessation, weight loss and cognition.  Checklist reviewing preventive services available has been given to the patient.    Reviewed patients plan of care and provided an AVS. The Basic Care Plan (routine screening as documented in Health Maintenance) for Antonia meets the Care Plan requirement. This Care Plan has been established and reviewed with the Patient.          Sarah Beth Carvalho MD  [facility]    Identified Health Risks:

## 2023-10-19 NOTE — PATIENT INSTRUCTIONS
Patient Education   Personalized Prevention Plan  You are due for the preventive services outlined below.  Your care team is available to assist you in scheduling these services.  If you have already completed any of these items, please share that information with your care team to update in your medical record.  Health Maintenance Due   Topic Date Due     RSV VACCINE 60+ (1 - 1-dose 60+ series) Never done     Annual Wellness Visit  09/13/2023     ANNUAL REVIEW OF HM ORDERS  09/13/2023     Hearing Loss: Care Instructions  Overview     Hearing loss is a sudden or slow decrease in how well you hear. It can range from slight to profound. Permanent hearing loss can occur with aging. It also can happen when you are exposed long-term to loud noise. Examples include listening to loud music, riding motorcycles, or being around other loud machines.  Hearing loss can affect your work and home life. It can make you feel lonely or depressed. You may feel that you have lost your independence. But hearing aids and other devices can help you hear better and feel connected to others.  Follow-up care is a key part of your treatment and safety. Be sure to make and go to all appointments, and call your doctor if you are having problems. It's also a good idea to know your test results and keep a list of the medicines you take.  How can you care for yourself at home?  Avoid loud noises whenever possible. This helps keep your hearing from getting worse.  Always wear hearing protection around loud noises.  Wear a hearing aid as directed.  A professional can help you pick a hearing aid that will work best for you.  You can also get hearing aids over the counter for mild to moderate hearing loss.  Have hearing tests as your doctor suggests. They can show whether your hearing has changed. Your hearing aid may need to be adjusted.  Use other devices as needed. These may include:  Telephone amplifiers and hearing aids that can connect to a  "television, stereo, radio, or microphone.  Devices that use lights or vibrations. These alert you to the doorbell, a ringing telephone, or a baby monitor.  Television closed-captioning. This shows the words at the bottom of the screen. Most new TVs can do this.  TTY (text telephone). This lets you type messages back and forth on the telephone instead of talking or listening. These devices are also called TDD. When messages are typed on the keyboard, they are sent over the phone line to a receiving TTY. The message is shown on a monitor.  Use text messaging, social media, and email if it is hard for you to communicate by telephone.  Try to learn a listening technique called speechreading. It is not lipreading. You pay attention to people's gestures, expressions, posture, and tone of voice. These clues can help you understand what a person is saying. Face the person you are talking to, and have them face you. Make sure the lighting is good. You need to see the other person's face clearly.  Think about counseling if you need help to adjust to your hearing loss.  When should you call for help?  Watch closely for changes in your health, and be sure to contact your doctor if:    You think your hearing is getting worse.     You have new symptoms, such as dizziness or nausea.   Where can you learn more?  Go to https://www.[employer].net/patiented  Enter R798 in the search box to learn more about \"Hearing Loss: Care Instructions.\"  Current as of: March 1, 2023               Content Version: 13.7    6423-6474 [employer].   Care instructions adapted under license by your healthcare professional. If you have questions about a medical condition or this instruction, always ask your healthcare professional. [employer], [employer] disclaims any warranty or liability for your use of this information.         "

## 2023-10-24 LAB — METHYLMALONATE SERPL-SCNC: 0.25 UMOL/L (ref 0–0.4)

## 2023-12-04 ENCOUNTER — TRANSFERRED RECORDS (OUTPATIENT)
Dept: HEALTH INFORMATION MANAGEMENT | Facility: CLINIC | Age: 73
End: 2023-12-04
Payer: MEDICARE

## 2023-12-04 ENCOUNTER — TELEPHONE (OUTPATIENT)
Dept: INTERNAL MEDICINE | Facility: CLINIC | Age: 73
End: 2023-12-04
Payer: MEDICARE

## 2023-12-04 LAB
ALT SERPL-CCNC: 18 IU/L (ref 5–35)
AST SERPL-CCNC: 35 U/L (ref 5–34)
CREATININE (EXTERNAL): 0.88 MG/DL (ref 0.5–1.3)

## 2023-12-04 NOTE — TELEPHONE ENCOUNTER
December 4, 2023    Outside records received from [facility].  Records were placed in the inbox for Dr. Carvalho to review.  A copy was sent to HIM to be scanned into the patient's chart.    Tila Tsang

## 2024-02-23 DIAGNOSIS — Z95.1 S/P CABG (CORONARY ARTERY BYPASS GRAFT): ICD-10-CM

## 2024-02-23 RX ORDER — ATORVASTATIN CALCIUM 80 MG/1
80 TABLET, FILM COATED ORAL DAILY
Qty: 90 TABLET | Refills: 1 | Status: SHIPPED | OUTPATIENT
Start: 2024-02-23 | End: 2024-08-09

## 2024-02-27 ENCOUNTER — TRANSFERRED RECORDS (OUTPATIENT)
Dept: HEALTH INFORMATION MANAGEMENT | Facility: CLINIC | Age: 74
End: 2024-02-27
Payer: MEDICARE

## 2024-02-27 DIAGNOSIS — N95.2 VAGINAL ATROPHY: ICD-10-CM

## 2024-02-27 DIAGNOSIS — Z95.1 S/P CABG (CORONARY ARTERY BYPASS GRAFT): ICD-10-CM

## 2024-02-27 LAB
ALT SERPL-CCNC: 18 IU/L (ref 5–35)
AST SERPL-CCNC: 33 U/L (ref 5–34)
CREATININE (EXTERNAL): 0.91 MG/DL (ref 0.5–1.3)

## 2024-02-27 RX ORDER — ESTRADIOL 0.1 MG/G
0.5 CREAM VAGINAL DAILY
Qty: 42.5 G | Refills: 1 | Status: SHIPPED | OUTPATIENT
Start: 2024-02-27 | End: 2024-08-05

## 2024-02-27 RX ORDER — ATORVASTATIN CALCIUM 80 MG/1
80 TABLET, FILM COATED ORAL DAILY
Qty: 90 TABLET | Refills: 1 | Status: CANCELLED | OUTPATIENT
Start: 2024-02-27

## 2024-02-28 ENCOUNTER — TELEPHONE (OUTPATIENT)
Dept: INTERNAL MEDICINE | Facility: CLINIC | Age: 74
End: 2024-02-28
Payer: MEDICARE

## 2024-02-28 NOTE — TELEPHONE ENCOUNTER
February 28, 2024    Outside records received from [facility] results 02/27/2024.  Records were placed in the inbox for Dr. Carvalho to review.  A copy was sent to HIM to be scanned into the patient's chart.    Tila Tsang

## 2024-03-15 ENCOUNTER — OFFICE VISIT (OUTPATIENT)
Dept: CARDIOLOGY | Facility: CLINIC | Age: 74
End: 2024-03-15
Attending: INTERNAL MEDICINE
Payer: MEDICARE

## 2024-03-15 VITALS
BODY MASS INDEX: 26.37 KG/M2 | HEART RATE: 75 BPM | RESPIRATION RATE: 20 BRPM | SYSTOLIC BLOOD PRESSURE: 126 MMHG | DIASTOLIC BLOOD PRESSURE: 70 MMHG | WEIGHT: 170.9 LBS

## 2024-03-15 DIAGNOSIS — E78.00 HYPERCHOLESTEROLEMIA: ICD-10-CM

## 2024-03-15 DIAGNOSIS — R06.02 EXERTIONAL SHORTNESS OF BREATH: ICD-10-CM

## 2024-03-15 DIAGNOSIS — I21.9: ICD-10-CM

## 2024-03-15 DIAGNOSIS — I25.10 CORONARY ARTERY DISEASE INVOLVING NATIVE HEART WITHOUT ANGINA PECTORIS, UNSPECIFIED VESSEL OR LESION TYPE: Primary | ICD-10-CM

## 2024-03-15 PROCEDURE — 99214 OFFICE O/P EST MOD 30 MIN: CPT | Performed by: INTERNAL MEDICINE

## 2024-03-15 RX ORDER — ASPIRIN 81 MG/1
162 TABLET, CHEWABLE ORAL DAILY
COMMUNITY
Start: 2024-03-15

## 2024-03-15 NOTE — PROGRESS NOTES
HEART CARE ENCOUNTER CONSULTATON NOTE      [facility]  [phone]      Assessment/Recommendations   Assessment:   1.  Coronary artery disease status percutaneous coronary intervention to the mid RCA (March 23, 2023).  During procedure patient developed coronary perforation of RCA and hemopericardium requiring emergent cardiothoracic surgery.  She underwent single-vessel bypass of the right coronary.  Currently doing well.  No active chest pain symptoms  2.  Hyperlipidemia  3.  Rheumatoid arthritis  4.  Dyspnea on exertion    Plan:   1.  Treadmill echo stress test given dyspnea on exertion and reduction in VO2 on her Apple Watch.  2.  Increase aspirin to 162 mg daily  3.  Discontinue LDL 69 at goal Plavix 75 mg daily   4.  Continue atorvastatin,   5.  Patient continues to walk 4 miles per day.       History of Present Illness/Subjective    HPI: Antonia Everett is a 73 year old female with coronary artery disease status post single-vessel coronary bypass surgery, hyperlipidemia who .  Presents to cardiology clinic in follow-up.    Since last clinical evaluation patient has done well and has noticed some dyspnea with exertion and exertional associated diaphoresis.  She has noted that her VO2 max is decreased on her Apple Watch.  Denies any anginal chest pain.  Denies any syncope or near syncopal episodes.  No lower extremity edema.  Tolerating high-dose statins.  LDL reviewed and at goal at 69.       Physical Examination  Review of Systems   Vitals: /70 (BP Location: Right arm, Patient Position: Sitting, Cuff Size: Adult Regular)   Pulse 75   Resp 20   Wt 77.5 kg (170 lb 14.4 oz)   LMP  (LMP Unknown)   BMI 26.37 kg/m    BMI= Body mass index is 26.37 kg/m .  Wt Readings from Last 3 Encounters:   03/15/24 77.5 kg (170 lb 14.4 oz)   10/19/23 75.8 kg (167 lb)   10/12/23 77.4 kg (170 lb 9.6 oz)        Pleasant   ENT/Mouth: membranes moist, no oral lesions or bleeding gums.      EYES:  no  scleral icterus, normal conjunctivae       Chest/Lungs:   lungs are clear to auscultation, no rales or wheezing, healed sternal scar, equal chest wall expansion    Cardiovascular:   Regular. Normal first and second heart sounds with no murmurs, rubs, or gallops; the carotid, radial and posterior tibial pulses are intact, Jugular venous pressure normal, no edema bilaterally        Extremities: no cyanosis or clubbing   Skin: no xanthelasma, warm.    Neurologic: normal  bilateral, no tremors     Psychiatric: alert and oriented x3, calm        Please refer above for cardiac ROS details.        Medical History  Surgical History Family History Social History   Past Medical History:   Diagnosis Date    Bleeding duodenal ulcer 2010    Chronic diarrhea 04/20/2017    Colonoscopy normal except inflammatory polyp or    Chronic left shoulder pain 9/26/2017    Chronic low back pain without sciatica 11/3/2017    Chronic pain     Chronic radicular low back pain     Pain clinic.  Nerve stimulator placement    Complete tear of left rotator cuff 4/3/2018    Duodenal ulcer     bleeding    Family history of breast cancer     GERD (gastroesophageal reflux disease)     H/O ulcer disease     secondary to medications    Herpes zoster 2010    History of transfusion     Hypercholesterolemia     LA (lupus anticoagulant) disorder (H24)     Left hip pain 2015    bursitis, evaluated by orthopedics    Lichen sclerosus of female genitalia 6/16/2020    MRSA (methicillin resistant staph aureus) culture positive     betweeen 1996 and 2000    Oral lichen planus 4/20/2017    Plantar fasciitis     Raynaud's disease without gangrene 11/20/2018    Involving both feet, describing burning sensation, redness and coolness to touch    Rheumatoid arthritis (H)     Followed by rheumatology, Dr. Felipe,     Screening     DEXA normal T score -0.9 2016, minimal change compared to previous DEXA    VRE (vancomycin resistant enterococcus) culture positive      between 1996 and 2000     Past Surgical History:   Procedure Laterality Date    ABDOMINAL HERNIA REPAIR      APPENDECTOMY      ARTHROSCOPY SHOULDER ROTATOR CUFF REPAIR Right     ARTHROSCOPY SHOULDER ROTATOR CUFF REPAIR Left 04/2018    BACK SURGERY      BIOPSY BREAST Left     BYPASS GRAFT ARTERY CORONARY N/A 3/23/2023    Procedure: CORONARY ARTERY BYPASS GRAFT X1 (CABG), EPIAORTIC ULTRASOUND, ENDOVEIN HARVEST FROM LEFT LOWER EXTREMITY;  Surgeon: Yessenia Zamorano MD;  Location: [facility] OR    COLONOSCOPY      Normal colonoscopy May 2017 except inflammatory polyp    CV CORONARY ANGIOGRAM N/A 3/23/2023    Procedure: Coronary Angiogram;  Surgeon: Marcella Powers MD;  Location: [facility]    CV CORONARY LITHOTRIPSY PCI N/A 3/23/2023    Procedure: Percutaneous Coronary Intervention - Lithotripsy;  Surgeon: Marcella Powers MD;  Location: [facility]    CV INTRA AORTIC BALLOON N/A 3/23/2023    Procedure: Intraprocedure Aortic Balloon Pump Insertion;  Surgeon: Marcella Powers MD;  Location: [facility]    CV LEFT HEART CATH N/A 3/23/2023    Procedure: Left Heart Catheterization;  Surgeon: Marcella Powers MD;  Location: [facility]    CV PCI STENT DRUG ELUTING N/A 3/23/2023    Procedure: Percutaneous Coronary Intervention - Stent;  Surgeon: Marcella Powers MD;  Location: [facility]    CV PERICARDIOCENTESIS N/A 3/23/2023    Procedure: Pericardiocentesis;  Surgeon: Marcella Powers MD;  Location: [facility]    ESOPHAGOSCOPY, GASTROSCOPY, DUODENOSCOPY (EGD), COMBINED N/A 12/16/2018    Procedure: ESOPHAGOGASTRODUODENOSCOPY (EGD) with biopsy and foreign body removal;  Surgeon: Raymond Triana MD;  Location: [facility];  Service: Gastroenterology    HC REMOVAL OF TONSILS,<12 Y/O      Description: Tonsillectomy;  Recorded: 09/30/2011;    HYSTERECTOMY      LUIS ANGEL with BSO    HYSTERECTOMY TOTAL ABDOMINAL, BILATERAL SALPINGO-OOPHORECTOMY, COMBINED      KNEE  SURGERY Right 2007    Arthroscopic knee surgery    OPEN REDUCTION INTERNAL FIXATION ELBOW  2014    RI ARTHRODESIS ANT INTERBODY MIN DISCECTOMY,LUMBAR      Description: Lumbar Vertebral Fusion;  Recorded: 2011;  Comments: L5-S1 fusion    RI STEREOTACT STIM SPINAL CORD      Description: Spinal Stereotaxis Stimulation Of Cord;  Recorded: 2011;  Comments: implanted 2007 with lead revision     SKIN GRAFT       Family History   Problem Relation Age of Onset    Breast Cancer Mother 74.00    Cerebrovascular Disease Mother     Lung Cancer Father 70.00    Breast Cancer Maternal Aunt 70.00    Liver Cancer Brother 48.00        Social History     Socioeconomic History    Marital status:      Spouse name: Not on file    Number of children: Not on file    Years of education: Not on file    Highest education level: Not on file   Occupational History    Not on file   Tobacco Use    Smoking status: Former     Types: Cigarettes     Quit date: 1970     Years since quittin.8    Smokeless tobacco: Never   Vaping Use    Vaping Use: Never used   Substance and Sexual Activity    Alcohol use: No    Drug use: No    Sexual activity: Not on file   Other Topics Concern    Not on file   Social History Narrative    Lives at home with her  , gardening , bikes , swims 4-5 miles walk a day        Social Determinants of Health     Financial Resource Strain: Low Risk  (10/12/2023)    Financial Resource Strain     Within the past 12 months, have you or your family members you live with been unable to get utilities (heat, electricity) when it was really needed?: No   Food Insecurity: Low Risk  (10/12/2023)    Food Insecurity     Within the past 12 months, did you worry that your food would run out before you got money to buy more?: No     Within the past 12 months, did the food you bought just not last and you didn t have money to get more?: No   Transportation Needs: Low Risk  (10/12/2023)     Transportation Needs     Within the past 12 months, has lack of transportation kept you from medical appointments, getting your medicines, non-medical meetings or appointments, work, or from getting things that you need?: No   Physical Activity: Not on file   Stress: Not on file   Social Connections: Not on file   Interpersonal Safety: Low Risk  (10/19/2023)    Interpersonal Safety     Do you feel physically and emotionally safe where you currently live?: Yes     Within the past 12 months, have you been hit, slapped, kicked or otherwise physically hurt by someone?: No     Within the past 12 months, have you been humiliated or emotionally abused in other ways by your partner or ex-partner?: No   Housing Stability: Low Risk  (10/12/2023)    Housing Stability     Do you have housing? : Yes     Are you worried about losing your housing?: No           Medications  Allergies   Current Outpatient Medications   Medication Sig Dispense Refill    aspirin (ASA) 81 MG chewable tablet Take 1 tablet (81 mg) by mouth daily Starting tomorrow. 30 tablet 2    atorvastatin (LIPITOR) 80 MG tablet Take 1 tablet (80 mg) by mouth daily 90 tablet 1    clopidogrel (PLAVIX) 75 MG tablet Take 1 tablet (75 mg) by mouth daily 90 tablet 3    estradiol (ESTRACE) 0.1 MG/GM vaginal cream Place 0.5 g vaginally daily 42.5 g 1    GOLIMUMAB (SIMPONI ARIA IV) [GOLIMUMAB (SIMPONI ARIA IV)] Infuse into a venous catheter every 2 (two) months.       hydroxychloroquine (PLAQUENIL) 200 mg tablet [HYDROXYCHLOROQUINE (PLAQUENIL) 200 MG TABLET] Take 400 mg by mouth daily.       Lactobacillus rhamnosus GG (CULTURELLE) 10-15 Billion cell capsule [LACTOBACILLUS RHAMNOSUS GG (CULTURELLE) 10-15 BILLION CELL CAPSULE] Take 1 capsule by mouth daily.      multivitamin therapeutic (THERAGRAN) tablet [MULTIVITAMIN THERAPEUTIC (THERAGRAN) TABLET] Take 1 tablet by mouth daily.      nitroGLYcerin (NITROSTAT) 0.4 MG sublingual tablet For chest pain place 1 tablet under the  tongue every 5 minutes for 3 doses. If symptoms persist 5 minutes after 1st dose call 911. 30 tablet 1    sulfaSALAzine ER (AZULFIDINE EN) 500 MG EC tablet Take 1,000 mg by mouth 2 times daily      acetaminophen (TYLENOL) 325 MG tablet Take 3 tablets (975 mg) by mouth every 8 hours as needed for mild pain (Patient not taking: Reported on 3/15/2024) 60 tablet 0    chlorhexidine (PERIDEX) 0.12 % solution Take 15 mLs by mouth as needed (Patient not taking: Reported on 3/15/2024)      diclofenac sodium 3 % Gel [DICLOFENAC SODIUM 3 % GEL] 4 g knees three times aday  and 2 g on hands or lebows three times a day (Patient not taking: Reported on 3/15/2024) 3 Tube 3    ferrous sulfate (FEROSUL) 325 (65 Fe) MG tablet Take 1 tablet (325 mg) by mouth daily (with breakfast) (Patient not taking: Reported on 3/15/2024) 90 tablet 2    omeprazole (PRILOSEC) 40 MG DR capsule Take 1 capsule (40 mg) by mouth daily (Patient not taking: Reported on 3/15/2024) 90 capsule 3       Allergies   Allergen Reactions    Codeine Unknown     nausea    Gabapentin Unknown     Fatigue      Pregabalin Unknown     Fatigue            Lab Results    Chemistry/lipid CBC Cardiac Enzymes/BNP/TSH/INR   Recent Labs   Lab Test 02/16/23  1229   CHOL 220*   HDL 96   *   TRIG 80     Recent Labs   Lab Test 02/16/23  1229 09/13/22  1028 06/24/21  0943   * 158* 147*     Recent Labs   Lab Test 02/16/23  1229      POTASSIUM 4.5   CHLORIDE 104   CO2 25   *   BUN 23.9*   CR 0.91   GFRESTIMATED 67   LULU 10.0     Recent Labs   Lab Test 02/16/23  1229 06/24/21  0943   CR 0.91 0.92     Recent Labs   Lab Test 02/16/23  1229 09/13/22  1028 06/24/21  0943   A1C 5.8* 5.8* 5.7*          Recent Labs   Lab Test 06/24/21  0943   WBC 3.6*   HGB 14.0   HCT 42.9           Recent Labs   Lab Test 06/24/21  0943 04/03/18  1422   HGB 14.0 14.0    No results for input(s): TROPONINI in the last 16306 hours.  No results for input(s): BNP, NTBNPI, NTBNP  in the last 93287 hours.  Recent Labs   Lab Test 09/13/22  1028   TSH 2.23     No results for input(s): INR in the last 19536 hours.     Germán Trevino DO    Today's clinic visit entailed:  30 minutes spent by me on the date of the encounter doing chart review, history and exam, documentation and further activities per the note  Provider  Link to MDM Help Grid

## 2024-03-15 NOTE — LETTER
3/15/2024    Sarah Beth Carvalho MD  [address] [facility] [address]    RE: Antonia Everett       Dear Colleague,     I had the pleasure of seeing Antonia Everett in the [facility].    HEART CARE ENCOUNTER CONSULTATON NOTE      [facility]  [phone]      Assessment/Recommendations   Assessment:   1.  Coronary artery disease status percutaneous coronary intervention to the mid RCA (March 23, 2023).  During procedure patient developed coronary perforation of RCA and hemopericardium requiring emergent cardiothoracic surgery.  She underwent single-vessel bypass of the right coronary.  Currently doing well.  No active chest pain symptoms  2.  Hyperlipidemia  3.  Rheumatoid arthritis  4.  Dyspnea on exertion    Plan:   1.  Treadmill echo stress test given dyspnea on exertion and reduction in VO2 on her Apple Watch.  2.  Increase aspirin to 162 mg daily  3.  Discontinue LDL 69 at goal Plavix 75 mg daily   4.  Continue atorvastatin,   5.  Patient continues to walk 4 miles per day.       History of Present Illness/Subjective    HPI: Antonia Everett is a 73 year old female with coronary artery disease status post single-vessel coronary bypass surgery, hyperlipidemia who .  Presents to cardiology clinic in follow-up.    Since last clinical evaluation patient has done well and has noticed some dyspnea with exertion and exertional associated diaphoresis.  She has noted that her VO2 max is decreased on her Apple Watch.  Denies any anginal chest pain.  Denies any syncope or near syncopal episodes.  No lower extremity edema.  Tolerating high-dose statins.  LDL reviewed and at goal at 69.       Physical Examination  Review of Systems   Vitals: /70 (BP Location: Right arm, Patient Position: Sitting, Cuff Size: Adult Regular)   Pulse 75   Resp 20   Wt 77.5 kg (170 lb 14.4 oz)   LMP  (LMP Unknown)   BMI 26.37 kg/m    BMI= Body mass index is 26.37 kg/m .  Wt Readings from Last 3  Encounters:   03/15/24 77.5 kg (170 lb 14.4 oz)   10/19/23 75.8 kg (167 lb)   10/12/23 77.4 kg (170 lb 9.6 oz)        Pleasant   ENT/Mouth: membranes moist, no oral lesions or bleeding gums.      EYES:  no scleral icterus, normal conjunctivae       Chest/Lungs:   lungs are clear to auscultation, no rales or wheezing, healed sternal scar, equal chest wall expansion    Cardiovascular:   Regular. Normal first and second heart sounds with no murmurs, rubs, or gallops; the carotid, radial and posterior tibial pulses are intact, Jugular venous pressure normal, no edema bilaterally        Extremities: no cyanosis or clubbing   Skin: no xanthelasma, warm.    Neurologic: normal  bilateral, no tremors     Psychiatric: alert and oriented x3, calm        Please refer above for cardiac ROS details.        Medical History  Surgical History Family History Social History   Past Medical History:   Diagnosis Date    Bleeding duodenal ulcer 2010    Chronic diarrhea 04/20/2017    Colonoscopy normal except inflammatory polyp or    Chronic left shoulder pain 9/26/2017    Chronic low back pain without sciatica 11/3/2017    Chronic pain     Chronic radicular low back pain     Pain clinic.  Nerve stimulator placement    Complete tear of left rotator cuff 4/3/2018    Duodenal ulcer     bleeding    Family history of breast cancer     GERD (gastroesophageal reflux disease)     H/O ulcer disease     secondary to medications    Herpes zoster 2010    History of transfusion     Hypercholesterolemia     LA (lupus anticoagulant) disorder (H24)     Left hip pain 2015    bursitis, evaluated by orthopedics    Lichen sclerosus of female genitalia 6/16/2020    MRSA (methicillin resistant staph aureus) culture positive     betweeen 1996 and 2000    Oral lichen planus 4/20/2017    Plantar fasciitis     Raynaud's disease without gangrene 11/20/2018    Involving both feet, describing burning sensation, redness and coolness to touch    Rheumatoid  arthritis (H)     Followed by rheumatology, Dr. Felipe,     Screening     DEXA normal T score -0.9 2016, minimal change compared to previous DEXA    VRE (vancomycin resistant enterococcus) culture positive     between 1996 and 2000     Past Surgical History:   Procedure Laterality Date    ABDOMINAL HERNIA REPAIR      APPENDECTOMY      ARTHROSCOPY SHOULDER ROTATOR CUFF REPAIR Right     ARTHROSCOPY SHOULDER ROTATOR CUFF REPAIR Left 04/2018    BACK SURGERY      BIOPSY BREAST Left     BYPASS GRAFT ARTERY CORONARY N/A 3/23/2023    Procedure: CORONARY ARTERY BYPASS GRAFT X1 (CABG), EPIAORTIC ULTRASOUND, ENDOVEIN HARVEST FROM LEFT LOWER EXTREMITY;  Surgeon: Yessenia Zamorano MD;  Location: [facility] OR    COLONOSCOPY      Normal colonoscopy May 2017 except inflammatory polyp    CV CORONARY ANGIOGRAM N/A 3/23/2023    Procedure: Coronary Angiogram;  Surgeon: Marcella Powers MD;  Location: [facility]    CV CORONARY LITHOTRIPSY PCI N/A 3/23/2023    Procedure: Percutaneous Coronary Intervention - Lithotripsy;  Surgeon: Marcella Powers MD;  Location: [facility]    CV INTRA AORTIC BALLOON N/A 3/23/2023    Procedure: Intraprocedure Aortic Balloon Pump Insertion;  Surgeon: Marcella Powers MD;  Location: [facility]    CV LEFT HEART CATH N/A 3/23/2023    Procedure: Left Heart Catheterization;  Surgeon: Marcella Powers MD;  Location: [facility]    CV PCI STENT DRUG ELUTING N/A 3/23/2023    Procedure: Percutaneous Coronary Intervention - Stent;  Surgeon: Marcella Powers MD;  Location: [facility]    CV PERICARDIOCENTESIS N/A 3/23/2023    Procedure: Pericardiocentesis;  Surgeon: Marcella Powers MD;  Location: [facility]    ESOPHAGOSCOPY, GASTROSCOPY, DUODENOSCOPY (EGD), COMBINED N/A 12/16/2018    Procedure: ESOPHAGOGASTRODUODENOSCOPY (EGD) with biopsy and foreign body removal;  Surgeon: Raymond Triana MD;  Location: [facility];  Service: Gastroenterology     HC REMOVAL OF TONSILS,<11 Y/O      Description: Tonsillectomy;  Recorded: 2011;    HYSTERECTOMY      LUIS ANGEL with BSO    HYSTERECTOMY TOTAL ABDOMINAL, BILATERAL SALPINGO-OOPHORECTOMY, COMBINED      KNEE SURGERY Right 2007    Arthroscopic knee surgery    OPEN REDUCTION INTERNAL FIXATION ELBOW  2014    NM ARTHRODESIS ANT INTERBODY MIN DISCECTOMY,LUMBAR      Description: Lumbar Vertebral Fusion;  Recorded: 2011;  Comments: L5-S1 fusion    NM STEREOTACT STIM SPINAL CORD      Description: Spinal Stereotaxis Stimulation Of Cord;  Recorded: 2011;  Comments: implanted 2007 with lead revision     SKIN GRAFT       Family History   Problem Relation Age of Onset    Breast Cancer Mother 74.00    Cerebrovascular Disease Mother     Lung Cancer Father 70.00    Breast Cancer Maternal Aunt 70.00    Liver Cancer Brother 48.00        Social History     Socioeconomic History    Marital status:      Spouse name: Not on file    Number of children: Not on file    Years of education: Not on file    Highest education level: Not on file   Occupational History    Not on file   Tobacco Use    Smoking status: Former     Types: Cigarettes     Quit date: 1970     Years since quittin.8    Smokeless tobacco: Never   Vaping Use    Vaping Use: Never used   Substance and Sexual Activity    Alcohol use: No    Drug use: No    Sexual activity: Not on file   Other Topics Concern    Not on file   Social History Narrative    Lives at home with her  , gardening , bikes , swims 4-5 miles walk a day        Social Determinants of Health     Financial Resource Strain: Low Risk  (10/12/2023)    Financial Resource Strain     Within the past 12 months, have you or your family members you live with been unable to get utilities (heat, electricity) when it was really needed?: No   Food Insecurity: Low Risk  (10/12/2023)    Food Insecurity     Within the past 12 months, did you worry that your food would run out  before you got money to buy more?: No     Within the past 12 months, did the food you bought just not last and you didn t have money to get more?: No   Transportation Needs: Low Risk  (10/12/2023)    Transportation Needs     Within the past 12 months, has lack of transportation kept you from medical appointments, getting your medicines, non-medical meetings or appointments, work, or from getting things that you need?: No   Physical Activity: Not on file   Stress: Not on file   Social Connections: Not on file   Interpersonal Safety: Low Risk  (10/19/2023)    Interpersonal Safety     Do you feel physically and emotionally safe where you currently live?: Yes     Within the past 12 months, have you been hit, slapped, kicked or otherwise physically hurt by someone?: No     Within the past 12 months, have you been humiliated or emotionally abused in other ways by your partner or ex-partner?: No   Housing Stability: Low Risk  (10/12/2023)    Housing Stability     Do you have housing? : Yes     Are you worried about losing your housing?: No           Medications  Allergies   Current Outpatient Medications   Medication Sig Dispense Refill    aspirin (ASA) 81 MG chewable tablet Take 1 tablet (81 mg) by mouth daily Starting tomorrow. 30 tablet 2    atorvastatin (LIPITOR) 80 MG tablet Take 1 tablet (80 mg) by mouth daily 90 tablet 1    clopidogrel (PLAVIX) 75 MG tablet Take 1 tablet (75 mg) by mouth daily 90 tablet 3    estradiol (ESTRACE) 0.1 MG/GM vaginal cream Place 0.5 g vaginally daily 42.5 g 1    GOLIMUMAB (SIMPONI ARIA IV) [GOLIMUMAB (SIMPONI ARIA IV)] Infuse into a venous catheter every 2 (two) months.       hydroxychloroquine (PLAQUENIL) 200 mg tablet [HYDROXYCHLOROQUINE (PLAQUENIL) 200 MG TABLET] Take 400 mg by mouth daily.       Lactobacillus rhamnosus GG (CULTURELLE) 10-15 Billion cell capsule [LACTOBACILLUS RHAMNOSUS GG (CULTURELLE) 10-15 BILLION CELL CAPSULE] Take 1 capsule by mouth daily.      multivitamin  therapeutic (THERAGRAN) tablet [MULTIVITAMIN THERAPEUTIC (THERAGRAN) TABLET] Take 1 tablet by mouth daily.      nitroGLYcerin (NITROSTAT) 0.4 MG sublingual tablet For chest pain place 1 tablet under the tongue every 5 minutes for 3 doses. If symptoms persist 5 minutes after 1st dose call 911. 30 tablet 1    sulfaSALAzine ER (AZULFIDINE EN) 500 MG EC tablet Take 1,000 mg by mouth 2 times daily      acetaminophen (TYLENOL) 325 MG tablet Take 3 tablets (975 mg) by mouth every 8 hours as needed for mild pain (Patient not taking: Reported on 3/15/2024) 60 tablet 0    chlorhexidine (PERIDEX) 0.12 % solution Take 15 mLs by mouth as needed (Patient not taking: Reported on 3/15/2024)      diclofenac sodium 3 % Gel [DICLOFENAC SODIUM 3 % GEL] 4 g knees three times aday  and 2 g on hands or lebows three times a day (Patient not taking: Reported on 3/15/2024) 3 Tube 3    ferrous sulfate (FEROSUL) 325 (65 Fe) MG tablet Take 1 tablet (325 mg) by mouth daily (with breakfast) (Patient not taking: Reported on 3/15/2024) 90 tablet 2    omeprazole (PRILOSEC) 40 MG DR capsule Take 1 capsule (40 mg) by mouth daily (Patient not taking: Reported on 3/15/2024) 90 capsule 3       Allergies   Allergen Reactions    Codeine Unknown     nausea    Gabapentin Unknown     Fatigue      Pregabalin Unknown     Fatigue            Lab Results    Chemistry/lipid CBC Cardiac Enzymes/BNP/TSH/INR   Recent Labs   Lab Test 02/16/23  1229   CHOL 220*   HDL 96   *   TRIG 80     Recent Labs   Lab Test 02/16/23  1229 09/13/22  1028 06/24/21  0943   * 158* 147*     Recent Labs   Lab Test 02/16/23  1229      POTASSIUM 4.5   CHLORIDE 104   CO2 25   *   BUN 23.9*   CR 0.91   GFRESTIMATED 67   LULU 10.0     Recent Labs   Lab Test 02/16/23  1229 06/24/21  0943   CR 0.91 0.92     Recent Labs   Lab Test 02/16/23  1229 09/13/22  1028 06/24/21  0943   A1C 5.8* 5.8* 5.7*          Recent Labs   Lab Test 06/24/21  0943   WBC 3.6*   HGB 14.0   HCT  42.9           Recent Labs   Lab Test 06/24/21  0943 04/03/18  1422   HGB 14.0 14.0    No results for input(s): TROPONINI in the last 97543 hours.  No results for input(s): BNP, NTBNPI, NTBNP in the last 32889 hours.  Recent Labs   Lab Test 09/13/22  1028   TSH 2.23     No results for input(s): INR in the last 19509 hours.     Germán Trevino DO    Today's clinic visit entailed:  30 minutes spent by me on the date of the encounter doing chart review, history and exam, documentation and further activities per the note  Provider  Link to [facility] Help Grid                         Thank you for allowing me to participate in the care of your patient.      Sincerely,     Germán Trevino DO     [facility]  cc:   Germán Trevino DO  [address] [place] [address]

## 2024-03-15 NOTE — PATIENT INSTRUCTIONS
Please contact direct nurses line Monday through Friday 8 AM to 5 PM @ [phone]    After-hours contact cardiology office at [phone].    Plan:   Treadmill stress echo  Stop plavix 75 mg daily  Increase aspirin to 162 mg daily.

## 2024-04-17 ENCOUNTER — MYC MEDICAL ADVICE (OUTPATIENT)
Dept: CARDIOLOGY | Facility: CLINIC | Age: 74
End: 2024-04-17

## 2024-04-17 ENCOUNTER — HOSPITAL ENCOUNTER (OUTPATIENT)
Dept: CARDIOLOGY | Facility: CLINIC | Age: 74
Discharge: HOME | End: 2024-04-17
Attending: INTERNAL MEDICINE | Admitting: INTERNAL MEDICINE
Payer: MEDICARE

## 2024-04-17 DIAGNOSIS — R06.02 EXERTIONAL SHORTNESS OF BREATH: ICD-10-CM

## 2024-04-17 DIAGNOSIS — I25.10 CORONARY ARTERY DISEASE INVOLVING NATIVE HEART WITHOUT ANGINA PECTORIS, UNSPECIFIED VESSEL OR LESION TYPE: ICD-10-CM

## 2024-04-17 DIAGNOSIS — I25.10 CORONARY ARTERY DISEASE INVOLVING NATIVE CORONARY ARTERY OF NATIVE HEART WITHOUT ANGINA PECTORIS: Primary | ICD-10-CM

## 2024-04-17 PROCEDURE — 93018 CV STRESS TEST I&R ONLY: CPT | Performed by: INTERNAL MEDICINE

## 2024-04-17 PROCEDURE — 93325 DOPPLER ECHO COLOR FLOW MAPG: CPT | Mod: TC

## 2024-04-17 PROCEDURE — 93325 DOPPLER ECHO COLOR FLOW MAPG: CPT | Mod: 26 | Performed by: INTERNAL MEDICINE

## 2024-04-17 PROCEDURE — 93016 CV STRESS TEST SUPVJ ONLY: CPT | Performed by: INTERNAL MEDICINE

## 2024-04-17 PROCEDURE — 93350 STRESS TTE ONLY: CPT | Mod: TC

## 2024-04-17 PROCEDURE — 93321 DOPPLER ECHO F-UP/LMTD STD: CPT | Mod: 26 | Performed by: INTERNAL MEDICINE

## 2024-04-17 PROCEDURE — 93350 STRESS TTE ONLY: CPT | Mod: 26 | Performed by: INTERNAL MEDICINE

## 2024-05-21 ENCOUNTER — TRANSFERRED RECORDS (OUTPATIENT)
Dept: HEALTH INFORMATION MANAGEMENT | Facility: CLINIC | Age: 74
End: 2024-05-21
Payer: MEDICARE

## 2024-05-21 LAB
ALT SERPL-CCNC: 28 IU/L (ref 5–35)
AST SERPL-CCNC: 45 U/L (ref 5–34)
CREATININE (EXTERNAL): 0.8 MG/DL (ref 0.5–1.3)

## 2024-08-03 DIAGNOSIS — N95.2 VAGINAL ATROPHY: ICD-10-CM

## 2024-08-05 RX ORDER — ESTRADIOL 0.1 MG/G
CREAM VAGINAL
Qty: 42.5 G | Refills: 0 | Status: SHIPPED | OUTPATIENT
Start: 2024-08-05

## 2024-08-09 DIAGNOSIS — Z95.1 S/P CABG (CORONARY ARTERY BYPASS GRAFT): ICD-10-CM

## 2024-08-09 RX ORDER — ATORVASTATIN CALCIUM 80 MG/1
80 TABLET, FILM COATED ORAL DAILY
Qty: 90 TABLET | Refills: 0 | Status: SHIPPED | OUTPATIENT
Start: 2024-08-09

## 2024-08-19 ENCOUNTER — TRANSFERRED RECORDS (OUTPATIENT)
Dept: HEALTH INFORMATION MANAGEMENT | Facility: CLINIC | Age: 74
End: 2024-08-19
Payer: MEDICARE

## 2024-08-20 ENCOUNTER — NURSE TRIAGE (OUTPATIENT)
Dept: CARDIOLOGY | Facility: CLINIC | Age: 74
End: 2024-08-20
Payer: MEDICARE

## 2024-08-20 ENCOUNTER — TRANSFERRED RECORDS (OUTPATIENT)
Dept: HEALTH INFORMATION MANAGEMENT | Facility: CLINIC | Age: 74
End: 2024-08-20
Payer: MEDICARE

## 2024-08-20 NOTE — TELEPHONE ENCOUNTER
Dr Garza please review. This appears to be an infusion reaction. Best advise proper hydration and to follow-up with provider ordering infusions? Unable to see documentation of infusions through search. Thank you CMM,Rn

## 2024-08-20 NOTE — TELEPHONE ENCOUNTER
"Received a call from the [facility] to discuss low blood pressure.    HX: CAD s/p CABG x 1, HLD, [place]  HX: RA    Spoke to Erika, who says she was at the arthritis clinic getting an infusion of Simponi Aria today (every 3 months) and her blood pressure was low 85/55 and then 90/55 on recheck. She says she did drink about 10-12 ounces of water before the appointment and during the appointment drank another 8 ounces, and on the way home drank some water in the car.  She says she was not dizzy or lightheaded at that time. She says she has had low blood pressure before.  She was told to follow up with cardiology.  She is not on any blood pressure medications.  Asked to recheck VS during the call using her arm cuff BP monitor: 169/91 HR 98. Repositioned the cuff and recheck was 153/114 HR 96. She denies a headache, dizziness, or visual disturbance. Last BP recheck was 149/76.  She would like a call back for any recommendations.  Advised a message will be sent to [place] [facility] for follow up.    1. BLOOD PRESSURE: \"What is your blood pressure?\" \"Did you take at least two measurements 5 minutes apart?\" Low BP at arthritis clinic during infusion  2. ONSET: \"When did you take your blood pressure?\"  3. HOW: \"How did you take your blood pressure?\" (e.g., visiting nurse, automatic home BP monitor)  4. HISTORY: \"Do you have a history of low blood pressure?\" \"What is your blood pressure normally?\"  5. MEDICINES: \"Are you taking any medicines for blood pressure?\" If Yes, ask: \"Have they been changed recently?\"No medications  6. PULSE RATE: \"Do you know what your pulse rate is?\" See above  7. OTHER SYMPTOMS: \"Have you been sick recently?\" \"Have you had a recent injury?\"  8. PREGNANCY: \"Is there any chance you are pregnant?\" \"When was your last menstrual period?\"  Additional Information    Negative: Systolic BP < 80 and NOT dizzy, lightheaded or weak (feels normal)    Protocols used: Blood Pressure - Low-A-OH    "

## 2024-08-22 NOTE — TELEPHONE ENCOUNTER
===View-only below this line===  ----- Message -----  From: Germán Trevino DO  Sent: 8/22/2024   3:17 PM CDT  To: Marco A Cabrera RN    Patient is not on antihypertensive medications.  Doesn't appear to be a cardiology issue?  She we getting a medication infusion.  Likely a medication reaction... Need to have this reviewed by RX MD before any further medication infusion.       Thank you!    Mike

## 2024-08-22 NOTE — TELEPHONE ENCOUNTER
PC to patient and review of response from provider. No issues since. Reports that she did discuss with the infusion provider, whom she saw following the infusion. Pt reports that she felt asymptomatic with the blood pressure at those numbers. Encouraged to use at home cuff, which was calibrated 6-9 months ago. Check her BP twice weekly, and keep written log. Bring to annual wellness visit due in October. Call cardiology sooner if getting as low readings as at infusion. Verbalized understanding. Encouraged proper hydration. JULIEN,RN

## 2024-09-03 ENCOUNTER — ANCILLARY PROCEDURE (OUTPATIENT)
Dept: MAMMOGRAPHY | Facility: CLINIC | Age: 74
End: 2024-09-03
Attending: INTERNAL MEDICINE
Payer: MEDICARE

## 2024-09-03 DIAGNOSIS — Z12.31 VISIT FOR SCREENING MAMMOGRAM: ICD-10-CM

## 2024-09-03 PROCEDURE — 77063 BREAST TOMOSYNTHESIS BI: CPT | Mod: TC | Performed by: RADIOLOGY

## 2024-09-03 PROCEDURE — 77067 SCR MAMMO BI INCL CAD: CPT | Mod: TC | Performed by: RADIOLOGY

## 2024-09-13 ENCOUNTER — TRANSFERRED RECORDS (OUTPATIENT)
Dept: HEALTH INFORMATION MANAGEMENT | Facility: CLINIC | Age: 74
End: 2024-09-13
Payer: MEDICARE

## 2024-09-16 NOTE — PROGRESS NOTES
"  HEART CARE ENCOUNTER CONSULTATON NOTE      [facility]  [phone]      Assessment/Recommendations   Assessment:   Coronary artery disease: s/p CABGx1 to RCA 3/2023 following coronary perforation caused by PCI leading to hemopericardium and emergent cardiothoracic surgery.  Echo stress test 4/2024 with dyspnea showed no evidence of ischemia, preserved resting LVEF 55-60% without WMA, Rare PVCs.  Continues aspirin 162 mg.  Intermittent, nonexertional short lasting chest discomfort is atypical for angina.  She will continue to monitor this after recent nonischemic elevation.  Dyslipidemia: Atorvastatin 80 mg  Rheumatoid arthritis: ALT/AST within normal limits  Hypotensive: Initially 78/62, after a glass of water repeat 88/60.  She is asymptomatic      Plan:   Continue aspirin 162 mg and atorvastatin 80 mg daily  Will have lipid panel with PCP. Goal LDL <70.  Patient drink water during visit, she showed no signs of symptomatic hypotension.  We discussed eating breakfast and drinking a lot of water today when she returns home.  She is agreeable.  If she becomes presyncopal/lightheaded throughout the day we discussed reasons to present to emergency room.      Follow up in 1 year or sooner as needed      History of Present Illness/Subjective    HPI: Antonia Everett is a 74 year old female with PMHx of PAD, HLD, RA presents for follow-up.    Patient reports doing well overall for the last 6 months.  She had echocardiogram stress test performed 4/2024 with complaint of dyspnea with exertion which was unremarkable and without ischemia.  She continues to exercise daily with weight training, walking miles a day.  She denies any chest discomfort or significant dyspnea during these activities.  She does sweat \"excessively \"with exercise but feels fine while doing it.  She is noticed in the last few months some maximum 30 seconds long travel left-sided chest discomfort, maybe once daily, not associated with " "exertion.  Is not painful.        She denies lightheadedness, orthopnea, PND, palpitations, and lower extremity edema.      Echocardiogram stress 4/2024 Results:  Interpretation Summary  1. Normal stress echocardiogram without evidence of stress induced ischemia.  2. Normal resting LV systolic performance with an ejection fraction of 55-60%.  There is normal improvement in left ventricular systolic performance with a  peak ejection fraction of 70-75%.  3. No ECG evidence of ischemia.  4. No anginal chest pain reported with exercise.  5. Good functional capacity for age.     Stress Summary: Patient exercised on the Roderick protocol for a total of 9: 30  minutes. Peak heart rate achieved was 155 b.p.m (105% max.) with a double-  product of 20,720. The patient stopped exercise due to generalized fatigue. No  chest pain symptoms were reported.     Electrocardiogram: Baseline ECG reveals normal sinus rhythm without evidence  of prior myocardial injury. With exercise, there are no significant ECG  changes to suggest ischemia. Rare PVCs versus PACs with aberrancy were noted.     Baseline echocardiogram: Technically adequate images were obtained in the  standard quad-screen format. LV systolic performance is normal with a visually  estimated ejection fraction of 55-60%. There is normal regional wall motion.  No significant valvular heart disease is identified on limited screening  Doppler.     Postexercise echocardiogram: Technically adequate images were obtained  immediately post exercise in the standard quad-screen format. LV systolic  performance normally improves with a peak ejection fraction of 70-75%. There  are no stress induced regional wall motion abnormalities.     Physical Examination  Review of Systems   Vitals: BP (!) 88/60   Pulse 84   Resp 16   Ht 1.727 m (5' 8\")   Wt 70.2 kg (154 lb 12.8 oz)   LMP  (LMP Unknown)   BMI 23.54 kg/m    BMI= Body mass index is 23.54 kg/m .  Wt Readings from Last 3 Encounters: "   09/17/24 70.2 kg (154 lb 12.8 oz)   03/15/24 77.5 kg (170 lb 14.4 oz)   10/19/23 75.8 kg (167 lb)           ENT/Mouth: membranes moist, no oral lesions or bleeding gums.      EYES:  no scleral icterus, normal conjunctivae       Chest/Lungs:   lungs are clear to auscultation, no rales or wheezing,  equal chest wall expansion    Cardiovascular:   Regular. Normal first and second heart sounds with no murmurs, rubs, or gallops; the carotid, radial pulses are intact, absent edema bilaterally        Extremities: no cyanosis or clubbing   Skin: no xanthelasma, warm.    Neurologic:  no tremors     Psychiatric: alert and oriented x3, calm        Please refer above for cardiac ROS details.        Medical History  Surgical History Family History Social History   Past Medical History:   Diagnosis Date    Bleeding duodenal ulcer 2010    Chronic diarrhea 04/20/2017    Colonoscopy normal except inflammatory polyp or    Chronic left shoulder pain 9/26/2017    Chronic low back pain without sciatica 11/3/2017    Chronic pain     Chronic radicular low back pain     Pain clinic.  Nerve stimulator placement    Complete tear of left rotator cuff 4/3/2018    Duodenal ulcer     bleeding    Family history of breast cancer     GERD (gastroesophageal reflux disease)     H/O ulcer disease     secondary to medications    Herpes zoster 2010    History of transfusion     Hypercholesterolemia     LA (lupus anticoagulant) disorder (H24)     Left hip pain 2015    bursitis, evaluated by orthopedics    Lichen sclerosus of female genitalia 6/16/2020    MRSA (methicillin resistant staph aureus) culture positive     betweeen 1996 and 2000    Oral lichen planus 4/20/2017    Plantar fasciitis     Raynaud's disease without gangrene 11/20/2018    Involving both feet, describing burning sensation, redness and coolness to touch    Rheumatoid arthritis (H)     Followed by rheumatology, Dr. Felipe,     Screening     DEXA normal T score -0.9 2016, minimal  change compared to previous DEXA    VRE (vancomycin resistant enterococcus) culture positive     between 1996 and 2000     Past Surgical History:   Procedure Laterality Date    ABDOMINAL HERNIA REPAIR      APPENDECTOMY      ARTHROSCOPY SHOULDER ROTATOR CUFF REPAIR Right     ARTHROSCOPY SHOULDER ROTATOR CUFF REPAIR Left 04/2018    BACK SURGERY      BIOPSY BREAST Left     BYPASS GRAFT ARTERY CORONARY N/A 3/23/2023    Procedure: CORONARY ARTERY BYPASS GRAFT X1 (CABG), EPIAORTIC ULTRASOUND, ENDOVEIN HARVEST FROM LEFT LOWER EXTREMITY;  Surgeon: Yessenia Zamorano MD;  Location: [facility] OR    COLONOSCOPY      Normal colonoscopy May 2017 except inflammatory polyp    CV CORONARY ANGIOGRAM N/A 3/23/2023    Procedure: Coronary Angiogram;  Surgeon: Marcella Powers MD;  Location: [facility]    CV CORONARY LITHOTRIPSY PCI N/A 3/23/2023    Procedure: Percutaneous Coronary Intervention - Lithotripsy;  Surgeon: Marcella Powers MD;  Location: [facility]    CV INTRA AORTIC BALLOON N/A 3/23/2023    Procedure: Intraprocedure Aortic Balloon Pump Insertion;  Surgeon: Marcella Powers MD;  Location: [facility]    CV LEFT HEART CATH N/A 3/23/2023    Procedure: Left Heart Catheterization;  Surgeon: Marcella Powers MD;  Location: [facility]    CV PCI STENT DRUG ELUTING N/A 3/23/2023    Procedure: Percutaneous Coronary Intervention - Stent;  Surgeon: Marcella Powers MD;  Location: [facility]    CV PERICARDIOCENTESIS N/A 3/23/2023    Procedure: Pericardiocentesis;  Surgeon: Marcella Powers MD;  Location: [facility]    ESOPHAGOSCOPY, GASTROSCOPY, DUODENOSCOPY (EGD), COMBINED N/A 12/16/2018    Procedure: ESOPHAGOGASTRODUODENOSCOPY (EGD) with biopsy and foreign body removal;  Surgeon: Raymond Triana MD;  Location: [facility];  Service: Gastroenterology    HC REMOVAL OF TONSILS,<13 Y/O      Description: Tonsillectomy;  Recorded: 09/30/2011;    HYSTERECTOMY      LUIS ANGEL  with BSO    HYSTERECTOMY TOTAL ABDOMINAL, BILATERAL SALPINGO-OOPHORECTOMY, COMBINED      KNEE SURGERY Right 2007    Arthroscopic knee surgery    OPEN REDUCTION INTERNAL FIXATION ELBOW  2014    DE ARTHRODESIS ANT INTERBODY MIN DISCECTOMY,LUMBAR      Description: Lumbar Vertebral Fusion;  Recorded: 2011;  Comments: L5-S1 fusion    DE STEREOTACT STIM SPINAL CORD      Description: Spinal Stereotaxis Stimulation Of Cord;  Recorded: 2011;  Comments: implanted 2007 with lead revision     SKIN GRAFT       Family History   Problem Relation Age of Onset    Breast Cancer Mother 74.00    Cerebrovascular Disease Mother     Lung Cancer Father 70.00    Breast Cancer Maternal Aunt 70.00    Liver Cancer Brother 48.00        Social History     Socioeconomic History    Marital status:      Spouse name: Not on file    Number of children: Not on file    Years of education: Not on file    Highest education level: Not on file   Occupational History    Not on file   Tobacco Use    Smoking status: Former     Current packs/day: 0.00     Types: Cigarettes     Quit date: 1970     Years since quittin.3    Smokeless tobacco: Never   Vaping Use    Vaping status: Never Used   Substance and Sexual Activity    Alcohol use: No    Drug use: No    Sexual activity: Not on file   Other Topics Concern    Not on file   Social History Narrative    Lives at home with her  , gardening , bikes , swims 4-5 miles walk a day        Social Determinants of Health     Financial Resource Strain: Low Risk  (10/12/2023)    Financial Resource Strain     Within the past 12 months, have you or your family members you live with been unable to get utilities (heat, electricity) when it was really needed?: No   Food Insecurity: Low Risk  (10/12/2023)    Food Insecurity     Within the past 12 months, did you worry that your food would run out before you got money to buy more?: No     Within the past 12 months, did the food you  bought just not last and you didn t have money to get more?: No   Transportation Needs: Low Risk  (10/12/2023)    Transportation Needs     Within the past 12 months, has lack of transportation kept you from medical appointments, getting your medicines, non-medical meetings or appointments, work, or from getting things that you need?: No   Physical Activity: Not on file   Stress: Not on file   Social Connections: Not on file   Interpersonal Safety: Low Risk  (10/19/2023)    Interpersonal Safety     Do you feel physically and emotionally safe where you currently live?: Yes     Within the past 12 months, have you been hit, slapped, kicked or otherwise physically hurt by someone?: No     Within the past 12 months, have you been humiliated or emotionally abused in other ways by your partner or ex-partner?: No   Housing Stability: Low Risk  (10/12/2023)    Housing Stability     Do you have housing? : Yes     Are you worried about losing your housing?: No           Medications  Allergies   Current Outpatient Medications   Medication Sig Dispense Refill    acetaminophen (TYLENOL) 325 MG tablet Take 3 tablets (975 mg) by mouth every 8 hours as needed for mild pain 60 tablet 0    aspirin (ASA) 81 MG chewable tablet Take 2 tablets (162 mg) by mouth daily Starting tomorrow.      atorvastatin (LIPITOR) 80 MG tablet TAKE ONE TABLET BY MOUTH ONE TIME DAILY 90 tablet 0    chlorhexidine (PERIDEX) 0.12 % solution Take 15 mLs by mouth as needed.      diclofenac sodium 3 % Gel [DICLOFENAC SODIUM 3 % GEL] 4 g knees three times aday  and 2 g on hands or lebows three times a day 3 Tube 3    estradiol (ESTRACE) 0.1 MG/GM vaginal cream PLACE 0.5 G VAGINALLY ONCE DAILY 42.5 g 0    GOLIMUMAB (SIMPONI ARIA IV) [GOLIMUMAB (SIMPONI ARIA IV)] Infuse into a venous catheter every 2 (two) months.       hydroxychloroquine (PLAQUENIL) 200 mg tablet [HYDROXYCHLOROQUINE (PLAQUENIL) 200 MG TABLET] Take 400 mg by mouth daily.       Lactobacillus rhamnosus  "GG (CULTURELLE) 10-15 Billion cell capsule [LACTOBACILLUS RHAMNOSUS GG (CULTURELLE) 10-15 BILLION CELL CAPSULE] Take 1 capsule by mouth daily.      multivitamin therapeutic (THERAGRAN) tablet [MULTIVITAMIN THERAPEUTIC (THERAGRAN) TABLET] Take 1 tablet by mouth daily.      nitroGLYcerin (NITROSTAT) 0.4 MG sublingual tablet For chest pain place 1 tablet under the tongue every 5 minutes for 3 doses. If symptoms persist 5 minutes after 1st dose call 911. 30 tablet 1    omeprazole (PRILOSEC) 40 MG DR capsule Take 1 capsule (40 mg) by mouth daily 90 capsule 3    sulfaSALAzine ER (AZULFIDINE EN) 500 MG EC tablet Take 1,000 mg by mouth 2 times daily         Allergies   Allergen Reactions    Codeine Unknown     nausea    Gabapentin Unknown     Fatigue      Pregabalin Unknown     Fatigue            Lab Results    Chemistry/lipid CBC Cardiac Enzymes/BNP/TSH/INR   Recent Labs   Lab Test 10/19/23  0928   CHOL 170   HDL 82   LDL 69   TRIG 95     Recent Labs   Lab Test 10/19/23  0928 02/16/23  1229 09/13/22  1028   LDL 69 108* 158*     Recent Labs   Lab Test 10/12/23  0859 04/04/23  1020   NA  --  139   POTASSIUM  --  4.8   CHLORIDE  --  108*   CO2  --  23   GLC  --  95   BUN  --  22   CR 0.94 1.07   GFRESTIMATED 64 55*   LULU  --  9.3     Recent Labs   Lab Test 10/12/23  0859 04/04/23  1020 03/27/23  0436   CR 0.94 1.07 0.83     Recent Labs   Lab Test 10/19/23  0928 02/16/23  1229 09/13/22  1028   A1C 6.0* 5.8* 5.8*          Recent Labs   Lab Test 10/12/23  0859   WBC 3.1*   HGB 9.9*   HCT 33.4*   MCV 84        Recent Labs   Lab Test 10/12/23  0859 04/04/23  1020 03/27/23  0436   HGB 9.9* 9.4* 7.7*    No results for input(s): \"TROPONINI\" in the last 18652 hours.  No results for input(s): \"BNP\", \"NTBNPI\", \"NTBNP\" in the last 13625 hours.  Recent Labs   Lab Test 10/19/23  0928   TSH 1.92     Recent Labs   Lab Test 03/23/23  1655 03/23/23  1528 03/23/23  1306   INR 0.91 1.64* 1.35*          This note has been dictated using " voice recognition software. Any grammatical, typographical, or context distortions are unintentional and inherent to the software    Tila Montoya PA-C

## 2024-09-17 ENCOUNTER — OFFICE VISIT (OUTPATIENT)
Dept: CARDIOLOGY | Facility: CLINIC | Age: 74
End: 2024-09-17
Payer: MEDICARE

## 2024-09-17 VITALS
RESPIRATION RATE: 16 BRPM | HEART RATE: 84 BPM | SYSTOLIC BLOOD PRESSURE: 88 MMHG | DIASTOLIC BLOOD PRESSURE: 60 MMHG | WEIGHT: 154.8 LBS | BODY MASS INDEX: 23.46 KG/M2 | HEIGHT: 68 IN

## 2024-09-17 DIAGNOSIS — I95.9 HYPOTENSION, UNSPECIFIED HYPOTENSION TYPE: Primary | ICD-10-CM

## 2024-09-17 DIAGNOSIS — I21.9: ICD-10-CM

## 2024-09-17 DIAGNOSIS — I25.10 CORONARY ARTERY DISEASE INVOLVING NATIVE HEART WITHOUT ANGINA PECTORIS, UNSPECIFIED VESSEL OR LESION TYPE: ICD-10-CM

## 2024-09-17 DIAGNOSIS — E78.00 HYPERCHOLESTEROLEMIA: ICD-10-CM

## 2024-09-17 PROCEDURE — 99214 OFFICE O/P EST MOD 30 MIN: CPT

## 2024-09-17 NOTE — PATIENT INSTRUCTIONS
It was a pleasure taking part in your care today:    - Continue current medications  - Follow up in 1 year  - Drink water and eat snacks today!    Please call the [facility] clinic with any questions or concerns at [phone].     Tila Montoya PA-C

## 2024-09-17 NOTE — LETTER
9/17/2024    Sarah Beth Carvalho MD  [address] [facility] [address]    RE: Antonia Everett       Dear Colleague,     I had the pleasure of seeing Antonia Everett in the [facility].    HEART CARE ENCOUNTER CONSULTATON NOTE      [facility]  [phone]      Assessment/Recommendations   Assessment:   Coronary artery disease: s/p CABGx1 to RCA 3/2023 following coronary perforation caused by PCI leading to hemopericardium and emergent cardiothoracic surgery.  Echo stress test 4/2024 with dyspnea showed no evidence of ischemia, preserved resting LVEF 55-60% without WMA, Rare PVCs.  Continues aspirin 162 mg.  Intermittent, nonexertional short lasting chest discomfort is atypical for angina.  She will continue to monitor this after recent nonischemic elevation.  Dyslipidemia: Atorvastatin 80 mg  Rheumatoid arthritis: ALT/AST within normal limits  Hypotensive: Initially 78/62, after a glass of water repeat 88/60.  She is asymptomatic      Plan:   Continue aspirin 162 mg and atorvastatin 80 mg daily  Will have lipid panel with PCP. Goal LDL <70.  Patient drink water during visit, she showed no signs of symptomatic hypotension.  We discussed eating breakfast and drinking a lot of water today when she returns home.  She is agreeable.  If she becomes presyncopal/lightheaded throughout the day we discussed reasons to present to emergency room.      Follow up in 1 year or sooner as needed      History of Present Illness/Subjective    HPI: Antonia Everett is a 74 year old female with PMHx of PAD, HLD, RA presents for follow-up.    Patient reports doing well overall for the last 6 months.  She had echocardiogram stress test performed 4/2024 with complaint of dyspnea with exertion which was unremarkable and without ischemia.  She continues to exercise daily with weight training, walking miles a day.  She denies any chest discomfort or significant dyspnea during these activities.  She does  "sweat \"excessively \"with exercise but feels fine while doing it.  She is noticed in the last few months some maximum 30 seconds long travel left-sided chest discomfort, maybe once daily, not associated with exertion.  Is not painful.        She denies lightheadedness, orthopnea, PND, palpitations, and lower extremity edema.      Echocardiogram stress 4/2024 Results:  Interpretation Summary  1. Normal stress echocardiogram without evidence of stress induced ischemia.  2. Normal resting LV systolic performance with an ejection fraction of 55-60%.  There is normal improvement in left ventricular systolic performance with a  peak ejection fraction of 70-75%.  3. No ECG evidence of ischemia.  4. No anginal chest pain reported with exercise.  5. Good functional capacity for age.     Stress Summary: Patient exercised on the Roderick protocol for a total of 9: 30  minutes. Peak heart rate achieved was 155 b.p.m (105% max.) with a double-  product of 20,720. The patient stopped exercise due to generalized fatigue. No  chest pain symptoms were reported.     Electrocardiogram: Baseline ECG reveals normal sinus rhythm without evidence  of prior myocardial injury. With exercise, there are no significant ECG  changes to suggest ischemia. Rare PVCs versus PACs with aberrancy were noted.     Baseline echocardiogram: Technically adequate images were obtained in the  standard quad-screen format. LV systolic performance is normal with a visually  estimated ejection fraction of 55-60%. There is normal regional wall motion.  No significant valvular heart disease is identified on limited screening  Doppler.     Postexercise echocardiogram: Technically adequate images were obtained  immediately post exercise in the standard quad-screen format. LV systolic  performance normally improves with a peak ejection fraction of 70-75%. There  are no stress induced regional wall motion abnormalities.     Physical Examination  Review of Systems " "  Vitals: BP (!) 88/60   Pulse 84   Resp 16   Ht 1.727 m (5' 8\")   Wt 70.2 kg (154 lb 12.8 oz)   LMP  (LMP Unknown)   BMI 23.54 kg/m    BMI= Body mass index is 23.54 kg/m .  Wt Readings from Last 3 Encounters:   09/17/24 70.2 kg (154 lb 12.8 oz)   03/15/24 77.5 kg (170 lb 14.4 oz)   10/19/23 75.8 kg (167 lb)           ENT/Mouth: membranes moist, no oral lesions or bleeding gums.      EYES:  no scleral icterus, normal conjunctivae       Chest/Lungs:   lungs are clear to auscultation, no rales or wheezing,  equal chest wall expansion    Cardiovascular:   Regular. Normal first and second heart sounds with no murmurs, rubs, or gallops; the carotid, radial pulses are intact, absent edema bilaterally        Extremities: no cyanosis or clubbing   Skin: no xanthelasma, warm.    Neurologic:  no tremors     Psychiatric: alert and oriented x3, calm        Please refer above for cardiac ROS details.        Medical History  Surgical History Family History Social History   Past Medical History:   Diagnosis Date     Bleeding duodenal ulcer 2010     Chronic diarrhea 04/20/2017    Colonoscopy normal except inflammatory polyp or     Chronic left shoulder pain 9/26/2017     Chronic low back pain without sciatica 11/3/2017     Chronic pain      Chronic radicular low back pain     Pain clinic.  Nerve stimulator placement     Complete tear of left rotator cuff 4/3/2018     Duodenal ulcer     bleeding     Family history of breast cancer      GERD (gastroesophageal reflux disease)      H/O ulcer disease     secondary to medications     Herpes zoster 2010     History of transfusion      Hypercholesterolemia      LA (lupus anticoagulant) disorder (H24)      Left hip pain 2015    bursitis, evaluated by orthopedics     Lichen sclerosus of female genitalia 6/16/2020     MRSA (methicillin resistant staph aureus) culture positive     betweeen 1996 and 2000     Oral lichen planus 4/20/2017     Plantar fasciitis      Raynaud's disease without " gangrene 11/20/2018    Involving both feet, describing burning sensation, redness and coolness to touch     Rheumatoid arthritis (H)     Followed by rheumatology, Dr. Felipe,      Screening     DEXA normal T score -0.9 2016, minimal change compared to previous DEXA     VRE (vancomycin resistant enterococcus) culture positive     between 1996 and 2000     Past Surgical History:   Procedure Laterality Date     ABDOMINAL HERNIA REPAIR       APPENDECTOMY       ARTHROSCOPY SHOULDER ROTATOR CUFF REPAIR Right      ARTHROSCOPY SHOULDER ROTATOR CUFF REPAIR Left 04/2018     BACK SURGERY       BIOPSY BREAST Left      BYPASS GRAFT ARTERY CORONARY N/A 3/23/2023    Procedure: CORONARY ARTERY BYPASS GRAFT X1 (CABG), EPIAORTIC ULTRASOUND, ENDOVEIN HARVEST FROM LEFT LOWER EXTREMITY;  Surgeon: Yessenia Zamorano MD;  [facility] [facility]     COLONOSCOPY      Normal colonoscopy May 2017 except inflammatory polyp     CV CORONARY ANGIOGRAM N/A 3/23/2023    Procedure: Coronary Angiogram;  Surgeon: Marcella Powers MD;  Location: [facility]     CV CORONARY LITHOTRIPSY PCI N/A 3/23/2023    Procedure: Percutaneous Coronary Intervention - Lithotripsy;  Surgeon: Marcella Powers MD;  Location: [facility]     CV INTRA AORTIC BALLOON N/A 3/23/2023    Procedure: Intraprocedure Aortic Balloon Pump Insertion;  Surgeon: Marcella Powers MD;  Location: [facility]     CV LEFT HEART CATH N/A 3/23/2023    Procedure: Left Heart Catheterization;  Surgeon: Marcella Powers MD;  Location: [facility]     CV PCI STENT DRUG ELUTING N/A 3/23/2023    Procedure: Percutaneous Coronary Intervention - Stent;  Surgeon: Marcella Powers MD;  Location: [facility]     CV PERICARDIOCENTESIS N/A 3/23/2023    Procedure: Pericardiocentesis;  Surgeon: Marcella Powers MD;  Location: [facility] CV     ESOPHAGOSCOPY, GASTROSCOPY, DUODENOSCOPY (EGD), COMBINED N/A 12/16/2018    Procedure:  ESOPHAGOGASTRODUODENOSCOPY (EGD) with biopsy and foreign body removal;  Surgeon: Raymond Triana MD;  Location: [facility];  Service: Gastroenterology     HC REMOVAL OF TONSILS,<11 Y/O      Description: Tonsillectomy;  Recorded: 2011;     HYSTERECTOMY      LUIS ANGEL with BSO     HYSTERECTOMY TOTAL ABDOMINAL, BILATERAL SALPINGO-OOPHORECTOMY, COMBINED       KNEE SURGERY Right 2007    Arthroscopic knee surgery     OPEN REDUCTION INTERNAL FIXATION ELBOW  2014     DE ARTHRODESIS ANT INTERBODY MIN DISCECTOMY,LUMBAR      Description: Lumbar Vertebral Fusion;  Recorded: 2011;  Comments: L5-S1 fusion     DE STEREOTACT STIM SPINAL CORD      Description: Spinal Stereotaxis Stimulation Of Cord;  Recorded: 2011;  Comments: implanted 2007 with lead revision      SKIN GRAFT       Family History   Problem Relation Age of Onset     Breast Cancer Mother 74.00     Cerebrovascular Disease Mother      Lung Cancer Father 70.00     Breast Cancer Maternal Aunt 70.00     Liver Cancer Brother 48.00        Social History     Socioeconomic History     Marital status:      Spouse name: Not on file     Number of children: Not on file     Years of education: Not on file     Highest education level: Not on file   Occupational History     Not on file   Tobacco Use     Smoking status: Former     Current packs/day: 0.00     Types: Cigarettes     Quit date: 1970     Years since quittin.3     Smokeless tobacco: Never   Vaping Use     Vaping status: Never Used   Substance and Sexual Activity     Alcohol use: No     Drug use: No     Sexual activity: Not on file   Other Topics Concern     Not on file   Social History Narrative    Lives at home with her  , gardening , bikes , swims 4-5 miles walk a day        Social Determinants of Health     Financial Resource Strain: Low Risk  (10/12/2023)    Financial Resource Strain      Within the past 12 months, have you or your family members you live with  been unable to get utilities (heat, electricity) when it was really needed?: No   Food Insecurity: Low Risk  (10/12/2023)    Food Insecurity      Within the past 12 months, did you worry that your food would run out before you got money to buy more?: No      Within the past 12 months, did the food you bought just not last and you didn t have money to get more?: No   Transportation Needs: Low Risk  (10/12/2023)    Transportation Needs      Within the past 12 months, has lack of transportation kept you from medical appointments, getting your medicines, non-medical meetings or appointments, work, or from getting things that you need?: No   Physical Activity: Not on file   Stress: Not on file   Social Connections: Not on file   Interpersonal Safety: Low Risk  (10/19/2023)    Interpersonal Safety      Do you feel physically and emotionally safe where you currently live?: Yes      Within the past 12 months, have you been hit, slapped, kicked or otherwise physically hurt by someone?: No      Within the past 12 months, have you been humiliated or emotionally abused in other ways by your partner or ex-partner?: No   Housing Stability: Low Risk  (10/12/2023)    Housing Stability      Do you have housing? : Yes      Are you worried about losing your housing?: No           Medications  Allergies   Current Outpatient Medications   Medication Sig Dispense Refill     acetaminophen (TYLENOL) 325 MG tablet Take 3 tablets (975 mg) by mouth every 8 hours as needed for mild pain 60 tablet 0     aspirin (ASA) 81 MG chewable tablet Take 2 tablets (162 mg) by mouth daily Starting tomorrow.       atorvastatin (LIPITOR) 80 MG tablet TAKE ONE TABLET BY MOUTH ONE TIME DAILY 90 tablet 0     chlorhexidine (PERIDEX) 0.12 % solution Take 15 mLs by mouth as needed.       diclofenac sodium 3 % Gel [DICLOFENAC SODIUM 3 % GEL] 4 g knees three times aday  and 2 g on hands or lebows three times a day 3 Tube 3     estradiol (ESTRACE) 0.1 MG/GM vaginal  cream PLACE 0.5 G VAGINALLY ONCE DAILY 42.5 g 0     GOLIMUMAB (SIMPONI ARIA IV) [GOLIMUMAB (SIMPONI ARIA IV)] Infuse into a venous catheter every 2 (two) months.        hydroxychloroquine (PLAQUENIL) 200 mg tablet [HYDROXYCHLOROQUINE (PLAQUENIL) 200 MG TABLET] Take 400 mg by mouth daily.        Lactobacillus rhamnosus GG (CULTURELLE) 10-15 Billion cell capsule [LACTOBACILLUS RHAMNOSUS GG (CULTURELLE) 10-15 BILLION CELL CAPSULE] Take 1 capsule by mouth daily.       multivitamin therapeutic (THERAGRAN) tablet [MULTIVITAMIN THERAPEUTIC (THERAGRAN) TABLET] Take 1 tablet by mouth daily.       nitroGLYcerin (NITROSTAT) 0.4 MG sublingual tablet For chest pain place 1 tablet under the tongue every 5 minutes for 3 doses. If symptoms persist 5 minutes after 1st dose call 911. 30 tablet 1     omeprazole (PRILOSEC) 40 MG DR capsule Take 1 capsule (40 mg) by mouth daily 90 capsule 3     sulfaSALAzine ER (AZULFIDINE EN) 500 MG EC tablet Take 1,000 mg by mouth 2 times daily         Allergies   Allergen Reactions     Codeine Unknown     nausea     Gabapentin Unknown     Fatigue       Pregabalin Unknown     Fatigue            Lab Results    Chemistry/lipid CBC Cardiac Enzymes/BNP/TSH/INR   Recent Labs   Lab Test 10/19/23  0928   CHOL 170   HDL 82   LDL 69   TRIG 95     Recent Labs   Lab Test 10/19/23  0928 02/16/23  1229 09/13/22  1028   LDL 69 108* 158*     Recent Labs   Lab Test 10/12/23  0859 04/04/23  1020   NA  --  139   POTASSIUM  --  4.8   CHLORIDE  --  108*   CO2  --  23   GLC  --  95   BUN  --  22   CR 0.94 1.07   GFRESTIMATED 64 55*   LULU  --  9.3     Recent Labs   Lab Test 10/12/23  0859 04/04/23  1020 03/27/23  0436   CR 0.94 1.07 0.83     Recent Labs   Lab Test 10/19/23  0928 02/16/23  1229 09/13/22  1028   A1C 6.0* 5.8* 5.8*          Recent Labs   Lab Test 10/12/23  0859   WBC 3.1*   HGB 9.9*   HCT 33.4*   MCV 84        Recent Labs   Lab Test 10/12/23  0859 04/04/23  1020 03/27/23  0436   HGB 9.9* 9.4* 7.7*     "No results for input(s): \"TROPONINI\" in the last 63651 hours.  No results for input(s): \"BNP\", \"NTBNPI\", \"NTBNP\" in the last 45835 hours.  Recent Labs   Lab Test 10/19/23  0928   TSH 1.92     Recent Labs   Lab Test 03/23/23  1655 03/23/23  1528 03/23/23  1306   INR 0.91 1.64* 1.35*          This note has been dictated using voice recognition software. Any grammatical, typographical, or context distortions are unintentional and inherent to the software    Tila Montoya PA-C                                         Thank you for allowing me to participate in the care of your patient.      Sincerely,     Tila Trevino PA-C     [facility]  cc:   Tila Montoya PA-C  [address]  [address]  [place] [address]      "

## 2024-10-25 ENCOUNTER — OFFICE VISIT (OUTPATIENT)
Dept: INTERNAL MEDICINE | Facility: CLINIC | Age: 74
End: 2024-10-25
Attending: INTERNAL MEDICINE
Payer: MEDICARE

## 2024-10-25 VITALS
DIASTOLIC BLOOD PRESSURE: 69 MMHG | OXYGEN SATURATION: 97 % | HEART RATE: 80 BPM | HEIGHT: 68 IN | SYSTOLIC BLOOD PRESSURE: 95 MMHG | WEIGHT: 172.4 LBS | RESPIRATION RATE: 15 BRPM | TEMPERATURE: 98.1 F | BODY MASS INDEX: 26.13 KG/M2

## 2024-10-25 DIAGNOSIS — Z98.61 PERCUTANEOUS TRANSLUMINAL CORONARY ANGIOPLASTY STATUS: ICD-10-CM

## 2024-10-25 DIAGNOSIS — I21.9: Primary | ICD-10-CM

## 2024-10-25 DIAGNOSIS — M85.80 OSTEOPENIA, UNSPECIFIED LOCATION: ICD-10-CM

## 2024-10-25 DIAGNOSIS — R93.89 ABNORMAL FINDINGS ON DIAGNOSTIC IMAGING OF OTHER SPECIFIED BODY STRUCTURES: ICD-10-CM

## 2024-10-25 DIAGNOSIS — R79.89 ELEVATED LIVER FUNCTION TESTS: ICD-10-CM

## 2024-10-25 DIAGNOSIS — E55.9 VITAMIN D DEFICIENCY: ICD-10-CM

## 2024-10-25 DIAGNOSIS — L43.8 ORAL LICHEN PLANUS: ICD-10-CM

## 2024-10-25 DIAGNOSIS — M81.0 AGE-RELATED OSTEOPOROSIS WITHOUT CURRENT PATHOLOGICAL FRACTURE: ICD-10-CM

## 2024-10-25 DIAGNOSIS — R94.39 ABNORMAL STRESS TEST: ICD-10-CM

## 2024-10-25 DIAGNOSIS — M54.16 LUMBAR RADICULOPATHY: ICD-10-CM

## 2024-10-25 DIAGNOSIS — D64.9 ANEMIA, UNSPECIFIED TYPE: ICD-10-CM

## 2024-10-25 DIAGNOSIS — Z96.89 SPINAL CORD STIMULATOR STATUS: ICD-10-CM

## 2024-10-25 DIAGNOSIS — Z00.00 HEALTHCARE MAINTENANCE: ICD-10-CM

## 2024-10-25 DIAGNOSIS — N90.4 LICHEN SCLEROSUS OF FEMALE GENITALIA: ICD-10-CM

## 2024-10-25 DIAGNOSIS — Z95.1 S/P CABG (CORONARY ARTERY BYPASS GRAFT): ICD-10-CM

## 2024-10-25 DIAGNOSIS — N95.2 VAGINAL ATROPHY: ICD-10-CM

## 2024-10-25 DIAGNOSIS — M06.9 RHEUMATOID ARTHRITIS, INVOLVING UNSPECIFIED SITE, UNSPECIFIED WHETHER RHEUMATOID FACTOR PRESENT (H): ICD-10-CM

## 2024-10-25 DIAGNOSIS — K21.9 GASTROESOPHAGEAL REFLUX DISEASE WITHOUT ESOPHAGITIS: ICD-10-CM

## 2024-10-25 DIAGNOSIS — R07.89 ATYPICAL CHEST PAIN: ICD-10-CM

## 2024-10-25 DIAGNOSIS — E78.00 HYPERCHOLESTEROLEMIA: ICD-10-CM

## 2024-10-25 PROBLEM — M54.50 CHRONIC LOW BACK PAIN WITHOUT SCIATICA: Status: RESOLVED | Noted: 2017-11-03 | Resolved: 2024-10-25

## 2024-10-25 PROBLEM — G89.29 CHRONIC LOW BACK PAIN WITHOUT SCIATICA: Status: RESOLVED | Noted: 2017-11-03 | Resolved: 2024-10-25

## 2024-10-25 PROBLEM — M75.122 COMPLETE TEAR OF LEFT ROTATOR CUFF: Status: RESOLVED | Noted: 2018-04-03 | Resolved: 2024-10-25

## 2024-10-25 LAB
ALBUMIN SERPL BCG-MCNC: 4.3 G/DL (ref 3.5–5.2)
ALP SERPL-CCNC: 48 U/L (ref 40–150)
ALT SERPL W P-5'-P-CCNC: 21 U/L (ref 0–50)
ANION GAP SERPL CALCULATED.3IONS-SCNC: 10 MMOL/L (ref 7–15)
AST SERPL W P-5'-P-CCNC: 41 U/L (ref 0–45)
BASOPHILS # BLD AUTO: 0 10E3/UL (ref 0–0.2)
BASOPHILS NFR BLD AUTO: 1 %
BILIRUB SERPL-MCNC: 0.3 MG/DL
BUN SERPL-MCNC: 23.4 MG/DL (ref 8–23)
CALCIUM SERPL-MCNC: 9.4 MG/DL (ref 8.8–10.4)
CHLORIDE SERPL-SCNC: 107 MMOL/L (ref 98–107)
CHOLEST SERPL-MCNC: 163 MG/DL
CREAT SERPL-MCNC: 0.86 MG/DL (ref 0.51–0.95)
EGFRCR SERPLBLD CKD-EPI 2021: 71 ML/MIN/1.73M2
EOSINOPHIL # BLD AUTO: 0.1 10E3/UL (ref 0–0.7)
EOSINOPHIL NFR BLD AUTO: 2 %
ERYTHROCYTE [DISTWIDTH] IN BLOOD BY AUTOMATED COUNT: 13.4 % (ref 10–15)
FASTING STATUS PATIENT QL REPORTED: ABNORMAL
FASTING STATUS PATIENT QL REPORTED: NORMAL
GLUCOSE SERPL-MCNC: 101 MG/DL (ref 70–99)
HCO3 SERPL-SCNC: 22 MMOL/L (ref 22–29)
HCT VFR BLD AUTO: 41.9 % (ref 35–47)
HDLC SERPL-MCNC: 106 MG/DL
HGB BLD-MCNC: 13.6 G/DL (ref 11.7–15.7)
IMM GRANULOCYTES # BLD: 0 10E3/UL
IMM GRANULOCYTES NFR BLD: 0 %
LDLC SERPL CALC-MCNC: 43 MG/DL
LYMPHOCYTES # BLD AUTO: 1.5 10E3/UL (ref 0.8–5.3)
LYMPHOCYTES NFR BLD AUTO: 38 %
MCH RBC QN AUTO: 31.9 PG (ref 26.5–33)
MCHC RBC AUTO-ENTMCNC: 32.5 G/DL (ref 31.5–36.5)
MCV RBC AUTO: 98 FL (ref 78–100)
MONOCYTES # BLD AUTO: 0.4 10E3/UL (ref 0–1.3)
MONOCYTES NFR BLD AUTO: 11 %
NEUTROPHILS # BLD AUTO: 1.8 10E3/UL (ref 1.6–8.3)
NEUTROPHILS NFR BLD AUTO: 48 %
NONHDLC SERPL-MCNC: 57 MG/DL
PLATELET # BLD AUTO: 163 10E3/UL (ref 150–450)
POTASSIUM SERPL-SCNC: 4.5 MMOL/L (ref 3.4–5.3)
PROT SERPL-MCNC: 6.9 G/DL (ref 6.4–8.3)
RBC # BLD AUTO: 4.26 10E6/UL (ref 3.8–5.2)
SODIUM SERPL-SCNC: 139 MMOL/L (ref 135–145)
TRIGL SERPL-MCNC: 71 MG/DL
TSH SERPL DL<=0.005 MIU/L-ACNC: 0.82 UIU/ML (ref 0.3–4.2)
VIT D+METAB SERPL-MCNC: 47 NG/ML (ref 20–50)
WBC # BLD AUTO: 3.8 10E3/UL (ref 4–11)

## 2024-10-25 PROCEDURE — 84443 ASSAY THYROID STIM HORMONE: CPT | Performed by: INTERNAL MEDICINE

## 2024-10-25 PROCEDURE — 82306 VITAMIN D 25 HYDROXY: CPT | Performed by: INTERNAL MEDICINE

## 2024-10-25 PROCEDURE — 84238 ASSAY NONENDOCRINE RECEPTOR: CPT | Mod: 90 | Performed by: INTERNAL MEDICINE

## 2024-10-25 PROCEDURE — 86364 TISS TRNSGLTMNASE EA IG CLAS: CPT | Performed by: INTERNAL MEDICINE

## 2024-10-25 PROCEDURE — 83540 ASSAY OF IRON: CPT | Performed by: INTERNAL MEDICINE

## 2024-10-25 PROCEDURE — 83550 IRON BINDING TEST: CPT | Performed by: INTERNAL MEDICINE

## 2024-10-25 PROCEDURE — 36415 COLL VENOUS BLD VENIPUNCTURE: CPT | Performed by: INTERNAL MEDICINE

## 2024-10-25 PROCEDURE — 99000 SPECIMEN HANDLING OFFICE-LAB: CPT | Performed by: INTERNAL MEDICINE

## 2024-10-25 PROCEDURE — 85025 COMPLETE CBC W/AUTO DIFF WBC: CPT | Performed by: INTERNAL MEDICINE

## 2024-10-25 PROCEDURE — 80061 LIPID PANEL: CPT | Performed by: INTERNAL MEDICINE

## 2024-10-25 PROCEDURE — 99214 OFFICE O/P EST MOD 30 MIN: CPT | Mod: 25 | Performed by: INTERNAL MEDICINE

## 2024-10-25 PROCEDURE — G0439 PPPS, SUBSEQ VISIT: HCPCS | Performed by: INTERNAL MEDICINE

## 2024-10-25 PROCEDURE — 80053 COMPREHEN METABOLIC PANEL: CPT | Performed by: INTERNAL MEDICINE

## 2024-10-25 RX ORDER — OMEPRAZOLE 40 MG/1
40 CAPSULE, DELAYED RELEASE ORAL DAILY
Qty: 90 CAPSULE | Refills: 3 | Status: SHIPPED | OUTPATIENT
Start: 2024-10-25

## 2024-10-25 RX ORDER — NITROGLYCERIN 0.4 MG/1
TABLET SUBLINGUAL
Qty: 30 TABLET | Refills: 1 | Status: SHIPPED | OUTPATIENT
Start: 2024-10-25

## 2024-10-25 RX ORDER — ESTRADIOL 0.1 MG/G
CREAM VAGINAL
Qty: 42.5 G | Refills: 3 | Status: SHIPPED | OUTPATIENT
Start: 2024-10-28

## 2024-10-25 RX ORDER — ATORVASTATIN CALCIUM 80 MG/1
80 TABLET, FILM COATED ORAL DAILY
Qty: 90 TABLET | Refills: 0 | Status: SHIPPED | OUTPATIENT
Start: 2024-10-25

## 2024-10-25 RX ORDER — TRIAMCINOLONE ACETONIDE 0.1 %
PASTE (GRAM) DENTAL 2 TIMES DAILY
Qty: 5 G | Refills: 2 | Status: SHIPPED | OUTPATIENT
Start: 2024-10-25

## 2024-10-25 SDOH — HEALTH STABILITY: PHYSICAL HEALTH: ON AVERAGE, HOW MANY DAYS PER WEEK DO YOU ENGAGE IN MODERATE TO STRENUOUS EXERCISE (LIKE A BRISK WALK)?: 6 DAYS

## 2024-10-25 SDOH — HEALTH STABILITY: PHYSICAL HEALTH: ON AVERAGE, HOW MANY MINUTES DO YOU ENGAGE IN EXERCISE AT THIS LEVEL?: 60 MIN

## 2024-10-25 ASSESSMENT — SOCIAL DETERMINANTS OF HEALTH (SDOH): HOW OFTEN DO YOU GET TOGETHER WITH FRIENDS OR RELATIVES?: MORE THAN THREE TIMES A WEEK

## 2024-10-25 ASSESSMENT — PAIN SCALES - GENERAL: PAINLEVEL_OUTOF10: MILD PAIN (2)

## 2024-10-25 NOTE — PROGRESS NOTES
Preventive Care Visit  [facility]  Sarah Beth Carvalho MD, Internal Medicine  Oct 25, 2024      Assessment & Plan       S/P CABG (coronary artery bypass graft)  No angina , is doing well is on aspirin and atorvastatin.  - atorvastatin (LIPITOR) 80 MG tablet; Take 1 tablet (80 mg) by mouth daily.    Vaginal atrophy  Stable no recurrent UTIS   - estradiol (ESTRACE) 0.1 MG/GM vaginal cream; Place vaginally twice a week.    Ruptured, coronary artery (H)  Remote history resolved  - Lipid panel reflex to direct LDL Non-fasting; Future  - Lipid panel reflex to direct LDL Non-fasting    Lumbar radiculopathy  Has spinal stimulator in place . Remote history of spinal fusion   Currently pain free   Gastroesophageal reflux disease without esophagitis  Using PPIS ,     Oral lichen planus  Does have some oral gels but they do not stick we will try dental adhesive paste did send that in to [facility]    Hypercholesterolemia  On high-dose statin.  LDL is perfect    Rheumatoid arthritis, involving unspecified site, unspecified whether rheumatoid factor present (H)  On biologic Simponi and she is on sulfasalazine and Plaquenil.  [facility]    Lichen sclerosus of female genitalia      Percutaneous transluminal coronary angioplasty status      Healthcare maintenance  Colonoscopy is due 2027 bone density is due.  Mammogram she is doing annually.  Immunization is reviewed and up-to-date    Osteopenia, unspecified location  He is doing weights and is on calcium and vitamin D  - DX Bone Density; Future    Vitamin D deficiency    - Vitamin D Deficiency; Future  - CBC with platelets and differential; Future  - Soluble transferrin receptor; Future  - Iron and iron binding capacity; Future  - Tissue transglutaminase cong IgA and IgG; Future  - Vitamin D Deficiency  - CBC with platelets and differential  - Soluble transferrin receptor  - Iron and iron binding capacity  - Tissue transglutaminase cong IgA  "and IgG    Anemia, unspecified type  Last year she did have anemia but subsequent blood counts at the rheumatology office have been normal.  The only change she made to start iron.  If she is now off of iron if she is still anemic that is a bit of a concern because it means she may have GI blood loss.  - Iron and iron binding capacity; Future  - Tissue transglutaminase cong IgA and IgG; Future  - Iron and iron binding capacity  - Tissue transglutaminase cong IgA and IgG    Abnormal findings on diagnostic imaging of other specified body structures    - TSH; Future  - TSH    Age-related osteoporosis without current pathological fracture    - DX Bone Density; Future    Spinal cord stimulator status  Currently inactive.    Elevated liver function tests  Mild elevation in liver tests we will get a baseline ultrasound.  Overall doing really well no concerns  - US Abdomen Limited; Future            BMI  Estimated body mass index is 26.22 kg/m  as calculated from the following:    Height as of this encounter: 1.727 m (5' 7.99\").    Weight as of this encounter: 78.2 kg (172 lb 6.4 oz).       Counseling  Appropriate preventive services were addressed with this patient via screening, questionnaire, or discussion as appropriate for fall prevention, nutrition, physical activity, Tobacco-use cessation, social engagement, weight loss and cognition.  Checklist reviewing preventive services available has been given to the patient.  Reviewed patient's diet, addressing concerns and/or questions.   She is at risk for psychosocial distress and has been provided with information to reduce risk.   The patient was provided with written information regarding signs of hearing loss.           Sarai James is a 74 year old, presenting for the following:  Wellness Visit (No questions at the time of rooming. )  Excedrin headache every other month plus two robert aspirins         10/25/2024     8:38 AM   Additional Questions   Roomed by Oralia"           HPI        In dermatologist for Mohs surgery.  Health Care Directive  Patient does not have a Health Care Directive: Patient states has Advance Directive and will bring in a copy to clinic.      10/25/2024   General Health   How would you rate your overall physical health? Good   Feel stress (tense, anxious, or unable to sleep) Only a little      (!) STRESS CONCERN      10/25/2024   Nutrition   Diet: Regular (no restrictions)            10/25/2024   Exercise   Days per week of moderate/strenous exercise 6 days   Average minutes spent exercising at this level 60 min            10/25/2024   Social Factors   Frequency of gathering with friends or relatives More than three times a week   Worry food won't last until get money to buy more No   Food not last or not have enough money for food? No   Do you have housing? (Housing is defined as stable permanent housing and does not include staying ouside in a car, in a tent, in an abandoned building, in an overnight shelter, or couch-surfing.) Yes   Are you worried about losing your housing? No   Lack of transportation? No   Unable to get utilities (heat,electricity)? No            10/25/2024   Fall Risk   Fallen 2 or more times in the past year? No    Trouble with walking or balance? No        Patient-reported          10/25/2024   Activities of Daily Living- Home Safety   Needs help with the following daily activites None of the above   Safety concerns in the home None of the above            10/25/2024   Dental   Dentist two times every year? Yes            10/25/2024   Hearing Screening   Hearing concerns? (!) IT'S HARD TO FOLLOW A CONVERSATION IN A NOISY RESTAURANT OR CROWDED ROOM.            10/25/2024   Driving Risk Screening   Patient/family members have concerns about driving No            10/25/2024   General Alertness/Fatigue Screening   Have you been more tired than usual lately? No            10/25/2024   Urinary Incontinence Screening   Bothered by  leaking urine in past 6 months No            10/25/2024   TB Screening   Were you born outside of the [place]? No            Today's PHQ-2 Score:       10/25/2024     8:35 AM   PHQ-2 (  [employer])   Q1: Little interest or pleasure in doing things 0    Q2: Feeling down, depressed or hopeless 0    PHQ-2 Score 0    Q1: Little interest or pleasure in doing things Not at all   Q2: Feeling down, depressed or hopeless Not at all   PHQ-2 Score 0       Patient-reported           10/25/2024   Substance Use   Alcohol more than 3/day or more than 7/wk Not Applicable   Do you have a current opioid prescription? No   How severe/bad is pain from 1 to 10? 3/10   Do you use any other substances recreationally? No        Social History     Tobacco Use    Smoking status: Former     Current packs/day: 0.00     Types: Cigarettes     Quit date: 1970     Years since quittin.4    Smokeless tobacco: Never   Vaping Use    Vaping status: Never Used   Substance Use Topics    Alcohol use: No    Drug use: No           9/3/2024   LAST FHS-7 RESULTS   1st degree relative breast or ovarian cancer Yes   Any relative bilateral breast cancer No   Any male have breast cancer No   Any ONE woman have BOTH breast AND ovarian cancer No   Any woman with breast cancer before 50yrs No   2 or more relatives with breast AND/OR ovarian cancer No   2 or more relatives with breast AND/OR bowel cancer No               ASCVD Risk   The ASCVD Risk score (Cristal BLOCK, et al., 2019) failed to calculate for the following reasons:    Risk score cannot be calculated because patient has a medical history suggesting prior/existing ASCVD            Reviewed and updated as needed this visit by Provider                      Current providers sharing in care for this patient include:  Patient Care Team:  Sarah Beth Carvalho MD as PCP - General (Internal Medicine)  Sarah Beth Carvalho MD as Assigned PCP  Rolo Case MD as MD (Cardiovascular Disease)  Uriel  "Germán Vicente DO as Assigned Heart and Vascular Provider    The following health maintenance items are reviewed in Epic and correct as of today:  Health Maintenance   Topic Date Due    LIPID  10/19/2024    ANNUAL REVIEW OF HM ORDERS  10/19/2024    MEDICARE ANNUAL WELLNESS VISIT  10/19/2024    COVID-19 Vaccine (7 - 2024-25 season) 11/19/2024    FALL RISK ASSESSMENT  10/25/2025    GLUCOSE  04/04/2026    MAMMO SCREENING  09/03/2026    COLORECTAL CANCER SCREENING  12/31/2027    ADVANCE CARE PLANNING  10/22/2028    DTAP/TDAP/TD IMMUNIZATION (3 - Td or Tdap) 09/30/2030    DEXA  08/23/2036    HEPATITIS C SCREENING  Completed    PHQ-2 (once per calendar year)  Completed    INFLUENZA VACCINE  Completed    Pneumococcal Vaccine: 65+ Years  Completed    ZOSTER IMMUNIZATION  Completed    RSV VACCINE  Completed    HPV IMMUNIZATION  Aged Out    MENINGITIS IMMUNIZATION  Aged Out    RSV MONOCLONAL ANTIBODY  Aged Out            Objective    Exam  BP 95/69 (BP Location: Left arm, Patient Position: Sitting, Cuff Size: Adult Large)   Pulse 80   Temp 98.1  F (36.7  C) (Tympanic)   Resp 15   Ht 1.727 m (5' 7.99\")   Wt 78.2 kg (172 lb 6.4 oz)   LMP  (LMP Unknown)   SpO2 97%   BMI 26.22 kg/m     Estimated body mass index is 26.22 kg/m  as calculated from the following:    Height as of this encounter: 1.727 m (5' 7.99\").    Weight as of this encounter: 78.2 kg (172 lb 6.4 oz).    Physical Exam  GENERAL: alert and no distress  NECK: no adenopathy, no asymmetry, masses, or scars  RESP: lungs clear to auscultation - no rales, rhonchi or wheezes  CV: regular rate and rhythm, normal S1 S2, no S3 or S4, no murmur, click or rub, no peripheral edema  ABDOMEN: soft, nontender, no hepatosplenomegaly, no masses and bowel sounds normal  MS: no gross musculoskeletal defects noted, no edema   Right leg negative      10/25/2024   Mini Cog   Clock Draw Score 2 Normal   3 Item Recall 3 objects recalled   Mini Cog Total Score 5             "     Signed Electronically by: Sarah Beth Carvalho MD

## 2024-10-26 LAB
IRON BINDING CAPACITY (ROCHE): 307 UG/DL (ref 240–430)
IRON SATN MFR SERPL: 33 % (ref 15–46)
IRON SERPL-MCNC: 101 UG/DL (ref 37–145)

## 2024-10-27 LAB — STFR SERPL-MCNC: 4.2 MG/L

## 2024-10-28 ENCOUNTER — TRANSFERRED RECORDS (OUTPATIENT)
Dept: HEALTH INFORMATION MANAGEMENT | Facility: CLINIC | Age: 74
End: 2024-10-28
Payer: MEDICARE

## 2024-10-28 ENCOUNTER — PATIENT OUTREACH (OUTPATIENT)
Dept: CARE COORDINATION | Facility: CLINIC | Age: 74
End: 2024-10-28
Payer: MEDICARE

## 2024-10-28 LAB
TTG IGA SER-ACNC: 0.6 U/ML
TTG IGG SER-ACNC: <0.6 U/ML

## 2024-11-04 ENCOUNTER — TRANSFERRED RECORDS (OUTPATIENT)
Dept: HEALTH INFORMATION MANAGEMENT | Facility: CLINIC | Age: 74
End: 2024-11-04
Payer: MEDICARE

## 2025-01-18 DIAGNOSIS — Z95.1 S/P CABG (CORONARY ARTERY BYPASS GRAFT): ICD-10-CM

## 2025-01-20 RX ORDER — ATORVASTATIN CALCIUM 80 MG/1
80 TABLET, FILM COATED ORAL DAILY
Qty: 90 TABLET | Refills: 2 | Status: SHIPPED | OUTPATIENT
Start: 2025-01-20

## 2025-02-05 ENCOUNTER — TELEPHONE (OUTPATIENT)
Dept: INTERNAL MEDICINE | Facility: CLINIC | Age: 75
End: 2025-02-05
Payer: MEDICARE

## 2025-02-05 NOTE — TELEPHONE ENCOUNTER
Call return to patient to clarify her message. Patient last seen Dr. Carvalho on 10/25/24, she was unaware that patient that she needed to complete a Abd Ultrasound. When reviewing note it indicates below:    Elevated liver function tests  Mild elevation in liver tests we will get a baseline ultrasound.  Overall doing really well no concerns  - US Abdomen Limited; Future       Patient reports that she is currently in [place] and will return on 2/14/25, she has a lab appt/infusion appt arranged with Rheumatology if it's prefer for patient to recheck liver functions instead of ultrasound she can do this. Please advise on your recommendations at this time for patient, thank you.

## 2025-02-05 NOTE — TELEPHONE ENCOUNTER
General Call      Reason for Call:  abdominal ultrasound    What are your questions or concerns:  patient states she has an order for this and she thinks it is a mistake?    Date of last appointment with provider: 10-25-24    Could we send this information to you in [facility] [place] or would you prefer to receive a phone call?:   Patient would prefer a phone call   Okay to leave a detailed message?: Yes at [phone]

## 2025-02-17 ENCOUNTER — TRANSFERRED RECORDS (OUTPATIENT)
Dept: HEALTH INFORMATION MANAGEMENT | Facility: CLINIC | Age: 75
End: 2025-02-17
Payer: MEDICARE

## 2025-02-17 DIAGNOSIS — Z79.899 LONG TERM USE OF DRUG: Primary | ICD-10-CM

## 2025-02-21 ENCOUNTER — ANCILLARY PROCEDURE (OUTPATIENT)
Dept: BONE DENSITY | Facility: CLINIC | Age: 75
End: 2025-02-21
Attending: INTERNAL MEDICINE
Payer: MEDICARE

## 2025-02-21 DIAGNOSIS — M81.0 AGE-RELATED OSTEOPOROSIS WITHOUT CURRENT PATHOLOGICAL FRACTURE: ICD-10-CM

## 2025-02-21 DIAGNOSIS — M85.80 OSTEOPENIA, UNSPECIFIED LOCATION: ICD-10-CM

## 2025-02-21 PROCEDURE — 77080 DXA BONE DENSITY AXIAL: CPT | Mod: TC | Performed by: RADIOLOGY

## 2025-02-21 PROCEDURE — 77081 DXA BONE DENSITY APPENDICULR: CPT | Mod: TC | Performed by: RADIOLOGY

## 2025-02-21 PROCEDURE — 77091 TBS TECHL CALCULATION ONLY: CPT | Performed by: RADIOLOGY

## 2025-03-22 ENCOUNTER — HEALTH MAINTENANCE LETTER (OUTPATIENT)
Age: 75
End: 2025-03-22

## 2025-04-28 ENCOUNTER — TRANSFERRED RECORDS (OUTPATIENT)
Dept: HEALTH INFORMATION MANAGEMENT | Facility: CLINIC | Age: 75
End: 2025-04-28
Payer: MEDICARE

## 2025-06-27 ENCOUNTER — TRANSFERRED RECORDS (OUTPATIENT)
Dept: HEALTH INFORMATION MANAGEMENT | Facility: CLINIC | Age: 75
End: 2025-06-27
Payer: MEDICARE

## 2025-08-27 ENCOUNTER — OFFICE VISIT (OUTPATIENT)
Dept: CARDIOLOGY | Facility: CLINIC | Age: 75
End: 2025-08-27
Payer: MEDICARE

## 2025-08-27 VITALS
OXYGEN SATURATION: 97 % | WEIGHT: 172 LBS | SYSTOLIC BLOOD PRESSURE: 105 MMHG | BODY MASS INDEX: 26.16 KG/M2 | HEART RATE: 83 BPM | DIASTOLIC BLOOD PRESSURE: 69 MMHG

## 2025-08-27 DIAGNOSIS — Z95.1 S/P CABG (CORONARY ARTERY BYPASS GRAFT): ICD-10-CM

## 2025-08-27 DIAGNOSIS — I25.10 CORONARY ARTERY DISEASE INVOLVING NATIVE CORONARY ARTERY OF NATIVE HEART WITHOUT ANGINA PECTORIS: ICD-10-CM

## 2025-08-27 DIAGNOSIS — I21.9: ICD-10-CM

## 2025-08-27 DIAGNOSIS — E78.00 HYPERCHOLESTEROLEMIA: ICD-10-CM

## 2025-08-27 PROCEDURE — G2211 COMPLEX E/M VISIT ADD ON: HCPCS | Performed by: INTERNAL MEDICINE

## 2025-08-27 PROCEDURE — 99214 OFFICE O/P EST MOD 30 MIN: CPT | Performed by: INTERNAL MEDICINE

## 2025-08-27 PROCEDURE — 3078F DIAST BP <80 MM HG: CPT | Performed by: INTERNAL MEDICINE

## 2025-08-27 PROCEDURE — 3074F SYST BP LT 130 MM HG: CPT | Performed by: INTERNAL MEDICINE

## 2025-08-27 RX ORDER — ATORVASTATIN CALCIUM 80 MG/1
80 TABLET, FILM COATED ORAL DAILY
Qty: 90 TABLET | Refills: 3 | Status: SHIPPED | OUTPATIENT
Start: 2025-08-27

## 2025-09-02 ENCOUNTER — TRANSFERRED RECORDS (OUTPATIENT)
Dept: HEALTH INFORMATION MANAGEMENT | Facility: CLINIC | Age: 75
End: 2025-09-02
Payer: MEDICARE

## (undated) DEVICE — CATH ANGIO INFINITI JL3.5 4FRX100CM 538418

## (undated) DEVICE — SYR ANGIOGRAPHY MULTIUSE KIT ACIST 014612

## (undated) DEVICE — SU PLEDGET SOFT TFE 3/8"X3/26"X1/16" PCP40

## (undated) DEVICE — SU PROLENE 4-0 SHDA 36" 8521H

## (undated) DEVICE — PREP CHLORAPREP 26ML TINTED HI-LITE ORANGE 930815

## (undated) DEVICE — KIT ENDO VASOVIEW HARVESTING SYSTEM VH-3200

## (undated) DEVICE — CABLE MYO/LEAD PACING WHITE DISP 019-530

## (undated) DEVICE — CELL SAVER

## (undated) DEVICE — GLOVE LINER COTTON MED 32-2076

## (undated) DEVICE — CATH RX TAKERU PTCA BALLOON 1.5X12MM DC-RY1512UA1

## (undated) DEVICE — SHEATH GLIDE RADIAL 4FR 25CM 0.021

## (undated) DEVICE — SUCTION CANISTER MEDIVAC LINER 3000ML W/LID 65651-530

## (undated) DEVICE — ADH SKIN CLOSURE PREMIERPRO EXOFIN 1.0ML 3470

## (undated) DEVICE — CUSTOM PACK CAB SCV5BCBHEA

## (undated) DEVICE — SHEATH GLIDE RADIAL 6FR 25CM .035

## (undated) DEVICE — SU PROLENE 7-0 BV175-6 24' M8737

## (undated) DEVICE — KIT HAND CONTROL ACIST 014644 AR-P54

## (undated) DEVICE — GUIDEWIRE STARTER .035INX150CM

## (undated) DEVICE — HEMOSTASIS BONE OSTENE 2.5G SYNTHETIC 1503832

## (undated) DEVICE — SU ETHIBOND 3-0 BBDA 36" X588H

## (undated) DEVICE — LIGACLIP LARGE AESCULAP O4120-1

## (undated) DEVICE — SLEEVE TR BAND RADIAL COMPRESSION DEVICE 24CM TRB24-REG

## (undated) DEVICE — GUIDEWIRE FORTE FLOPPY J TOP 34949-05J

## (undated) DEVICE — Device

## (undated) DEVICE — CATH BALLOON EMERGE 2.0X12MM H7493918912200

## (undated) DEVICE — CABLE MYO/LEAD PACING BLUE DISP 019-535

## (undated) DEVICE — SURGICEL POWDER ABSORBABLE HEMOSTAT 3GM 3013SP

## (undated) DEVICE — SOL NACL 0.9% IRRIG 1000ML BOTTLE 2F7124

## (undated) DEVICE — MANIFOLD KIT ANGIO AUTOMATED 014613

## (undated) DEVICE — LEAD PACER MYOCARDIAL BIPOLAR TEMPORARY 53CM 6495F

## (undated) DEVICE — GOWN IMPERVIOUS BREATHABLE SMART LG 89015

## (undated) DEVICE — EXCHANGE WIRE .035 260 STAR/JFC/035/260/ M001491681

## (undated) DEVICE — CLIP HORIZON MULTI SM YELLOW 001204

## (undated) DEVICE — CUSTOM PACK CORONARY SAN5BCRHEA

## (undated) DEVICE — TUBING INSUFFLATION W/FILTER 28-0207

## (undated) DEVICE — INTRO MICRO MINI STICK 4FR STD NITINOL

## (undated) DEVICE — SOL WATER IRRIG 1000ML BOTTLE 2F7114

## (undated) DEVICE — DEVICE INFLATION SYR W/ HEMOSTASIS VALVE 12IN EXT IN4904

## (undated) DEVICE — SET CANNULATION TOUNIQUET TS-10061

## (undated) DEVICE — CONNECTOR BLAKE DRAIN SGL

## (undated) DEVICE — SUCTION TIP YANKAUER W/O VENT K86

## (undated) DEVICE — PACK MAJOR BASIN 673

## (undated) DEVICE — PLATE GROUNDING ADULT W/CORD 9165L

## (undated) DEVICE — ELECTRODE ADULT PACING MULTI P-211-M1

## (undated) DEVICE — CLIP SPRING FOGARTY SOFTJAW CSOFT6

## (undated) DEVICE — INTRO SHEATH 4FRX10CM PINNACLE RSS402

## (undated) DEVICE — CATH THORACIC STRAIGHT CLOTSTOP 32FR

## (undated) DEVICE — CATH CORONARY LITHOTRIPSY SHOCKWAVE 3.5X12MM C2IVL3512

## (undated) DEVICE — CATH BALLOON IABP 7.5FRX50ML INTRA-AORTIC 0684-00-0576-01U

## (undated) DEVICE — ESU ELEC BLADE 2.75" COATED/INSULATED E1455

## (undated) DEVICE — PITCHER STERILE 1000ML  SSK9004A

## (undated) DEVICE — CATH GUIDING 6FR AL .75 LA6AL75

## (undated) DEVICE — RX VISTASEAL FIBRIN SEALANT W/THROMBIN 10ML VST10

## (undated) DEVICE — SUCTION DRY CHEST DRAIN OASIS 3600-100

## (undated) DEVICE — DRSG DRAIN 4X4" 7086

## (undated) DEVICE — CATH BALLOON EMERGE 3.0X12MM H7493918912300

## (undated) DEVICE — TRANSDUCER W/MONITORING TRAY 42632-05

## (undated) DEVICE — GLOVE GAMMEX NEOPRENE ULTRA SZ 7 LF 8514

## (undated) DEVICE — ESU ELEC BLADE E-SEP INSULATED NEPTUNE 70MM 0703-070-002

## (undated) DEVICE — CATH SUCTION 14FR W/O CTRL DYND41962

## (undated) DEVICE — ESU PENCIL SMOKE EVAC W/ROCKER SWITCH 0703-047-000

## (undated) DEVICE — RX SURGIFLO HEMOSTATIC MATRIX W/THROMBIN 8ML 2994

## (undated) DEVICE — BLANKET BAIR ADLT PLYMR 60X36IN 63000

## (undated) DEVICE — CATH RX TAKERU PTCA BALLOON 1.5X6MM DC-RY1506UA1

## (undated) DEVICE — CUSTOM PACK CV ST JOES SCV5BCVHEA

## (undated) DEVICE — CATH BALLOON EMERGE 3.5X12MM H7493918912350

## (undated) DEVICE — CATH DIAG 4FR JR 5.0 538423

## (undated) DEVICE — SUCTION SLEEVE NEPTUNE 2 125MM 0703-005-125

## (undated) DEVICE — CATH GUIDELINER 6FR 5571

## (undated) DEVICE — SU PROLENE 4-0 RB-1DA 36" 8557H

## (undated) RX ORDER — FENTANYL CITRATE 50 UG/ML
INJECTION, SOLUTION INTRAMUSCULAR; INTRAVENOUS
Status: DISPENSED
Start: 2023-03-23

## (undated) RX ORDER — KETAMINE HYDROCHLORIDE 10 MG/ML
INJECTION INTRAMUSCULAR; INTRAVENOUS
Status: DISPENSED
Start: 2023-03-23

## (undated) RX ORDER — EPINEPHRINE IN SOD CHLOR,ISO 1 MG/10 ML
SYRINGE (ML) INTRAVENOUS
Status: DISPENSED
Start: 2023-03-23

## (undated) RX ORDER — CEFAZOLIN SODIUM 1 G/3ML
INJECTION, POWDER, FOR SOLUTION INTRAMUSCULAR; INTRAVENOUS
Status: DISPENSED
Start: 2023-03-23

## (undated) RX ORDER — PROTAMINE SULFATE 10 MG/ML
INJECTION, SOLUTION INTRAVENOUS
Status: DISPENSED
Start: 2023-03-23

## (undated) RX ORDER — VANCOMYCIN HYDROCHLORIDE 1 G/20ML
INJECTION, POWDER, LYOPHILIZED, FOR SOLUTION INTRAVENOUS
Status: DISPENSED
Start: 2023-03-23

## (undated) RX ORDER — DIAZEPAM 5 MG
TABLET ORAL
Status: DISPENSED
Start: 2023-03-23

## (undated) RX ORDER — PHENYLEPHRINE HYDROCHLORIDE 10 MG/ML
INJECTION INTRAVENOUS
Status: DISPENSED
Start: 2023-03-23